# Patient Record
Sex: MALE | Race: OTHER | NOT HISPANIC OR LATINO | Employment: FULL TIME | URBAN - METROPOLITAN AREA
[De-identification: names, ages, dates, MRNs, and addresses within clinical notes are randomized per-mention and may not be internally consistent; named-entity substitution may affect disease eponyms.]

---

## 2023-05-16 DIAGNOSIS — I10 BENIGN ESSENTIAL HYPERTENSION: Primary | ICD-10-CM

## 2023-05-16 RX ORDER — PANTOPRAZOLE SODIUM 20 MG/1
TABLET, DELAYED RELEASE ORAL
COMMUNITY
Start: 2023-04-28 | End: 2023-11-28 | Stop reason: ALTCHOICE

## 2023-05-16 RX ORDER — LISINOPRIL 5 MG/1
5 TABLET ORAL DAILY
Qty: 90 TABLET | Refills: 2 | Status: SHIPPED
Start: 2023-05-16 | End: 2024-02-05

## 2023-05-16 RX ORDER — METOPROLOL SUCCINATE 100 MG/1
1 TABLET, EXTENDED RELEASE ORAL DAILY
COMMUNITY
Start: 2020-08-17 | End: 2023-07-11 | Stop reason: SINTOL

## 2023-05-16 RX ORDER — LOVASTATIN 10 MG/1
TABLET ORAL
COMMUNITY
Start: 2021-08-06 | End: 2023-07-12

## 2023-05-16 RX ORDER — SUCRALFATE 1 G/1
TABLET ORAL
COMMUNITY
Start: 2023-03-06 | End: 2023-11-28 | Stop reason: ALTCHOICE

## 2023-05-16 RX ORDER — ONDANSETRON HYDROCHLORIDE 8 MG/1
TABLET, FILM COATED ORAL
COMMUNITY
Start: 2023-03-16 | End: 2023-07-11 | Stop reason: ALTCHOICE

## 2023-05-16 RX ORDER — MINERAL OIL
1 ENEMA (ML) RECTAL DAILY
COMMUNITY
Start: 2021-02-12 | End: 2023-07-11 | Stop reason: ALTCHOICE

## 2023-05-16 RX ORDER — PREDNISONE 50 MG/1
TABLET ORAL
COMMUNITY
Start: 2023-01-01 | End: 2023-07-11 | Stop reason: ALTCHOICE

## 2023-05-16 RX ORDER — AMOXICILLIN AND CLAVULANATE POTASSIUM 875; 125 MG/1; MG/1
1 TABLET, FILM COATED ORAL 2 TIMES DAILY
Qty: 10 TABLET | Refills: 0 | COMMUNITY
Start: 2023-01-01 | End: 2023-01-06

## 2023-05-16 RX ORDER — LIDOCAINE HYDROCHLORIDE 20 MG/ML
SOLUTION ORAL; TOPICAL
COMMUNITY
Start: 2023-03-16 | End: 2023-07-11 | Stop reason: ALTCHOICE

## 2023-05-16 RX ORDER — CLOTRIMAZOLE AND BETAMETHASONE DIPROPIONATE 10; .5 MG/ML; MG/ML
LOTION TOPICAL 2 TIMES DAILY
COMMUNITY
Start: 2021-02-12 | End: 2023-07-11 | Stop reason: ALTCHOICE

## 2023-05-16 RX ORDER — ALBUTEROL SULFATE 90 UG/1
AEROSOL, METERED RESPIRATORY (INHALATION) 2 TIMES DAILY PRN
COMMUNITY
Start: 2018-10-17

## 2023-05-16 RX ORDER — PROCHLORPERAZINE MALEATE 10 MG
TABLET ORAL
COMMUNITY
Start: 2023-03-16 | End: 2023-11-28 | Stop reason: ALTCHOICE

## 2023-05-16 RX ORDER — OXYCODONE HYDROCHLORIDE 5 MG/1
CAPSULE ORAL
COMMUNITY
Start: 2023-03-23 | End: 2023-11-28 | Stop reason: ALTCHOICE

## 2023-05-16 RX ORDER — LISINOPRIL 5 MG/1
1 TABLET ORAL DAILY
COMMUNITY
End: 2023-07-11 | Stop reason: SDUPTHER

## 2023-07-08 NOTE — PROGRESS NOTES
Subjective   Patient ID: Torres Rivera is a 53 y.o. male with a history of stage IIIc NSCLC right lower lobe lung cancer, hypertension, dyslipidemia, BPH, osteoporosis, is who presents for Follow-up.    HPI The patient self stopped Metoprolol 7 months ago due to erectile dysfunction.  Stated that it had resolved after he discontinued it.  He is currently on lisinopril 5 mg and stated that his blood pressure is well controlled.  He did not take his lisinopril this morning since he is fasting for blood work.  The patient was started on prednisone 90 mg taper by oncology since he developed pneumonitis from durvalumab.  He is currently on the 50 mg and will taper until end of August.  Stated he gained a lot of weight while being on prednisone.  He is breathing much improved.  Denies shortness of breath.    The patient had blood work this morning at the oncology but stated they do not check his cholesterol level and is interested in checking his lipid panel.  He takes his lovastatin daily.  Today he denies chest pain, leg edema, heart palpitations, dizziness or headaches.  No fever or chills.  Denies nausea or vomiting.  No abdominal pain.    Review of Systems   Constitutional:  Negative for appetite change, chills, fatigue and fever.   HENT:  Negative for congestion, ear pain, facial swelling, hearing loss, nosebleeds and sore throat.    Eyes:  Negative for pain, discharge and visual disturbance.   Respiratory:  Negative for cough, choking, chest tightness, shortness of breath and wheezing.    Cardiovascular:  Negative for chest pain, palpitations and leg swelling.   Gastrointestinal:  Negative for abdominal distention, abdominal pain, anal bleeding, constipation, diarrhea, nausea and vomiting.   Endocrine: Negative for cold intolerance, heat intolerance, polydipsia, polyphagia and polyuria.   Genitourinary:  Negative for difficulty urinating, frequency, hematuria and urgency.   Musculoskeletal:  Negative for  "arthralgias, gait problem, joint swelling and myalgias.   Skin:  Negative for color change and rash.   Neurological:  Negative for dizziness, tremors, syncope, weakness, numbness and headaches.   Psychiatric/Behavioral:  Negative for behavioral problems, confusion, sleep disturbance and suicidal ideas. The patient is not nervous/anxious.        Objective   /90   Temp 36 °C (96.8 °F)   Ht 1.715 m (5' 7.5\")   Wt 98 kg (216 lb)   BMI 33.33 kg/m²     Physical Exam  Constitutional:       General: He is not in acute distress.     Appearance: Normal appearance.   HENT:      Head: Normocephalic and atraumatic.      Nose: Nose normal.   Eyes:      Extraocular Movements: Extraocular movements intact.      Conjunctiva/sclera: Conjunctivae normal.      Pupils: Pupils are equal, round, and reactive to light.   Cardiovascular:      Rate and Rhythm: Normal rate and regular rhythm.      Pulses: Normal pulses.      Heart sounds: Normal heart sounds.   Pulmonary:      Effort: Pulmonary effort is normal.      Breath sounds: Normal breath sounds.   Abdominal:      General: Bowel sounds are normal.      Palpations: Abdomen is soft.   Musculoskeletal:         General: Normal range of motion.      Cervical back: Normal range of motion and neck supple.   Neurological:      General: No focal deficit present.      Mental Status: He is alert and oriented to person, place, and time.   Psychiatric:         Mood and Affect: Mood normal.         Behavior: Behavior normal.         Thought Content: Thought content normal.         Judgment: Judgment normal.         Assessment/Plan       Stage IIIC NSCLC lung cancer  S/p bronchoscopy 1/11/2023  PET/CT scan 1/19/2023 demonstrated right lower lobe mass and right subclavicular lymph node consistent with metastatic disease  Developed pneumonitis from durvalumab  Started on prednisone 90 mg taper  Currently on prednisone 50 mg taper until end of August  Continue pantoprazole 20 mg 30 minutes " before breakfast  Follow-up with oncology every 3 weeks  CBC, CMP and TSH were done this morning    High blood pressure   Blood pressure elevated since the patient did not take his lisinopril this morning  Self stopped Metoprolol  mg due to erectile dysfunction  Continue lisinopril 5 mg daily  No added salt diet, do not add salt to your food  Try to exercise every other day for 30 minutes  Stress test done in [facility] September 2022, normal     Dyslipidemia  Continue with the low fat, low cholesterol diet  I recommend Mediterranean diet, which include fish, chicken, vegetables and olive oil  Exercise daily for 30 minutes at least 3 times a week  Continue Lovastatin 10 mg at bed time  We obtained lipid panel today     BMI 33.33 kg/m²  Elevated BMI, ideal BMI is between 23-26 kg/m2  Low fat, low cholesterol diet, low carbohydrate diet  Exercise at least 30 minutes daily     Health maintenance  Flu vaccine declined  Cologuard 3 years ago negative

## 2023-07-11 ENCOUNTER — OFFICE VISIT (OUTPATIENT)
Dept: PRIMARY CARE | Facility: CLINIC | Age: 53
End: 2023-07-11
Payer: COMMERCIAL

## 2023-07-11 VITALS
SYSTOLIC BLOOD PRESSURE: 150 MMHG | WEIGHT: 216 LBS | TEMPERATURE: 96.8 F | BODY MASS INDEX: 32.74 KG/M2 | HEIGHT: 68 IN | DIASTOLIC BLOOD PRESSURE: 90 MMHG

## 2023-07-11 DIAGNOSIS — I10 BENIGN ESSENTIAL HYPERTENSION: ICD-10-CM

## 2023-07-11 DIAGNOSIS — E55.9 MILD VITAMIN D DEFICIENCY: ICD-10-CM

## 2023-07-11 DIAGNOSIS — C34.91 NSCLC OF RIGHT LUNG (MULTI): ICD-10-CM

## 2023-07-11 DIAGNOSIS — E78.00 PURE HYPERCHOLESTEROLEMIA: Primary | ICD-10-CM

## 2023-07-11 PROBLEM — N40.0 BPH (BENIGN PROSTATIC HYPERPLASIA): Status: ACTIVE | Noted: 2023-07-11

## 2023-07-11 PROBLEM — K62.3 RECTAL PROLAPSE: Status: ACTIVE | Noted: 2023-07-11

## 2023-07-11 PROBLEM — J98.4 PNEUMONITIS: Status: ACTIVE | Noted: 2023-07-11

## 2023-07-11 PROBLEM — M81.0 OSTEOPOROSIS: Status: ACTIVE | Noted: 2023-07-11

## 2023-07-11 PROBLEM — L40.4 GUTTATE PSORIASIS: Status: ACTIVE | Noted: 2023-07-11

## 2023-07-11 PROCEDURE — 3008F BODY MASS INDEX DOCD: CPT | Performed by: PHYSICIAN ASSISTANT

## 2023-07-11 PROCEDURE — 99214 OFFICE O/P EST MOD 30 MIN: CPT | Performed by: PHYSICIAN ASSISTANT

## 2023-07-11 PROCEDURE — 82306 VITAMIN D 25 HYDROXY: CPT | Performed by: PHYSICIAN ASSISTANT

## 2023-07-11 PROCEDURE — 3080F DIAST BP >= 90 MM HG: CPT | Performed by: PHYSICIAN ASSISTANT

## 2023-07-11 PROCEDURE — 80061 LIPID PANEL: CPT | Performed by: PHYSICIAN ASSISTANT

## 2023-07-11 PROCEDURE — 1036F TOBACCO NON-USER: CPT | Performed by: PHYSICIAN ASSISTANT

## 2023-07-11 PROCEDURE — 3077F SYST BP >= 140 MM HG: CPT | Performed by: PHYSICIAN ASSISTANT

## 2023-07-11 RX ORDER — SULFAMETHOXAZOLE AND TRIMETHOPRIM 800; 160 MG/1; MG/1
TABLET ORAL
COMMUNITY
Start: 2023-06-24 | End: 2023-11-28 | Stop reason: ALTCHOICE

## 2023-07-11 RX ORDER — PREDNISONE 10 MG/1
TABLET ORAL
COMMUNITY
Start: 2023-06-26 | End: 2023-11-28 | Stop reason: ALTCHOICE

## 2023-07-11 ASSESSMENT — COLUMBIA-SUICIDE SEVERITY RATING SCALE - C-SSRS
1. IN THE PAST MONTH, HAVE YOU WISHED YOU WERE DEAD OR WISHED YOU COULD GO TO SLEEP AND NOT WAKE UP?: NO
2. HAVE YOU ACTUALLY HAD ANY THOUGHTS OF KILLING YOURSELF?: NO
6. HAVE YOU EVER DONE ANYTHING, STARTED TO DO ANYTHING, OR PREPARED TO DO ANYTHING TO END YOUR LIFE?: NO

## 2023-07-11 ASSESSMENT — ENCOUNTER SYMPTOMS
WHEEZING: 0
ABDOMINAL PAIN: 0
TREMORS: 0
NUMBNESS: 0
POLYPHAGIA: 0
DEPRESSION: 0
EYE PAIN: 0
DIFFICULTY URINATING: 0
CHEST TIGHTNESS: 0
NAUSEA: 0
LOSS OF SENSATION IN FEET: 0
APPETITE CHANGE: 0
FREQUENCY: 0
NERVOUS/ANXIOUS: 0
JOINT SWELLING: 0
PALPITATIONS: 0
POLYDIPSIA: 0
VOMITING: 0
FEVER: 0
MYALGIAS: 0
COUGH: 0
ARTHRALGIAS: 0
WEAKNESS: 0
SORE THROAT: 0
CHILLS: 0
SHORTNESS OF BREATH: 0
ABDOMINAL DISTENTION: 0
OCCASIONAL FEELINGS OF UNSTEADINESS: 0
DIZZINESS: 0
ANAL BLEEDING: 0
FATIGUE: 0
COLOR CHANGE: 0
SLEEP DISTURBANCE: 0
EYE DISCHARGE: 0
HEADACHES: 0
FACIAL SWELLING: 0
DIARRHEA: 0
CONFUSION: 0
HEMATURIA: 0
CHOKING: 0
CONSTIPATION: 0

## 2023-07-11 ASSESSMENT — PATIENT HEALTH QUESTIONNAIRE - PHQ9
2. FEELING DOWN, DEPRESSED OR HOPELESS: NOT AT ALL
1. LITTLE INTEREST OR PLEASURE IN DOING THINGS: NOT AT ALL
SUM OF ALL RESPONSES TO PHQ9 QUESTIONS 1 AND 2: 0

## 2023-07-12 DIAGNOSIS — E78.00 PURE HYPERCHOLESTEROLEMIA: Primary | ICD-10-CM

## 2023-07-12 RX ORDER — ROSUVASTATIN CALCIUM 10 MG/1
10 TABLET, COATED ORAL DAILY
Qty: 30 TABLET | Refills: 5 | Status: SHIPPED | OUTPATIENT
Start: 2023-07-12 | End: 2023-12-12 | Stop reason: SDUPTHER

## 2023-09-22 ENCOUNTER — HOSPITAL ENCOUNTER (OUTPATIENT)
Dept: DATA CONVERSION | Facility: HOSPITAL | Age: 53
End: 2023-09-22
Attending: STUDENT IN AN ORGANIZED HEALTH CARE EDUCATION/TRAINING PROGRAM | Admitting: STUDENT IN AN ORGANIZED HEALTH CARE EDUCATION/TRAINING PROGRAM
Payer: COMMERCIAL

## 2023-09-22 DIAGNOSIS — C34.32 MALIGNANT NEOPLASM OF LOWER LOBE, LEFT BRONCHUS OR LUNG (MULTI): ICD-10-CM

## 2023-09-22 DIAGNOSIS — R91.1 SOLITARY PULMONARY NODULE: ICD-10-CM

## 2023-09-22 DIAGNOSIS — C34.31 MALIGNANT NEOPLASM OF LOWER LOBE, RIGHT BRONCHUS OR LUNG (MULTI): ICD-10-CM

## 2023-09-27 PROBLEM — R91.8 LUNG MASS: Status: ACTIVE | Noted: 2023-09-27

## 2023-09-27 PROBLEM — J41.0 SMOKERS' COUGH (MULTI): Status: ACTIVE | Noted: 2023-09-27

## 2023-09-27 PROBLEM — R59.0 MEDIASTINAL LYMPHADENOPATHY: Status: ACTIVE | Noted: 2023-09-27

## 2023-09-27 PROBLEM — J45.991 COUGH VARIANT ASTHMA (HHS-HCC): Status: ACTIVE | Noted: 2023-09-27

## 2023-09-27 PROBLEM — C34.31: Status: ACTIVE | Noted: 2023-09-27

## 2023-09-27 PROBLEM — R91.1 NODULE OF RIGHT LUNG: Status: ACTIVE | Noted: 2023-09-27

## 2023-09-27 PROBLEM — F17.200 NICOTINE DEPENDENCE: Status: ACTIVE | Noted: 2023-09-27

## 2023-09-27 PROBLEM — H60.541 ECZEMA OF RIGHT EXTERNAL EAR: Status: ACTIVE | Noted: 2023-09-27

## 2023-09-27 PROBLEM — R63.5 ABNORMAL WEIGHT GAIN: Status: ACTIVE | Noted: 2023-09-27

## 2023-09-27 PROBLEM — E86.0 DEHYDRATION: Status: ACTIVE | Noted: 2023-09-27

## 2023-09-27 PROBLEM — R06.02 SHORTNESS OF BREATH ON EXERTION: Status: ACTIVE | Noted: 2023-09-27

## 2023-09-27 LAB
COMPLETE PATHOLOGY REPORT: NORMAL
CONVERTED ADDENDUM DIAGNOSIS 2: NORMAL
CONVERTED ADDENDUM DIAGNOSIS 3: NORMAL
CONVERTED ADDENDUM DIAGNOSIS 4: NORMAL
CONVERTED CLINICAL DIAGNOSIS-HISTORY: NORMAL
CONVERTED FINAL DIAGNOSIS: NORMAL
CONVERTED FINAL REPORT PDF LINK TO COPY AND PASTE: NORMAL
CONVERTED GROSS DESCRIPTION: NORMAL

## 2023-10-03 ENCOUNTER — TELEMEDICINE (OUTPATIENT)
Dept: HEMATOLOGY/ONCOLOGY | Facility: HOSPITAL | Age: 53
End: 2023-10-03
Payer: COMMERCIAL

## 2023-10-03 VITALS — HEIGHT: 68 IN | BODY MASS INDEX: 32.88 KG/M2 | WEIGHT: 216.93 LBS

## 2023-10-03 DIAGNOSIS — C34.31 PRIMARY SQUAMOUS CELL CARCINOMA OF LOWER LOBE OF RIGHT LUNG (MULTI): Primary | ICD-10-CM

## 2023-10-03 PROBLEM — C34.91 NSCLC OF RIGHT LUNG (MULTI): Status: RESOLVED | Noted: 2023-07-11 | Resolved: 2023-10-03

## 2023-10-03 PROCEDURE — 99214 OFFICE O/P EST MOD 30 MIN: CPT | Performed by: STUDENT IN AN ORGANIZED HEALTH CARE EDUCATION/TRAINING PROGRAM

## 2023-10-03 RX ORDER — PROCHLORPERAZINE MALEATE 10 MG
10 TABLET ORAL EVERY 6 HOURS PRN
Qty: 30 TABLET | Refills: 5 | Status: SHIPPED
Start: 2023-10-03 | End: 2023-12-12 | Stop reason: ALTCHOICE

## 2023-10-03 NOTE — PROGRESS NOTES
[facility] - Medical Oncology Follow-Up Visit    Patient ID: Torres Rivera is a 53 y.o. male with NSCLC squamous cell ca     Current therapy: nivolumab (Opdivo) 360 mg in sodium chloride 0.9% 100 mL IV, 360 mg, intravenous, Once, 0 of 9 cycles        ipilimumab (Yervoy) 100 mg in sodium chloride 0.9% 50 mL IV, 1 mg/kg, intravenous, Once, 0 of 9 cycles       Chief Concern: results of biopsy    Oncologic History:     DIAGNOSIS  NSCLC squamous cell ca     STAGING  T3N3M0, now with M1a progression      CURRENT SITES OF DISEASE  RLL, R hilar, subcarinal, supraclavicular, LLL     MOLECULAR GENOMICS  RLL:  PD-L1 <1%  PIK3CA amplification  TP53 splice site ([id number] c.673-1G>T)     LLL:  PD-L1 10%  PIK3CA amplification  TP53 splice site ([id number] c.673-1G>T)      PRIOR THERAPY  concurrent chemoradiation weekly carbo/taxol 2/21/23 - 4/3/23  durvalumab 4/25/23 - 5/9/23 (2 doses)     CURRENT THERAPY       CURRENT ONCOLOGICAL PROBLEMS           HISTORY OF PRESENT ILLNESS  Torres Rivera is a 53 yo M PMHx tobacco use disorder presenting for new visit for suspected lung cancer.      Pt presented to [facility] on 12/31/22 for acute chest pain. CT PE showed a mass like opacity in the RLL 5.9x4.3x2.5cm with subcarinal and R hilar adenopathy and postobstructive pneumonia. Mild interlobular septal thickening at the R lung base  was thought possibly due to lymphangitic carcinomatosis. He was prescribed antibiotics and prednisone. His chest pain resolved after a few days. He otherwise reports he had felt in good health. Denied new SOB, cough, weight loss, fatigue, change in appetite,  fever or chills. He has stable chronic SOB from smoking. He underwent bronchoscopy with biopsy on 1/11/23 at  with pathology revealing squamous cell ca of station 7, NGS without targetable mutation,  insufficient tissue for PD-L1. PET/CT revealed FDG-avidity of R supclavicular node as well. Biopsy of the  supraclavicular node positive for squamous cell ca. Offered concurrent chemoradiation with weekly carbo/taxol which he started on 2/21/23 and ended  4/3/23. He started durvalumab 4/25/23. Course c/b pneumonitis, and durvalumab held after 2 doses (5/2023). Treated with steroids with improvement. Restaging scans unfortunately with concern for new LLL nodule, and PET/CT 8/2023 with FDG avidity of new  lesion. Biopsy done 9/22/23, with pathology consistent with squamous cell ca, same PIK3CA and TP53 mutations. He is not interested in any further radiation and strongly prefers immunotherapy. Discussed risk of recurrent pneumonitis, which he understands. Plan to move forward with ipi/nivo to start 10/17/23.         PAST MEDICAL HISTORY  HTN  Tobacco use disorder   BPH  HLD  Osteoporosis   Psoriasis         SOCIAL HISTORY  Home: lives with his wife   Work:   Tobacco: quit 1/2023. 1PPD x 30 yrs   Alcohol: ~4 drinks/week  Drugs: none        CURRENT MEDS  Lisinopril 5  Lovastatin 10  Metoprolol succinate 100mg   Albuterol PRN         ALLERGIES  NKDA     FAMILY HISTORY   No family history of lung cancer        HPI   Phone visit today due to technical difficulties with telemedicine virtual software. He continues to feel well without any complaints.      Meds (Current):    Current Outpatient Medications:     lisinopril 5 mg tablet, Take 1 tablet (5 mg) by mouth once daily., Disp: 90 tablet, Rfl: 2    oxyCODONE (Oxy-IR) 5 mg immediate release capsule, 1 CAP(S) ORALLY 4 TIMES A DAY, AS NEEDED, Disp: , Rfl:     pantoprazole (ProtoNix) 20 mg EC tablet, , Disp: , Rfl:     predniSONE (Deltasone) 10 mg tablet, , Disp: , Rfl:     prochlorperazine (Compazine) 10 mg tablet, , Disp: , Rfl:     rosuvastatin (Crestor) 10 mg tablet, Take 1 tablet (10 mg) by mouth once daily., Disp: 30 tablet, Rfl: 5    sucralfate (Carafate) 1 gram tablet, , Disp: , Rfl:     sulfamethoxazole-trimethoprim (Bactrim DS) 800-160 mg tablet, , Disp: ,  Rfl:     Ventolin HFA 90 mcg/actuation inhaler, Inhale., Disp: , Rfl:     Review of Systems   All other systems reviewed and are negative.       Objective   BSA: There is no height or weight on file to calculate BSA.  There were no vitals taken for this visit.  Performance Status:  Asymptomatic     Physical Exam     Results:  Labs:  Lab Results   Component Value Date    WBC 5.5 08/22/2023    HGB 14.5 08/22/2023    HCT 42.7 08/22/2023    MCV 86 08/22/2023     08/22/2023      Lab Results   Component Value Date    NEUTROABS 3.49 08/22/2023      Lab Results   Component Value Date    GLUCOSE 91 08/22/2023    CALCIUM 9.0 08/22/2023     08/22/2023    K 4.0 08/22/2023    CO2 23 08/22/2023     (H) 08/22/2023    BUN 16 08/22/2023    CREATININE 0.68 08/22/2023     Lab Results   Component Value Date    ALT 40 08/22/2023    AST 22 08/22/2023    ALKPHOS 76 08/22/2023    BILITOT 0.7 08/22/2023      Lab Results   Component Value Date    TSH 0.47 07/11/2023       Imaging:  I have personally reviewed the below imaging and concur with the reported findings unless otherwise stated:    CT guided percutaneous biopsy lung    Result Date: 9/23/2023  Impression: 1. Technically successful CT-guided left lower lung lobe solid pulmonary nodule biopsy. 2. No evidence of pneumothorax on immediate postprocedure CT as well as baseline chest radiograph and post 3 hour radiograph. The patient was discharged home.   I was present for and/or performed the critical portions of the procedure and immediately available throughout the entire procedure.  I personally reviewed the image(s)/study and interpretation. I agree with the findings as stated.  Performed and dictated at [facility].    MR brain w and wo IV contrast    Result Date: 9/21/2023  Impression: No evidence of intracranial metastatic disease was identified at this time.  MACRO: None      === Results for orders placed in visit on 09/21/23  ===    MR brain w and wo IV contrast [[id number]] 09/21/2023    Status: Normal  No evidence of intracranial metastatic disease was identified at this  time.    MACRO:  None    Lab Results   Component Value Date    PATHREP  09/22/2023     Name CONSTANCE CHAN                                                                                                   Accession #: [id number]            Pathologist:                   Nilda Albarran MD, Ph.D.  Date of Procedure:    9/22/2023  Date Received:          9/22/2023  Date Reported           9/27/2023  Submitting Physician:   SIVA LEE M.D.  Location:                    0RAD   Copy To/Referring/Attending:  GAEL PÉREZ Other External #     Procedures/Addenda Present                                                               FINAL DIAGNOSIS  LEFT LUNG NODULE, BIOPSY:    --POORLY DIFFERENTIATED NON-SMALL CELL CARCINOMA, CONSISTENT WITH SQUAMOUS CELL  CARCINOMA IN A BACKGROUND OF EXTENSIVE NECROSIS.  SEE NOTE    Note:  Neoplastic cells are immunoreactive with p40.  Additional studies including  PD-L1 immunohistochemistry and NGS analysis are pending.  Addenda will follow.                                                                                                                                                                                                                                                                                                                                                                                                                                                                                       Electronically Signed Out By Nilda Albarran MD, Ph.D./MAC  By the signature on this report, the individual or group listed as making the  Final Interpretation/Diagnosis certifies that they have reviewed this case.  Diagnostic interpretation performed at [facility] [address]  [place]. [facility] [address]            Addendum/Procedures:  Special Oncology Report     Date Ordered:     9/27/2023     Status:  Signed Out       Date Complete:     9/27/2023             Date Reported:     9/27/2023                  Addendum Diagnosis  PD-L1 22C3  by Immunohistochemistry with Interpretation, pembrolizumab  (KEYTRUDA)    Block used:  A1    Interpretation: LOW EXPRESSION    Tumor Proportion Score (TPS): 10%      Intended use:  PD-L1 22C3 by IHC with Interpretation is a qualitative immunohistochemical  assay using Monoclonal Mouse Anti-PD-L1, Clone 22C3 intended for use in the  detection of PD-L1 protein in formalin-fixed, paraffin-embedded (FFPE)  non-small cell lung cancer (NSCLC) tissue using the Optiview detection on a  [place] BenchMark Ultra. The specimen submitted for testing should contain at  least 100 viable tumor cells to be considered adequate for evaluation. This  assay is indicated as an aid in identifying NSCLC patients for treatment with  pembrolizumab (KEYTRUDA).    Methodology:  PD-L1 protein expression is determined by using Tumor Proportion Score (TPS),  which is the percentage of viable tumor cells showing partial or complete  membrane staining at any intensity. In the clinical setting of first-line  therapy (treatment-naïve patients), the specimen is considered PD-L1 positive  if the TPS is equal to or greater than 50 percent. In the setting of  second-line therapy, the specimen is considered positive if the TPS is equal to  or greater than 1 percent.    Reference Range:  High Expression >=50% TPS  Low Expression 1-49% TPS  No Expression <1% TPS    This assay is validated and FDA-approved for lung cancer specimens only. For  all other specimen types, results should be interpreted with caution and within  the appropriate clinical context. The use of this assay on decalcified tissues  has not been validated and is not recommended.   One or more of the reagents used to perform assays on this specimen MAY  have  contained components considered to be Laboratory Developed Tests (LDT).  LDT's  have not been cleared or approved by the U.S. Food and Drug Administration.   These assays/tests were developed and their performance characteristics  determined by the Department of Pathology Immunohistochemistry Lab at  [facility]. The FDA does not require this  test to go through premarket FDA review. This test is used for clinical  purposes. It should not be regarded as investigational or for research. This  laboratory is certified under the Clinical Laboratory Improvement Amendments  (CLIA) as qualified to perform high complexity clinical laboratory testing.   The assays/tests were performed with appropriate positive and negative controls  which stained appropriately.           Electronically Signed Out By Nilda Albarran MD, Ph.D./MAC  By the signature on this report, the individual or group listed as making the  Final Interpretation/Diagnosis certifies that they have reviewed this case.  Addendum signed out at [facility] [address] [facility]  [address].           Special Oncology Report     Date Ordered:     9/29/2023     Status:  Signed Out       Date Complete:     9/29/2023             Date Reported:     9/29/2023                  Addendum Diagnosis  TEST: Focused Solid Tumor DNA Panel  SPECIMEN: FFPE, LEFT LUNG NODULE, [id number]  DISEASE DIAGNOSIS: Squamous Cell Carcinoma  Estimated Tumor Content: 20%  COLLECTION DATE: 9/22/2023  RECEIVED DATE: 9/27/2023  REPORT DATE: 09/29/2023    MICROSATELLITE STATUS: Microsatellite Instability-High (MSI-H) is NOT DETECTED.      DISEASE ASSOCIATED GENOMIC FINDINGS:  PIK3CA amplification  TP53 splice site ([id number] c.673-1G>T)      Note: The genomic findings in the current specimen 'LEFT LUNG NODULE' collected  on 09/22/20023, are similar to that seen in the previous specimen, '[id number],  LYMPH NODE-7' collected on 11/01/2023. If  consistent with other  clinicopathological findings, the similarity in genomic findings supports that  two lesions are from the same origin.       DISEASE RELEVANT ALTERATIONS NOT DETECTED:  Negative for BRAF V600E.  Negative for EGFR sensitizing mutation.  Negative for ERBB2 activating mutation  Negative for KRAS G12C.      Archival material from this surgical specimen has been reviewed by the  pathologist in order to determine the most appropriate tumor tissue for further  molecular testing. The identification and selection of this tumor tissue is  critical to the success of subsequent molecular testing and analysis.    DISCLAIMER:  This assay is designed to detect targeted clinically-relevant single nucleotide  variants, insertion and deletions (<30bp), and whole gene high copy number  amplifications in a select group of genes.  This assay does not distinguish  between somatic and germline alterations in analyzed regions.  A negative  result (mutation not identified) does not rule out the presence of a mutation  below the limit of detection of this assay due to low neoplastic cell content,  tumor heterogeneity, or the presence of additional mutations in the listed  genes which are outside of the target regions in this assay.  General  population polymorphisms, promoter, synonymous and intronic variants (with the  exception of splice variants) are not generally included in this report.  The  MSI-H/SAPPHIRE designation is based on analysis of up to 10 mononucleotide repeat  loci and not based on the 5 or 7 MSI described in current clinical practice  guidelines.  The threshold for MSI-H/SAPPHIRE was determined by analytical  concordance to dMMR IHC assays using mainly colorectal and uterine FFPE tissue.   The clinical validity of the qualitative MSI designation has not been  established.    Identification or absence of cancer-associated mutations does not necessarily  indicate a response to therapy.  Decisions on patient care  and treatment must  be based on the independent medical judgment of the treating physician, taking  into account all applicable information concerning the patients condition such  as clinical and histopathologic findings, other laboratory findings, and  patient preferences.  This report includes information from public sources,  including scientific and medical literature to better characterize the  significance of alterations detected.    Nucleic acid extract...    ADD2  02/03/2023     PD-L1 22C3  by Immunohistochemistry with Interpretation, pembrolizumab  (KEYTRUDA)    Block used:  A1    Interpretation: Low expression    Tumor Proportion Score (TPS): 1%      Intended use:  PD-L1 22C3 by IHC with Interpretation is a qualitative immunohistochemical  assay using Monoclonal Mouse Anti-PD-L1, Clone 22C3 intended for use in the  detection of PD-L1 protein in formalin-fixed, paraffin-embedded (FFPE)  non-small cell lung cancer (NSCLC) tissue using the Optiview detection on a  [employer] BenchMark Ultra. The specimen submitted for testing should contain at  least 100 viable tumor cells to be considered adequate for evaluation. This  assay is indicated as an aid in identifying NSCLC patients for treatment with  pembrolizumab (KEYTRUDA).    Methodology:  PD-L1 protein expression is determined by using Tumor Proportion Score (TPS),  which is the percentage of viable tumor cells showing partial or complete  membrane staining at any intensity. In the clinical setting of first-line  therapy (treatment-naï¿½ve patients), the specimen is considered PD-L1 positive  if the TPS is equal to or greater than 50 percent. In the setting of  second-line therapy, the specimen is considered positive if the TPS is equal to  or greater than 1 percent.    Reference Range:  High Expression >=50% TPS  Low Expression 1-49% TPS  No Expression <1% TPS    This assay is validated and FDA-approved for lung cancer specimens only. For  all other specimen  types, results should be interpreted with caution and within  the appropriate clinical context. The use of this assay on decalcified tissues  has not been validated and is not recommended.   One or more of the reagents used to perform assays on this specimen MAY have  contained components considered to be Laboratory Developed Tests (LDT).  LDT's  have not been cleared or approved by the U.S. Food and Drug Administration.   These assays/tests were developed and their performance characteristics  determined by the Department of Pathology Immunohistochemistry Lab at  [facility]. The FDA does not require this  test to go through premarket FDA review. This test is used for clinical  purposes. It should not be regarded as investigational or for research. This  laboratory is certified under the Clinical Laboratory Improvement Amendments  (CLIA) as qualified to perform high complexity clinical laboratory testing.   The assays/tests were performed with appropriate positive and negative controls  which stained appropriately.        ADD3  01/11/2023     TEST: Focused Solid Tumor DNA/RNA Panel  SPECIMEN: Cytology, LYMPH NODE-7, [id number]  DISEASE DIAGNOSIS: Squamous Cell Carcinoma  Estimated Tumor Content: 80%  COLLECTION DATE: 1/11/2023  RECEIVED DATE: 1/23/2023  REPORT DATE: 01/27/2023    MICROSATELLITE STATUS: Microsatellite Instability-High (MSI-H) is NOT DETECTED.      DISEASE ASSOCIATED GENOMIC FINDINGS:  PIK3CA amplification  TP53 splice site ([id number] c.673-1G>T)    DISEASE RELEVANT ALTERATIONS NOT DETECTED:  Negative for ALK fusion.  Negative for BRAF V600E.  Negative for EGFR sensitizing mutation.  Negative for ERBB2 activating mutation  Negative for KRAS G12C.  Negative for MET exon 14 skipping mutation.  Negative for NTRK fusion.  Negative for RET fusion.  Negative for ROS1 fusion.      DISCLAIMER:  This assay is designed to detect targeted clinically-relevant single  nucleotide  variants, insertion and deletions (<30bp), whole gene high copy number  amplifications, and translocations in a select group of genes.  This assay does  not distinguish between somatic and germline alterations in analyzed regions.   A negative result (mutation not identified) does not rule out the presence of a  mutation below the limit of detection of this assay due to low neoplastic cell  content, tumor heterogeneity, or the presence of additional mutations in the  listed genes which are outside of the target regions in this assay.  General  population polymorphisms, promoter, synonymous and intronic variants (with the  exception of splice variants) are not generally included in this report.  The  MSI-H/SAPPHIRE designation is based on analysis of up to 10 mononucleotide repeat  loci and not based on the 5 or 7 MSI described in current clinical practice  guidelines.  The threshold for MSI-H/SAPPHIRE was determined by analytical  concordance to dMMR IHC assays using mainly colorectal and uterine FFPE tissue.   The clinical validity of the qualitative MSI designation has not been  established.    Identification or absence of cancer-associated mutations does not necessarily  indicate a response to therapy.  Decisions on patient care and treatment must  be based on the independent medical judgment of the treating physician, taking  into account all applicable information concerning the patient's condition such  as clinical and histopathologic findings, other laboratory findings, and  patient preferences.  This report includes information from public sources,  including scientific and medical literature to better characterize the  significance of alterations detected.    Nucleic acid extraction and testing are performed in the [facility] ([facility]) located at [address] [place] [address] (CLIA License #[id number], CAP #[id number]).    This laboratory developed test was  developed and its analytical performance  characteristics have been determined by [facility].  This test  has not been cleared or approved by the FDA; however, the FDA has determined  that such approval is not necessary. The [facility] is certified under the Clinical  Laboratory Improvement Amendments of 1988 (CLIA-88) as qualified to perform  high complexity testing.    PANEL GENE LIST:  Hotspot Mutations: AKT1, ALK, APC, AR, ARAF, B2M, BRAF, CCNND1, CDK4, CDKN2A,  CREBBP, CTNNB1, DDR2, EGFR, , ERBB2, ERBB3, ERBB4, ESR1, FBXW7, FGFR2,  FGFR3, GNA11, GNAQ, HRAS, IDH1, IDH2, JAK1, JAK2, JAK3, KEAP1, KIT, KRAS,  MAP2K1, MAP2K2, MET, MTOR, NFE2L2, NRAS, PDGFRA, PIK3CA, POLE, PTEN, RAF1, RET,  ROS1, SMAD4, SMO, TP53, U2AF1.  Copy Number Variants: ALK, AR, DAVID, BRAF, CCND1, CDK4, CDK6, EGFR, ERBB2,  FGFR1, FGFR2, FGFR3, FGFR4, KIT, KRAS, MET, MYC, MYCN, PDGFRA, PIK3CA, RICTOR.  Fusion Drivers: ABL1, ALK, AKT3, DAVID, BRAF, EGFR, ERBB2, ERG, ETV1, ETV4, ETV5,  FGFR1, FGFR2, FGFR3, MET, NTRK1, NTRK2, NTRK3, PDGFRA, PPARG, RAF1, RET, ROS1.    Not all exons of all genes are sequenced. Genome assembly (hg19) was used for  alignment and variant calling.       ]      Assessment/Plan      Torres Rivera is a 53 y.o. male with PMHx HTN, psoriasis, and tobacco use who presents with NSCLC squamous cell ca, no targetable mutations, neg PD-L1 who is started concurrent  chemoradiation with weekly carbo/taxol.  Completed concurrent chemo/RT, last radiation 4/3/23, last dose of Carbo/Taxol 3/21/23. Started durvalumab 4/25/23, c/b G2 pneumonitis after 2 doses durva. Now with concern for progression.    #NSCLC squamous cell ca  - T3N3M0, Stage IIIC  - PD-L1 neg, no targetable mutations  - supraclavicular node biopsy-proven squamous cell ca  - met Dr. Lew and discussed concurrent chemoradiation  - he has concerns about radiation and treatment in general   - discussed if we move forward with curative-intent concurrent  chemoradiation, would receive weekly carbo/taxol. discussed palliative treatment with combination chemo-immunotherapy would be the non-radiation option, unfortunately surgery is not a viable  option.  - discussed potential side effects of carbo/taxol including but not limited to allergic reaction, nausea/vomiting, diarrhea/constipation, fatigue, injury to liver/kidney, risk of infection, anemia/thrombocytopenia. consented 2/7/23  - C1D1 concurrent chemoRT with weekly carbo/taxol 2/21/23  - discussed a year of durvalumab afterwards broadly  - C2D1 3/7/23  - 3/28/23 Carbo/Taxol held for thrombocytopenia (66), given IVF in infusion for decreased fluid intake/tachycardia  -last radiation 4/3/23; last Carbo/Taxol 3/21/23  - consented for 1 year durvalumab 4/25/23 and received two doses  - CT scan concerning for pneumonitis in view of worsening SOB, prednisone initiated at 1mg/kg/day (90mg) as well as PPI/Bactrim prophylactically, completed steroid taper with resolution of symptoms.   - personally reviewed PET/CT with FDG-avidity  of LLL lesion and possible abdominal lymph node; will obtain biopsy of LLL lesion - done 9/22/23 and results pending  - personally reviewed brain MRI without evidence of disease  - discussed results of biopsy of LLL nodule, with same histology and biomarkers. PD-L1 10%. Discussed likelihood that this is M1a progression. He remains uninterested in any further radiation and strongly would like to pursue immunotherapy. Discussed potential of recurrent pneumonitis. Discussed clinical trial option, which he is currently also not interested in.  - will send full NGS tempus on tissue  - discussed ipi/nivo broadly, with plans to start and consent on 10/17/23     # G2 pneumonitis-irAE - resolved  -prednisone initiated at 1mg/kg (90mg) and completed steroid taper     #Anxiety  -related to new suspected diagnosis of lung cancer  -Appreciate  support     #Psoriasis  -Reports previously diagnosed by  dermatology and was previously on Otezla when rash was more diffuse  -Currently on topical steroid, reports minimal benefit. Small rash present on the elbow  - offered dermatology referral, and he deferred     Belia Blevins MD  [facility]

## 2023-10-04 DIAGNOSIS — C34.31 PRIMARY SQUAMOUS CELL CARCINOMA OF LOWER LOBE OF RIGHT LUNG (MULTI): Primary | ICD-10-CM

## 2023-10-04 RX ORDER — METHYLPREDNISOLONE SODIUM SUCCINATE 40 MG/ML
40 INJECTION INTRAMUSCULAR; INTRAVENOUS AS NEEDED
Status: CANCELLED | OUTPATIENT
Start: 2023-10-17

## 2023-10-04 RX ORDER — EPINEPHRINE 0.3 MG/.3ML
0.3 INJECTION SUBCUTANEOUS EVERY 5 MIN PRN
Status: CANCELLED | OUTPATIENT
Start: 2023-11-07

## 2023-10-04 RX ORDER — PROCHLORPERAZINE EDISYLATE 5 MG/ML
10 INJECTION INTRAMUSCULAR; INTRAVENOUS EVERY 6 HOURS PRN
Status: CANCELLED | OUTPATIENT
Start: 2023-10-17

## 2023-10-04 RX ORDER — METHYLPREDNISOLONE SODIUM SUCCINATE 40 MG/ML
40 INJECTION INTRAMUSCULAR; INTRAVENOUS AS NEEDED
Status: CANCELLED | OUTPATIENT
Start: 2023-11-07

## 2023-10-04 RX ORDER — ALBUTEROL SULFATE 0.83 MG/ML
3 SOLUTION RESPIRATORY (INHALATION) AS NEEDED
Status: CANCELLED | OUTPATIENT
Start: 2023-11-07

## 2023-10-04 RX ORDER — ALBUTEROL SULFATE 0.83 MG/ML
3 SOLUTION RESPIRATORY (INHALATION) AS NEEDED
Status: CANCELLED | OUTPATIENT
Start: 2023-10-17

## 2023-10-04 RX ORDER — FAMOTIDINE 10 MG/ML
20 INJECTION INTRAVENOUS ONCE AS NEEDED
Status: CANCELLED | OUTPATIENT
Start: 2023-11-07

## 2023-10-04 RX ORDER — PROCHLORPERAZINE EDISYLATE 5 MG/ML
10 INJECTION INTRAMUSCULAR; INTRAVENOUS EVERY 6 HOURS PRN
Status: CANCELLED | OUTPATIENT
Start: 2023-11-07

## 2023-10-04 RX ORDER — FAMOTIDINE 10 MG/ML
20 INJECTION INTRAVENOUS ONCE AS NEEDED
Status: CANCELLED | OUTPATIENT
Start: 2023-10-17

## 2023-10-04 RX ORDER — DIPHENHYDRAMINE HYDROCHLORIDE 50 MG/ML
50 INJECTION INTRAMUSCULAR; INTRAVENOUS AS NEEDED
Status: CANCELLED | OUTPATIENT
Start: 2023-10-17

## 2023-10-04 RX ORDER — EPINEPHRINE 0.3 MG/.3ML
0.3 INJECTION SUBCUTANEOUS EVERY 5 MIN PRN
Status: CANCELLED | OUTPATIENT
Start: 2023-10-17

## 2023-10-04 RX ORDER — DIPHENHYDRAMINE HYDROCHLORIDE 50 MG/ML
50 INJECTION INTRAMUSCULAR; INTRAVENOUS AS NEEDED
Status: CANCELLED | OUTPATIENT
Start: 2023-11-07

## 2023-10-04 RX ORDER — PROCHLORPERAZINE MALEATE 5 MG
10 TABLET ORAL EVERY 6 HOURS PRN
Status: CANCELLED | OUTPATIENT
Start: 2023-11-07

## 2023-10-04 RX ORDER — PROCHLORPERAZINE MALEATE 5 MG
10 TABLET ORAL EVERY 6 HOURS PRN
Status: CANCELLED | OUTPATIENT
Start: 2023-10-17

## 2023-10-10 ENCOUNTER — TELEPHONE (OUTPATIENT)
Dept: ADMISSION | Facility: HOSPITAL | Age: 53
End: 2023-10-10
Payer: COMMERCIAL

## 2023-10-10 NOTE — TELEPHONE ENCOUNTER
Appointment requests are noted in the EMR need to be completed. Patient was transferred to scheduling line

## 2023-10-17 ENCOUNTER — INFUSION (OUTPATIENT)
Dept: HEMATOLOGY/ONCOLOGY | Facility: HOSPITAL | Age: 53
End: 2023-10-17
Payer: COMMERCIAL

## 2023-10-17 ENCOUNTER — OFFICE VISIT (OUTPATIENT)
Dept: HEMATOLOGY/ONCOLOGY | Facility: HOSPITAL | Age: 53
End: 2023-10-17
Payer: COMMERCIAL

## 2023-10-17 ENCOUNTER — LAB (OUTPATIENT)
Dept: LAB | Facility: HOSPITAL | Age: 53
End: 2023-10-17
Payer: COMMERCIAL

## 2023-10-17 VITALS
RESPIRATION RATE: 18 BRPM | BODY MASS INDEX: 33.6 KG/M2 | HEART RATE: 96 BPM | TEMPERATURE: 96.1 F | SYSTOLIC BLOOD PRESSURE: 138 MMHG | WEIGHT: 220.9 LBS | OXYGEN SATURATION: 97 % | DIASTOLIC BLOOD PRESSURE: 99 MMHG

## 2023-10-17 VITALS
SYSTOLIC BLOOD PRESSURE: 137 MMHG | WEIGHT: 220.24 LBS | TEMPERATURE: 97.2 F | RESPIRATION RATE: 18 BRPM | DIASTOLIC BLOOD PRESSURE: 93 MMHG | BODY MASS INDEX: 33.5 KG/M2 | OXYGEN SATURATION: 97 % | HEART RATE: 100 BPM

## 2023-10-17 DIAGNOSIS — C34.31 PRIMARY SQUAMOUS CELL CARCINOMA OF LOWER LOBE OF RIGHT LUNG (MULTI): ICD-10-CM

## 2023-10-17 LAB
ALBUMIN SERPL BCP-MCNC: 4.2 G/DL (ref 3.4–5)
ALP SERPL-CCNC: 87 U/L (ref 33–120)
ALT SERPL W P-5'-P-CCNC: 32 U/L (ref 10–52)
AMYLASE SERPL-CCNC: 49 U/L (ref 29–103)
ANION GAP SERPL CALC-SCNC: 14 MMOL/L (ref 10–20)
AST SERPL W P-5'-P-CCNC: 23 U/L (ref 9–39)
BASOPHILS # BLD AUTO: 0.05 X10*3/UL (ref 0–0.1)
BASOPHILS NFR BLD AUTO: 0.9 %
BILIRUB SERPL-MCNC: 0.6 MG/DL (ref 0–1.2)
BUN SERPL-MCNC: 15 MG/DL (ref 6–23)
CALCIUM SERPL-MCNC: 9.8 MG/DL (ref 8.6–10.3)
CHLORIDE SERPL-SCNC: 106 MMOL/L (ref 98–107)
CO2 SERPL-SCNC: 26 MMOL/L (ref 21–32)
CORTIS AM PEAK SERPL-MSCNC: 6.1 UG/DL (ref 5–20)
CREAT SERPL-MCNC: 0.81 MG/DL (ref 0.5–1.3)
EOSINOPHIL # BLD AUTO: 0.14 X10*3/UL (ref 0–0.7)
EOSINOPHIL NFR BLD AUTO: 2.6 %
ERYTHROCYTE [DISTWIDTH] IN BLOOD BY AUTOMATED COUNT: 14.4 % (ref 11.5–14.5)
GFR SERPL CREATININE-BSD FRML MDRD: >90 ML/MIN/1.73M*2
GLUCOSE SERPL-MCNC: 86 MG/DL (ref 74–99)
HBV CORE AB SER QL: REACTIVE
HBV SURFACE AB SER-ACNC: >1000 MIU/ML
HBV SURFACE AG SERPL QL IA: NONREACTIVE
HCT VFR BLD AUTO: 44.9 % (ref 41–52)
HGB BLD-MCNC: 15.3 G/DL (ref 13.5–17.5)
IMM GRANULOCYTES # BLD AUTO: 0.05 X10*3/UL (ref 0–0.7)
IMM GRANULOCYTES NFR BLD AUTO: 0.9 % (ref 0–0.9)
LDH SERPL L TO P-CCNC: 170 U/L (ref 84–246)
LIPASE SERPL-CCNC: 26 U/L (ref 9–82)
LYMPHOCYTES # BLD AUTO: 0.94 X10*3/UL (ref 1.2–4.8)
LYMPHOCYTES NFR BLD AUTO: 17.4 %
MCH RBC QN AUTO: 30 PG (ref 26–34)
MCHC RBC AUTO-ENTMCNC: 34.1 G/DL (ref 32–36)
MCV RBC AUTO: 88 FL (ref 80–100)
MONOCYTES # BLD AUTO: 0.85 X10*3/UL (ref 0.1–1)
MONOCYTES NFR BLD AUTO: 15.8 %
NEUTROPHILS # BLD AUTO: 3.36 X10*3/UL (ref 1.2–7.7)
NEUTROPHILS NFR BLD AUTO: 62.4 %
NRBC BLD-RTO: 0 /100 WBCS (ref 0–0)
PLATELET # BLD AUTO: 219 X10*3/UL (ref 150–450)
PMV BLD AUTO: 9.1 FL (ref 7.5–11.5)
POTASSIUM SERPL-SCNC: 4.5 MMOL/L (ref 3.5–5.3)
PROT SERPL-MCNC: 6.9 G/DL (ref 6.4–8.2)
RBC # BLD AUTO: 5.1 X10*6/UL (ref 4.5–5.9)
SODIUM SERPL-SCNC: 141 MMOL/L (ref 136–145)
TSH SERPL-ACNC: 1.52 MIU/L (ref 0.44–3.98)
WBC # BLD AUTO: 5.4 X10*3/UL (ref 4.4–11.3)

## 2023-10-17 PROCEDURE — 96413 CHEMO IV INFUSION 1 HR: CPT

## 2023-10-17 PROCEDURE — 86706 HEP B SURFACE ANTIBODY: CPT | Mod: CMCLAB

## 2023-10-17 PROCEDURE — 82150 ASSAY OF AMYLASE: CPT

## 2023-10-17 PROCEDURE — 86704 HEP B CORE ANTIBODY TOTAL: CPT

## 2023-10-17 PROCEDURE — 96367 TX/PROPH/DG ADDL SEQ IV INF: CPT

## 2023-10-17 PROCEDURE — 3008F BODY MASS INDEX DOCD: CPT | Performed by: STUDENT IN AN ORGANIZED HEALTH CARE EDUCATION/TRAINING PROGRAM

## 2023-10-17 PROCEDURE — 82533 TOTAL CORTISOL: CPT | Mod: CMCLAB

## 2023-10-17 PROCEDURE — 87340 HEPATITIS B SURFACE AG IA: CPT

## 2023-10-17 PROCEDURE — 87340 HEPATITIS B SURFACE AG IA: CPT | Mod: CMCLAB

## 2023-10-17 PROCEDURE — 82533 TOTAL CORTISOL: CPT

## 2023-10-17 PROCEDURE — 83690 ASSAY OF LIPASE: CPT | Mod: CMCLAB

## 2023-10-17 PROCEDURE — 3075F SYST BP GE 130 - 139MM HG: CPT | Performed by: STUDENT IN AN ORGANIZED HEALTH CARE EDUCATION/TRAINING PROGRAM

## 2023-10-17 PROCEDURE — 2500000004 HC RX 250 GENERAL PHARMACY W/ HCPCS (ALT 636 FOR OP/ED): Performed by: STUDENT IN AN ORGANIZED HEALTH CARE EDUCATION/TRAINING PROGRAM

## 2023-10-17 PROCEDURE — 36415 COLL VENOUS BLD VENIPUNCTURE: CPT

## 2023-10-17 PROCEDURE — 1036F TOBACCO NON-USER: CPT | Performed by: STUDENT IN AN ORGANIZED HEALTH CARE EDUCATION/TRAINING PROGRAM

## 2023-10-17 PROCEDURE — 99214 OFFICE O/P EST MOD 30 MIN: CPT | Mod: 25 | Performed by: STUDENT IN AN ORGANIZED HEALTH CARE EDUCATION/TRAINING PROGRAM

## 2023-10-17 PROCEDURE — 85025 COMPLETE CBC W/AUTO DIFF WBC: CPT

## 2023-10-17 PROCEDURE — 80053 COMPREHEN METABOLIC PANEL: CPT

## 2023-10-17 PROCEDURE — 3080F DIAST BP >= 90 MM HG: CPT | Performed by: STUDENT IN AN ORGANIZED HEALTH CARE EDUCATION/TRAINING PROGRAM

## 2023-10-17 PROCEDURE — 82024 ASSAY OF ACTH: CPT

## 2023-10-17 PROCEDURE — 82150 ASSAY OF AMYLASE: CPT | Mod: CMCLAB

## 2023-10-17 PROCEDURE — 84443 ASSAY THYROID STIM HORMONE: CPT | Mod: CMCLAB

## 2023-10-17 PROCEDURE — 86704 HEP B CORE ANTIBODY TOTAL: CPT | Mod: CMCLAB

## 2023-10-17 PROCEDURE — 84443 ASSAY THYROID STIM HORMONE: CPT

## 2023-10-17 PROCEDURE — 99214 OFFICE O/P EST MOD 30 MIN: CPT | Performed by: STUDENT IN AN ORGANIZED HEALTH CARE EDUCATION/TRAINING PROGRAM

## 2023-10-17 PROCEDURE — 83615 LACTATE (LD) (LDH) ENZYME: CPT

## 2023-10-17 RX ORDER — PROCHLORPERAZINE EDISYLATE 5 MG/ML
10 INJECTION INTRAMUSCULAR; INTRAVENOUS EVERY 6 HOURS PRN
Status: DISCONTINUED | OUTPATIENT
Start: 2023-10-17 | End: 2023-10-17 | Stop reason: HOSPADM

## 2023-10-17 RX ORDER — PROCHLORPERAZINE MALEATE 10 MG
10 TABLET ORAL EVERY 6 HOURS PRN
Status: DISCONTINUED | OUTPATIENT
Start: 2023-10-17 | End: 2023-10-17 | Stop reason: HOSPADM

## 2023-10-17 RX ORDER — ALBUTEROL SULFATE 0.83 MG/ML
3 SOLUTION RESPIRATORY (INHALATION) AS NEEDED
Status: DISCONTINUED | OUTPATIENT
Start: 2023-10-17 | End: 2023-10-17 | Stop reason: HOSPADM

## 2023-10-17 RX ORDER — FAMOTIDINE 10 MG/ML
20 INJECTION INTRAVENOUS ONCE AS NEEDED
Status: DISCONTINUED | OUTPATIENT
Start: 2023-10-17 | End: 2023-10-17 | Stop reason: HOSPADM

## 2023-10-17 RX ORDER — EPINEPHRINE 0.3 MG/.3ML
0.3 INJECTION SUBCUTANEOUS EVERY 5 MIN PRN
Status: DISCONTINUED | OUTPATIENT
Start: 2023-10-17 | End: 2023-10-17 | Stop reason: HOSPADM

## 2023-10-17 RX ORDER — DIPHENHYDRAMINE HYDROCHLORIDE 50 MG/ML
50 INJECTION INTRAMUSCULAR; INTRAVENOUS AS NEEDED
Status: DISCONTINUED | OUTPATIENT
Start: 2023-10-17 | End: 2023-10-17 | Stop reason: HOSPADM

## 2023-10-17 RX ADMIN — SODIUM CHLORIDE 100 MG: 9 INJECTION, SOLUTION INTRAVENOUS at 14:17

## 2023-10-17 RX ADMIN — SODIUM CHLORIDE 360 MG: 9 INJECTION, SOLUTION INTRAVENOUS at 13:39

## 2023-10-17 ASSESSMENT — PAIN SCALES - GENERAL
PAINLEVEL: 0-NO PAIN
PAINLEVEL: 0-NO PAIN

## 2023-10-17 NOTE — PROGRESS NOTES
[facility] - Medical Oncology Follow-Up Visit    Patient ID: Torres Rivera is a 53 y.o. male with NSCLC squamous cell ca     Current therapy: nivolumab (Opdivo) 360 mg in sodium chloride 0.9% 100 mL IV, 360 mg, intravenous, Once, 0 of 9 cycles        ipilimumab (Yervoy) 100 mg in sodium chloride 0.9% 67 mL IV, 1 mg/kg = 100 mg, intravenous, Once, 0 of 9 cycles       Chief Concern: starting treatment    Oncologic History:     DIAGNOSIS  NSCLC squamous cell ca     STAGING  T3N3M0, now with M1a progression      CURRENT SITES OF DISEASE  RLL, R hilar, subcarinal, supraclavicular, LLL     MOLECULAR GENOMICS  RLL:  PD-L1 <1%  PIK3CA amplification  TP53 splice site ([id number] c.[id number]>T)     LLL:  PD-L1 10%  PIK3CA amplification  TP53 splice site ([id number] c.[id number]>T)      PRIOR THERAPY  concurrent chemoradiation weekly carbo/taxol 2/21/23 - 4/3/23  durvalumab 4/25/23 - 5/9/23 (2 doses)     CURRENT THERAPY  Ipilimumab/nivolumab 10/17/23 - present     CURRENT ONCOLOGICAL PROBLEMS           HISTORY OF PRESENT ILLNESS  Torres Rivera is a 53 yo M PMHx tobacco use disorder presenting for new visit for suspected lung cancer.      Pt presented to [facility] on 12/31/22 for acute chest pain. CT PE showed a mass like opacity in the RLL 5.9x4.3x2.5cm with subcarinal and R hilar adenopathy and postobstructive pneumonia. Mild interlobular septal thickening at the R lung base  was thought possibly due to lymphangitic carcinomatosis. He was prescribed antibiotics and prednisone. His chest pain resolved after a few days. He otherwise reports he had felt in good health. Denied new SOB, cough, weight loss, fatigue, change in appetite,  fever or chills. He has stable chronic SOB from smoking. He underwent bronchoscopy with biopsy on 1/11/23 at  with pathology revealing squamous cell ca of station 7, NGS without targetable mutation,  insufficient tissue for PD-L1. PET/CT revealed  FDG-avidity of R supclavicular node as well. Biopsy of the supraclavicular node positive for squamous cell ca. Offered concurrent chemoradiation with weekly carbo/taxol which he started on 2/21/23 and ended  4/3/23. He started durvalumab 4/25/23. Course c/b pneumonitis, and durvalumab held after 2 doses (5/2023). Treated with steroids with improvement. Restaging scans unfortunately with concern for new LLL nodule, and PET/CT 8/2023 with FDG avidity of new  lesion. Biopsy done 9/22/23, with pathology consistent with squamous cell ca, same PIK3CA and TP53 mutations. He is not interested in any further radiation and strongly prefers immunotherapy. Discussed risk of recurrent pneumonitis, which he understands. Plan to move forward with ipi/nivo which started 10/17/23.         PAST MEDICAL HISTORY  HTN  Tobacco use disorder   BPH  HLD  Osteoporosis   Psoriasis         SOCIAL HISTORY  Home: lives with his wife   Work:   Tobacco: quit 1/2023. 1PPD x 30 yrs   Alcohol: ~4 drinks/week  Drugs: none        CURRENT MEDS  Lisinopril 5  Lovastatin 10  Metoprolol succinate 100mg   Albuterol PRN         ALLERGIES  NKDA     FAMILY HISTORY   No family history of lung cancer        HPI   He is here today with his wife. He feels ready to start treatment. He had a cold or flu like virus which has mostly resolved, but he has some residual throat/upper chest congestion with mild cough. His wife also has the same. No complaints.       Meds (Current):    Current Outpatient Medications:     lisinopril 5 mg tablet, Take 1 tablet (5 mg) by mouth once daily., Disp: 90 tablet, Rfl: 2    oxyCODONE (Oxy-IR) 5 mg immediate release capsule, 1 CAP(S) ORALLY 4 TIMES A DAY, AS NEEDED, Disp: , Rfl:     pantoprazole (ProtoNix) 20 mg EC tablet, , Disp: , Rfl:     predniSONE (Deltasone) 10 mg tablet, , Disp: , Rfl:     prochlorperazine (Compazine) 10 mg tablet, , Disp: , Rfl:     prochlorperazine (Compazine) 10 mg tablet, Take 1 tablet (10 mg)  by mouth every 6 hours if needed for nausea or vomiting., Disp: 30 tablet, Rfl: 5    rosuvastatin (Crestor) 10 mg tablet, Take 1 tablet (10 mg) by mouth once daily., Disp: 30 tablet, Rfl: 5    sucralfate (Carafate) 1 gram tablet, , Disp: , Rfl:     sulfamethoxazole-trimethoprim (Bactrim DS) 800-160 mg tablet, , Disp: , Rfl:     Ventolin HFA 90 mcg/actuation inhaler, Inhale., Disp: , Rfl:     Review of Systems   All other systems reviewed and are negative.       Objective   BSA: 2.19 meters squared  BP (!) 137/93 (BP Location: Right arm, Patient Position: Sitting) Comment: 161/98  Pulse 100   Temp 36.2 °C (97.2 °F) (Core)   Resp 18   Wt 99.9 kg (220 lb 3.8 oz)   SpO2 97%   BMI 33.50 kg/m²   Performance Status:  Asymptomatic 0     Physical Exam  Vitals reviewed.   Constitutional:       General: He is not in acute distress.     Appearance: Normal appearance.   HENT:      Head: Normocephalic and atraumatic.      Mouth/Throat:      Mouth: Mucous membranes are moist.   Eyes:      Extraocular Movements: Extraocular movements intact.      Conjunctiva/sclera: Conjunctivae normal.      Pupils: Pupils are equal, round, and reactive to light.   Cardiovascular:      Rate and Rhythm: Normal rate and regular rhythm.      Heart sounds: Normal heart sounds. No murmur heard.     No gallop.   Pulmonary:      Effort: Pulmonary effort is normal. No respiratory distress.      Breath sounds: Normal breath sounds. No wheezing or rales.   Abdominal:      General: Bowel sounds are normal. There is no distension.      Palpations: Abdomen is soft.      Tenderness: There is no abdominal tenderness.   Skin:     General: Skin is warm and dry.   Neurological:      General: No focal deficit present.      Mental Status: He is alert and oriented to person, place, and time.   Psychiatric:         Mood and Affect: Mood normal.         Behavior: Behavior normal.          Results:  Labs:  Lab Results   Component Value Date    WBC 5.5 08/22/2023     HGB 14.5 08/22/2023    HCT 42.7 08/22/2023    MCV 86 08/22/2023     08/22/2023      Lab Results   Component Value Date    NEUTROABS 3.49 08/22/2023      Lab Results   Component Value Date    GLUCOSE 91 08/22/2023    CALCIUM 9.0 08/22/2023     08/22/2023    K 4.0 08/22/2023    CO2 23 08/22/2023     (H) 08/22/2023    BUN 16 08/22/2023    CREATININE 0.68 08/22/2023     Lab Results   Component Value Date    ALT 40 08/22/2023    AST 22 08/22/2023    ALKPHOS 76 08/22/2023    BILITOT 0.7 08/22/2023      Lab Results   Component Value Date    TSH 0.47 07/11/2023       Imaging:  I have personally reviewed the below imaging and concur with the reported findings unless otherwise stated:    Assessment/Plan      Torres Rivera is a 53 y.o. male with PMHx HTN, psoriasis, and tobacco use who presents with NSCLC squamous cell ca, no targetable mutations, neg PD-L1 who is s/p concurrent chemoradiation with weekly carbo/taxol then progression after holding durvalumab due to G2 pneumonitis. Starting ipi/nivo per patient preference.     #NSCLC squamous cell ca  - T3N3M0, Stage IIIC originally, now with M1a progression  - PD-L1 neg, no targetable mutations  - supraclavicular node biopsy-proven squamous cell ca  - met Dr. Lew and discussed concurrent chemoradiation  - he has concerns about radiation and treatment in general   - discussed if we move forward with curative-intent concurrent chemoradiation, would receive weekly carbo/taxol. discussed palliative treatment with combination chemo-immunotherapy would be the non-radiation option, unfortunately surgery is not a viable  option.  - discussed potential side effects of carbo/taxol including but not limited to allergic reaction, nausea/vomiting, diarrhea/constipation, fatigue, injury to liver/kidney, risk of infection, anemia/thrombocytopenia. consented 2/7/23  - C1D1 concurrent chemoRT with weekly carbo/taxol 2/21/23  - discussed a year of durvalumab  afterwards broadly  - 3/28/23 Carbo/Taxol held for thrombocytopenia (66), given IVF in infusion for decreased fluid intake/tachycardia  -last radiation 4/3/23; last Carbo/Taxol 3/21/23  - consented for 1 year durvalumab 4/25/23 and received two doses  - CT scan concerning for pneumonitis in view of worsening SOB, prednisone initiated at 1mg/kg/day (90mg) as well as PPI/Bactrim prophylactically, completed steroid taper with resolution of symptoms.   - personally reviewed PET/CT with FDG-avidity  of LLL lesion and possible abdominal lymph node; will obtain biopsy of LLL lesion  - personally reviewed brain MRI without evidence of disease  - discussed results of biopsy of LLL nodule, with same histology and biomarkers. PD-L1 10%. Discussed likelihood that this is M1a progression. He remains uninterested in any further radiation and strongly would like to pursue immunotherapy. Discussed potential of recurrent pneumonitis. Discussed clinical trial option, which he is currently also not interested in.  - will send full NGS tempus on tissue   - discussed ipi/nivo and risks, particularly risk of recurrent pneumonitis. Consented 10/17/23 and C1D1 today.  - will scan after C2  - RTC 1 week for toxicity check     # G2 pneumonitis-irAE vs radiation pneumonitis- resolved  -prednisone initiated at 1mg/kg (90mg) and completed steroid taper     #Anxiety  -related to new suspected diagnosis of lung cancer  -Appreciate SW support     #Psoriasis  -Reports previously diagnosed by dermatology and was previously on Otezla when rash was more diffuse  -Currently on topical steroid, reports minimal benefit. Small rash present on the elbow  - offered dermatology referral, and he deferred     Belia Blevins MD  [facility]

## 2023-10-24 ENCOUNTER — OFFICE VISIT (OUTPATIENT)
Dept: HEMATOLOGY/ONCOLOGY | Facility: HOSPITAL | Age: 53
End: 2023-10-24
Payer: COMMERCIAL

## 2023-10-24 DIAGNOSIS — C34.31 PRIMARY SQUAMOUS CELL CARCINOMA OF LOWER LOBE OF RIGHT LUNG (MULTI): Primary | ICD-10-CM

## 2023-10-24 PROCEDURE — 3008F BODY MASS INDEX DOCD: CPT | Performed by: NURSE PRACTITIONER

## 2023-10-24 PROCEDURE — 1036F TOBACCO NON-USER: CPT | Performed by: NURSE PRACTITIONER

## 2023-10-24 PROCEDURE — 99441 PR PHYS/QHP TELEPHONE EVALUATION 5-10 MIN: CPT | Performed by: NURSE PRACTITIONER

## 2023-10-24 NOTE — PROGRESS NOTES
[facility] - Medical Oncology Follow-Up Visit    Patient ID: Torres Rivera is a 53 y.o. male      Current therapy: nivolumab (Opdivo) 360 mg in sodium chloride 0.9% 100 mL IV, 360 mg, intravenous, Once, 1 of 9 cycles    Administration: 360 mg (10/17/2023)        ipilimumab (Yervoy) 100 mg in sodium chloride 0.9% 50 mL IV, 1 mg/kg = 100 mg, intravenous, Once, 1 of 9 cycles    Administration: 100 mg (10/17/2023)      Oncologic History:   DIAGNOSIS  NSCLC squamous cell ca     STAGING  T3N3M0, now with M1a progression      CURRENT SITES OF DISEASE  RLL, R hilar, subcarinal, supraclavicular, LLL     MOLECULAR GENOMICS  RLL:  PD-L1 <1%  PIK3CA amplification  TP53 splice site ([id number] c.673-1G>T)     LLL:  PD-L1 10%  PIK3CA amplification  TP53 splice site ([id number] c.[id number]>T)      PRIOR THERAPY  concurrent chemoradiation weekly carbo/taxol 2/21/23 - 4/3/23  durvalumab 4/25/23 - 5/9/23 (2 doses)     CURRENT THERAPY  Ipilimumab/nivolumab 10/17/23 - present     CURRENT ONCOLOGICAL PROBLEMS           HISTORY OF PRESENT ILLNESS  Torres Rivera is a 53 yo M PMHx tobacco use disorder presenting for new visit for suspected lung cancer.      Pt presented to [facility] on 12/31/22 for acute chest pain. CT PE showed a mass like opacity in the RLL 5.9x4.3x2.5cm with subcarinal and R hilar adenopathy and postobstructive pneumonia. Mild interlobular septal thickening at the R lung base  was thought possibly due to lymphangitic carcinomatosis. He was prescribed antibiotics and prednisone. His chest pain resolved after a few days. He otherwise reports he had felt in good health. Denied new SOB, cough, weight loss, fatigue, change in appetite,  fever or chills. He has stable chronic SOB from smoking. He underwent bronchoscopy with biopsy on 1/11/23 at  with pathology revealing squamous cell ca of station 7, NGS without targetable mutation,  insufficient tissue for PD-L1. PET/CT revealed  FDG-avidity of R supclavicular node as well. Biopsy of the supraclavicular node positive for squamous cell ca. Offered concurrent chemoradiation with weekly carbo/taxol which he started on 2/21/23 and ended  4/3/23. He started durvalumab 4/25/23. Course c/b pneumonitis, and durvalumab held after 2 doses (5/2023). Treated with steroids with improvement. Restaging scans unfortunately with concern for new LLL nodule, and PET/CT 8/2023 with FDG avidity of new  lesion. Biopsy done 9/22/23, with pathology consistent with squamous cell ca, same PIK3CA and TP53 mutations. He is not interested in any further radiation and strongly prefers immunotherapy. Discussed risk of recurrent pneumonitis, which he understands. Plan to move forward with ipi/nivo which started 10/17/23.         PAST MEDICAL HISTORY  HTN  Tobacco use disorder   BPH  HLD  Osteoporosis   Psoriasis         SOCIAL HISTORY  Home: lives with his wife   Work:   Tobacco: quit 1/2023. 1PPD x 30 yrs   Alcohol: ~4 drinks/week  Drugs: none        CURRENT MEDS  Lisinopril 5  Lovastatin 10  Metoprolol succinate 100mg   Albuterol PRN         ALLERGIES  NKDA     FAMILY HISTORY   No family history of lung cancer         Chief Concern: Phone visit follow-up s/p C1 Ipi/Nivo; toxicity check    HPI Phone visit follow-up s/p C1 Ipi/Nivo. Patient states he is feeling perfect. He is in [place] right now. Breathing is good, no CP or SOB. Denies any GI symptoms. No new aches/pains. No neurological symptoms. No fevers, chills, or cold symptoms. No other concerns or complaints.       Meds (Current):    Current Outpatient Medications:     lisinopril 5 mg tablet, Take 1 tablet (5 mg) by mouth once daily., Disp: 90 tablet, Rfl: 2    oxyCODONE (Oxy-IR) 5 mg immediate release capsule, 1 CAP(S) ORALLY 4 TIMES A DAY, AS NEEDED, Disp: , Rfl:     pantoprazole (ProtoNix) 20 mg EC tablet, , Disp: , Rfl:     predniSONE (Deltasone) 10 mg tablet, , Disp: , Rfl:     prochlorperazine  (Compazine) 10 mg tablet, , Disp: , Rfl:     prochlorperazine (Compazine) 10 mg tablet, Take 1 tablet (10 mg) by mouth every 6 hours if needed for nausea or vomiting. (Patient not taking: Reported on 10/17/2023), Disp: 30 tablet, Rfl: 5    rosuvastatin (Crestor) 10 mg tablet, Take 1 tablet (10 mg) by mouth once daily., Disp: 30 tablet, Rfl: 5    sucralfate (Carafate) 1 gram tablet, , Disp: , Rfl:     sulfamethoxazole-trimethoprim (Bactrim DS) 800-160 mg tablet, , Disp: , Rfl:     Ventolin HFA 90 mcg/actuation inhaler, Inhale., Disp: , Rfl:     Review of Systems - Oncology 12 point ROS was obtained and negative unless otherwise mentioned in the above HPI.      Objective   BSA: There is no height or weight on file to calculate BSA.  Wt Readings from Last 5 Encounters:   10/17/23 100 kg (220 lb 14.4 oz)   10/17/23 99.9 kg (220 lb 3.8 oz)   09/26/23 98.4 kg (216 lb 14.9 oz)   07/11/23 98 kg (216 lb)   06/27/23 96.8 kg (213 lb 6.5 oz)     There were no vitals taken for this visit.    ECOG Score: 0- Fully active, able to carry on all pre-disease performance w/o restriction.      Physical Exam Not performed due to visit type.      Results:  Labs:  Lab Results   Component Value Date    WBC 5.4 10/17/2023    HGB 15.3 10/17/2023    HCT 44.9 10/17/2023    MCV 88 10/17/2023     10/17/2023      Lab Results   Component Value Date    NEUTROABS 3.36 10/17/2023      Lab Results   Component Value Date    GLUCOSE 86 10/17/2023    CALCIUM 9.8 10/17/2023     10/17/2023    K 4.5 10/17/2023    CO2 26 10/17/2023     10/17/2023    BUN 15 10/17/2023    CREATININE 0.81 10/17/2023     Lab Results   Component Value Date    ALT 32 10/17/2023    AST 23 10/17/2023    ALKPHOS 87 10/17/2023    BILITOT 0.6 10/17/2023      Lab Results   Component Value Date    CORTISOL 6.1 10/17/2023    TSH 1.52 10/17/2023       Imaging:  I have personally reviewed the below imaging and concur with the reported findings unless otherwise stated:          === Results for orders placed in visit on 09/21/23 ===    MR brain w and wo IV contrast [[id number]] 09/21/2023    Status: Normal  No evidence of intracranial metastatic disease was identified at this  time.    MACRO:  None    Assessment/Plan    Torres Rivera is a 53 y.o. male with PMHx HTN, psoriasis, and tobacco use who presented with NSCLC squamous cell ca, no targetable mutations, neg PD-L1 who is s/p concurrent chemoradiation with weekly carbo/taxol then progression after holding durvalumab due to G2 pneumonitis. Starting ipi/nivo per patient preference.      #NSCLC squamous cell ca  - T3N3M0, Stage IIIC originally, now with M1a progression  - PD-L1 neg, no targetable mutations  - supraclavicular node biopsy-proven squamous cell ca  - met Dr. Lew and discussed concurrent chemoradiation  - he has concerns about radiation and treatment in general   - discussed if we move forward with curative-intent concurrent chemoradiation, would receive weekly carbo/taxol. discussed palliative treatment with combination chemo-immunotherapy would be the non-radiation option, unfortunately surgery is not a viable  option.  - discussed potential side effects of carbo/taxol including but not limited to allergic reaction, nausea/vomiting, diarrhea/constipation, fatigue, injury to liver/kidney, risk of infection, anemia/thrombocytopenia. consented 2/7/23  - C1D1 concurrent chemoRT with weekly carbo/taxol 2/21/23  - discussed a year of durvalumab afterwards broadly  - 3/28/23 Carbo/Taxol held for thrombocytopenia (66), given IVF in infusion for decreased fluid intake/tachycardia  -last radiation 4/3/23; last Carbo/Taxol 3/21/23  - consented for 1 year durvalumab 4/25/23 and received two doses  - CT scan concerning for pneumonitis in view of worsening SOB, prednisone initiated at 1mg/kg/day (90mg) as well as PPI/Bactrim prophylactically, completed steroid taper with resolution of symptoms.   - personally reviewed PET/CT with  FDG-avidity  of LLL lesion and possible abdominal lymph node; will obtain biopsy of LLL lesion  - personally reviewed brain MRI without evidence of disease  - discussed results of biopsy of LLL nodule, with same histology and biomarkers. PD-L1 10%. Discussed likelihood that this is M1a progression. He remains uninterested in any further radiation and strongly would like to pursue immunotherapy. Discussed potential of recurrent pneumonitis. Discussed clinical trial option, which he is currently also not interested in.  - sending full NGS tempus on tissue   - discussed ipi/nivo and risks, particularly risk of recurrent pneumonitis. Consented 10/17/23 and C1D1 10/17/23  - will scan after C2  - RTC 11/7/23 for C2D22     # G2 pneumonitis-irAE vs radiation pneumonitis- resolved  -prednisone initiated at 1mg/kg (90mg) and completed steroid taper     #Anxiety  -related to new suspected diagnosis of lung cancer  -Appreciate SW support     #Psoriasis  -Reports previously diagnosed by dermatology and was previously on Otezla when rash was more diffuse  -Currently on topical steroid, reports minimal benefit. Small rash present on the elbow  - offered dermatology referral, and he deferred

## 2023-11-07 ENCOUNTER — OFFICE VISIT (OUTPATIENT)
Dept: HEMATOLOGY/ONCOLOGY | Facility: HOSPITAL | Age: 53
End: 2023-11-07
Payer: COMMERCIAL

## 2023-11-07 ENCOUNTER — INFUSION (OUTPATIENT)
Dept: HEMATOLOGY/ONCOLOGY | Facility: HOSPITAL | Age: 53
End: 2023-11-07
Payer: COMMERCIAL

## 2023-11-07 VITALS
WEIGHT: 221.8 LBS | HEART RATE: 98 BPM | RESPIRATION RATE: 16 BRPM | SYSTOLIC BLOOD PRESSURE: 137 MMHG | TEMPERATURE: 96.4 F | BODY MASS INDEX: 33.73 KG/M2 | DIASTOLIC BLOOD PRESSURE: 96 MMHG | OXYGEN SATURATION: 96 %

## 2023-11-07 DIAGNOSIS — C34.31 PRIMARY SQUAMOUS CELL CARCINOMA OF LOWER LOBE OF RIGHT LUNG (MULTI): Primary | ICD-10-CM

## 2023-11-07 DIAGNOSIS — C34.31 PRIMARY SQUAMOUS CELL CARCINOMA OF LOWER LOBE OF RIGHT LUNG (MULTI): ICD-10-CM

## 2023-11-07 DIAGNOSIS — Z51.12 ENCOUNTER FOR ANTINEOPLASTIC IMMUNOTHERAPY: ICD-10-CM

## 2023-11-07 LAB
ALBUMIN SERPL BCP-MCNC: 4.2 G/DL (ref 3.4–5)
ALP SERPL-CCNC: 97 U/L (ref 33–120)
ALT SERPL W P-5'-P-CCNC: 28 U/L (ref 10–52)
ANION GAP SERPL CALC-SCNC: 14 MMOL/L (ref 10–20)
AST SERPL W P-5'-P-CCNC: 20 U/L (ref 9–39)
BASOPHILS # BLD AUTO: 0.05 X10*3/UL (ref 0–0.1)
BASOPHILS NFR BLD AUTO: 0.9 %
BILIRUB SERPL-MCNC: 0.4 MG/DL (ref 0–1.2)
BUN SERPL-MCNC: 17 MG/DL (ref 6–23)
CALCIUM SERPL-MCNC: 9.7 MG/DL (ref 8.6–10.3)
CHLORIDE SERPL-SCNC: 105 MMOL/L (ref 98–107)
CO2 SERPL-SCNC: 23 MMOL/L (ref 21–32)
CREAT SERPL-MCNC: 0.64 MG/DL (ref 0.5–1.3)
EOSINOPHIL # BLD AUTO: 0.28 X10*3/UL (ref 0–0.7)
EOSINOPHIL NFR BLD AUTO: 4.8 %
ERYTHROCYTE [DISTWIDTH] IN BLOOD BY AUTOMATED COUNT: 14.3 % (ref 11.5–14.5)
GFR SERPL CREATININE-BSD FRML MDRD: >90 ML/MIN/1.73M*2
GLUCOSE SERPL-MCNC: 108 MG/DL (ref 74–99)
HCT VFR BLD AUTO: 42.8 % (ref 41–52)
HGB BLD-MCNC: 14.5 G/DL (ref 13.5–17.5)
IMM GRANULOCYTES # BLD AUTO: 0.05 X10*3/UL (ref 0–0.7)
IMM GRANULOCYTES NFR BLD AUTO: 0.9 % (ref 0–0.9)
LYMPHOCYTES # BLD AUTO: 1.08 X10*3/UL (ref 1.2–4.8)
LYMPHOCYTES NFR BLD AUTO: 18.6 %
MCH RBC QN AUTO: 29.3 PG (ref 26–34)
MCHC RBC AUTO-ENTMCNC: 33.9 G/DL (ref 32–36)
MCV RBC AUTO: 87 FL (ref 80–100)
MONOCYTES # BLD AUTO: 0.8 X10*3/UL (ref 0.1–1)
MONOCYTES NFR BLD AUTO: 13.7 %
NEUTROPHILS # BLD AUTO: 3.56 X10*3/UL (ref 1.2–7.7)
NEUTROPHILS NFR BLD AUTO: 61.1 %
NRBC BLD-RTO: 0 /100 WBCS (ref 0–0)
PLATELET # BLD AUTO: 276 X10*3/UL (ref 150–450)
POTASSIUM SERPL-SCNC: 4.1 MMOL/L (ref 3.5–5.3)
PROT SERPL-MCNC: 6.6 G/DL (ref 6.4–8.2)
RBC # BLD AUTO: 4.95 X10*6/UL (ref 4.5–5.9)
SODIUM SERPL-SCNC: 138 MMOL/L (ref 136–145)
TSH SERPL-ACNC: 2.05 MIU/L (ref 0.44–3.98)
WBC # BLD AUTO: 5.8 X10*3/UL (ref 4.4–11.3)

## 2023-11-07 PROCEDURE — 99214 OFFICE O/P EST MOD 30 MIN: CPT | Performed by: NURSE PRACTITIONER

## 2023-11-07 PROCEDURE — 80053 COMPREHEN METABOLIC PANEL: CPT

## 2023-11-07 PROCEDURE — 84443 ASSAY THYROID STIM HORMONE: CPT

## 2023-11-07 PROCEDURE — 85025 COMPLETE CBC W/AUTO DIFF WBC: CPT

## 2023-11-07 PROCEDURE — 2500000004 HC RX 250 GENERAL PHARMACY W/ HCPCS (ALT 636 FOR OP/ED): Performed by: STUDENT IN AN ORGANIZED HEALTH CARE EDUCATION/TRAINING PROGRAM

## 2023-11-07 PROCEDURE — 36415 COLL VENOUS BLD VENIPUNCTURE: CPT

## 2023-11-07 PROCEDURE — 1036F TOBACCO NON-USER: CPT | Performed by: NURSE PRACTITIONER

## 2023-11-07 PROCEDURE — 96413 CHEMO IV INFUSION 1 HR: CPT

## 2023-11-07 PROCEDURE — 3075F SYST BP GE 130 - 139MM HG: CPT | Performed by: NURSE PRACTITIONER

## 2023-11-07 PROCEDURE — 3080F DIAST BP >= 90 MM HG: CPT | Performed by: NURSE PRACTITIONER

## 2023-11-07 PROCEDURE — 3008F BODY MASS INDEX DOCD: CPT | Performed by: NURSE PRACTITIONER

## 2023-11-07 RX ORDER — FAMOTIDINE 10 MG/ML
20 INJECTION INTRAVENOUS ONCE AS NEEDED
Status: DISCONTINUED | OUTPATIENT
Start: 2023-11-07 | End: 2023-11-07 | Stop reason: HOSPADM

## 2023-11-07 RX ORDER — EPINEPHRINE 0.3 MG/.3ML
0.3 INJECTION SUBCUTANEOUS EVERY 5 MIN PRN
Status: DISCONTINUED | OUTPATIENT
Start: 2023-11-07 | End: 2023-11-07 | Stop reason: HOSPADM

## 2023-11-07 RX ORDER — DIPHENHYDRAMINE HYDROCHLORIDE 50 MG/ML
50 INJECTION INTRAMUSCULAR; INTRAVENOUS AS NEEDED
Status: DISCONTINUED | OUTPATIENT
Start: 2023-11-07 | End: 2023-11-07 | Stop reason: HOSPADM

## 2023-11-07 RX ORDER — PROCHLORPERAZINE MALEATE 10 MG
10 TABLET ORAL EVERY 6 HOURS PRN
Status: DISCONTINUED | OUTPATIENT
Start: 2023-11-07 | End: 2023-11-07 | Stop reason: HOSPADM

## 2023-11-07 RX ORDER — PROCHLORPERAZINE EDISYLATE 5 MG/ML
10 INJECTION INTRAMUSCULAR; INTRAVENOUS EVERY 6 HOURS PRN
Status: DISCONTINUED | OUTPATIENT
Start: 2023-11-07 | End: 2023-11-07 | Stop reason: HOSPADM

## 2023-11-07 RX ORDER — ALBUTEROL SULFATE 0.83 MG/ML
3 SOLUTION RESPIRATORY (INHALATION) AS NEEDED
Status: DISCONTINUED | OUTPATIENT
Start: 2023-11-07 | End: 2023-11-07 | Stop reason: HOSPADM

## 2023-11-07 RX ADMIN — SODIUM CHLORIDE 360 MG: 9 INJECTION, SOLUTION INTRAVENOUS at 16:03

## 2023-11-07 ASSESSMENT — PAIN SCALES - GENERAL: PAINLEVEL: 0-NO PAIN

## 2023-11-07 NOTE — PROGRESS NOTES
[facility] - Medical Oncology Follow-Up Visit    Patient ID: Torres Rivera is a 53 y.o. male      Current therapy: nivolumab (Opdivo) 360 mg in sodium chloride 0.9% 100 mL IV, 360 mg, intravenous, Once, 1 of 9 cycles    Administration: 360 mg (10/17/2023)        ipilimumab (Yervoy) 100 mg in sodium chloride 0.9% 50 mL IV, 1 mg/kg = 100 mg, intravenous, Once, 1 of 9 cycles    Administration: 100 mg (10/17/2023)      Oncologic History:   Oncologic History:   DIAGNOSIS  NSCLC squamous cell ca     STAGING  T3N3M0, now with M1a progression      CURRENT SITES OF DISEASE  RLL, R hilar, subcarinal, supraclavicular, LLL     MOLECULAR GENOMICS  RLL:  PD-L1 <1%  PIK3CA amplification  TP53 splice site (NM_[id number] c.673-1G>T)     LLL:  PD-L1 10%  PIK3CA amplification  TP53 splice site ([id number] c.673-1G>T)      PRIOR THERAPY  concurrent chemoradiation weekly carbo/taxol 2/21/23 - 4/3/23  durvalumab 4/25/23 - 5/9/23 (2 doses)     CURRENT THERAPY  Ipilimumab/nivolumab 10/17/23 - present     CURRENT ONCOLOGICAL PROBLEMS           HISTORY OF PRESENT ILLNESS  Torres Rivera is a 53 yo M PMHx tobacco use disorder presenting for new visit for suspected lung cancer.      Pt presented to [facility] on 12/31/22 for acute chest pain. CT PE showed a mass like opacity in the RLL 5.9x4.3x2.5cm with subcarinal and R hilar adenopathy and postobstructive pneumonia. Mild interlobular septal thickening at the R lung base  was thought possibly due to lymphangitic carcinomatosis. He was prescribed antibiotics and prednisone. His chest pain resolved after a few days. He otherwise reports he had felt in good health. Denied new SOB, cough, weight loss, fatigue, change in appetite,  fever or chills. He has stable chronic SOB from smoking. He underwent bronchoscopy with biopsy on 1/11/23 at  with pathology revealing squamous cell ca of station 7, NGS without targetable mutation,  insufficient tissue for  PD-L1. PET/CT revealed FDG-avidity of R supclavicular node as well. Biopsy of the supraclavicular node positive for squamous cell ca. Offered concurrent chemoradiation with weekly carbo/taxol which he started on 2/21/23 and ended  4/3/23. He started durvalumab 4/25/23. Course c/b pneumonitis, and durvalumab held after 2 doses (5/2023). Treated with steroids with improvement. Restaging scans unfortunately with concern for new LLL nodule, and PET/CT 8/2023 with FDG avidity of new  lesion. Biopsy done 9/22/23, with pathology consistent with squamous cell ca, same PIK3CA and TP53 mutations. He is not interested in any further radiation and strongly prefers immunotherapy. Discussed risk of recurrent pneumonitis, which he understands. Plan to move forward with ipi/nivo which started 10/17/23.         PAST MEDICAL HISTORY  HTN  Tobacco use disorder   BPH  HLD  Osteoporosis   Psoriasis         SOCIAL HISTORY  Home: lives with his wife   Work:   Tobacco: quit 1/2023. 1PPD x 30 yrs   Alcohol: ~4 drinks/week  Drugs: none        CURRENT MEDS  Lisinopril 5  Lovastatin 10  Metoprolol succinate 100mg   Albuterol PRN         ALLERGIES  NKDA     FAMILY HISTORY   No family history of lung cancer      Chief Concern: Here in clinic for follow-up and treatment with nivolumab     HPI Patient is here today in clinic with his wife for follow-up and treatment with nivolumab. He is feeling well for treatment. Breathing is good, no CP or SOB. Denies abdominal pain, N/V/D/C. Appetite is good. Taking occasional anti-emetic for nausea/belching and relieves symptoms. No new aches/pains. No neurological symptoms. No other concerns or complaints.      Meds (Current):    Current Outpatient Medications:     lisinopril 5 mg tablet, Take 1 tablet (5 mg) by mouth once daily., Disp: 90 tablet, Rfl: 2    oxyCODONE (Oxy-IR) 5 mg immediate release capsule, 1 CAP(S) ORALLY 4 TIMES A DAY, AS NEEDED, Disp: , Rfl:     pantoprazole (ProtoNix) 20 mg  EC tablet, , Disp: , Rfl:     predniSONE (Deltasone) 10 mg tablet, , Disp: , Rfl:     prochlorperazine (Compazine) 10 mg tablet, , Disp: , Rfl:     prochlorperazine (Compazine) 10 mg tablet, Take 1 tablet (10 mg) by mouth every 6 hours if needed for nausea or vomiting. (Patient not taking: Reported on 10/17/2023), Disp: 30 tablet, Rfl: 5    rosuvastatin (Crestor) 10 mg tablet, Take 1 tablet (10 mg) by mouth once daily., Disp: 30 tablet, Rfl: 5    sucralfate (Carafate) 1 gram tablet, , Disp: , Rfl:     sulfamethoxazole-trimethoprim (Bactrim DS) 800-160 mg tablet, , Disp: , Rfl:     Ventolin HFA 90 mcg/actuation inhaler, Inhale., Disp: , Rfl:     Review of Systems - Oncology 12 point ROS was obtained and negative unless otherwise mentioned in the above HPI.      Objective   BSA: 2.2 meters squared  Wt Readings from Last 5 Encounters:   11/07/23 101 kg (221 lb 12.8 oz)   10/17/23 100 kg (220 lb 14.4 oz)   10/17/23 99.9 kg (220 lb 3.8 oz)   09/26/23 98.4 kg (216 lb 14.9 oz)   07/11/23 98 kg (216 lb)     BP (!) 137/96 (BP Location: Right arm, Patient Position: Sitting)   Pulse 98   Temp 35.8 °C (96.4 °F) (Core)   Resp 16   Wt 101 kg (221 lb 12.8 oz)   SpO2 96%   BMI 33.73 kg/m²     ECOG Score: 0- Fully active, able to carry on all pre-disease performance w/o restriction.      Physical Exam  Constitutional:       Appearance: Normal appearance.   Eyes:      Pupils: Pupils are equal, round, and reactive to light.   Cardiovascular:      Rate and Rhythm: Normal rate and regular rhythm.   Pulmonary:      Effort: Pulmonary effort is normal.      Breath sounds: Normal breath sounds.   Abdominal:      General: Bowel sounds are normal.      Palpations: Abdomen is soft.   Musculoskeletal:         General: Normal range of motion.   Skin:     General: Skin is warm and dry.   Neurological:      General: No focal deficit present.      Mental Status: He is alert and oriented to person, place, and time.   Psychiatric:         Mood  and Affect: Mood normal.         Behavior: Behavior normal.          Results:  Labs:  Lab Results   Component Value Date    WBC 5.4 10/17/2023    HGB 15.3 10/17/2023    HCT 44.9 10/17/2023    MCV 88 10/17/2023     10/17/2023      Lab Results   Component Value Date    NEUTROABS 3.36 10/17/2023      Lab Results   Component Value Date    GLUCOSE 86 10/17/2023    CALCIUM 9.8 10/17/2023     10/17/2023    K 4.5 10/17/2023    CO2 26 10/17/2023     10/17/2023    BUN 15 10/17/2023    CREATININE 0.81 10/17/2023     Lab Results   Component Value Date    ALT 32 10/17/2023    AST 23 10/17/2023    ALKPHOS 87 10/17/2023    BILITOT 0.6 10/17/2023      Lab Results   Component Value Date    CORTISOL 6.1 10/17/2023    TSH 1.52 10/17/2023       Imaging:           === Results for orders placed in visit on 09/21/23 ===    MR brain w and wo IV contrast [[id number]] 09/21/2023    Status: Normal  No evidence of intracranial metastatic disease was identified at this  time.    MACRO:  None    Assessment/Plan      Torres Rivera is a 53 y.o. male here for follow up     Torres Rivera is a 53 y.o. male with PMHx HTN, psoriasis, and tobacco use who presented with NSCLC squamous cell ca, no targetable mutations, neg PD-L1 who is s/p concurrent chemoradiation with weekly carbo/taxol then progression after holding durvalumab due to G2 pneumonitis. Starting ipi/nivo per patient preference.      #NSCLC squamous cell ca  - T3N3M0, Stage IIIC originally, now with M1a progression  - PD-L1 neg, no targetable mutations  - supraclavicular node biopsy-proven squamous cell ca  - met Dr. Lew and discussed concurrent chemoradiation  - he has concerns about radiation and treatment in general   - discussed if we move forward with curative-intent concurrent chemoradiation, would receive weekly carbo/taxol. discussed palliative treatment with combination chemo-immunotherapy would be the non-radiation option, unfortunately surgery is not a  viable  option.  - discussed potential side effects of carbo/taxol including but not limited to allergic reaction, nausea/vomiting, diarrhea/constipation, fatigue, injury to liver/kidney, risk of infection, anemia/thrombocytopenia. consented 2/7/23  - C1D1 concurrent chemoRT with weekly carbo/taxol 2/21/23  - discussed a year of durvalumab afterwards broadly  - 3/28/23 Carbo/Taxol held for thrombocytopenia (66), given IVF in infusion for decreased fluid intake/tachycardia  -last radiation 4/3/23; last Carbo/Taxol 3/21/23  - consented for 1 year durvalumab 4/25/23 and received two doses  - CT scan concerning for pneumonitis in view of worsening SOB, prednisone initiated at 1mg/kg/day (90mg) as well as PPI/Bactrim prophylactically, completed steroid taper with resolution of symptoms.   - personally reviewed PET/CT with FDG-avidity  of LLL lesion and possible abdominal lymph node; will obtain biopsy of LLL lesion  - personally reviewed brain MRI without evidence of disease  - discussed results of biopsy of LLL nodule, with same histology and biomarkers. PD-L1 10%. Discussed likelihood that this is M1a progression. He remains uninterested in any further radiation and strongly would like to pursue immunotherapy. Discussed potential of recurrent pneumonitis. Discussed clinical trial option, which he is currently also not interested in.  - sending full NGS tempus on tissue   - discussed ipi/nivo and risks, particularly risk of recurrent pneumonitis. Consented 10/17/23 and C1D1 10/17/23  - will scan after C2, orders placed  - RTC 11/28/23 for follow-up and treatment, with repeat scans prior to visit with Dr. Blevins.     # G2 pneumonitis-irAE vs radiation pneumonitis- resolved  -prednisone initiated at 1mg/kg (90mg) and completed steroid taper     #Anxiety  -related to new suspected diagnosis of lung cancer  -Appreciate SW support     #Psoriasis  -Reports previously diagnosed by dermatology and was previously on Otezla when  rash was more diffuse  -Currently on topical steroid, reports minimal benefit. Small rash present on the elbow  - offered dermatology referral, and he deferred

## 2023-11-15 LAB — UH GNO TEMPUS PORTAL: NORMAL

## 2023-11-24 ENCOUNTER — HOSPITAL ENCOUNTER (OUTPATIENT)
Dept: RADIOLOGY | Facility: HOSPITAL | Age: 53
Discharge: HOME | End: 2023-11-24
Payer: COMMERCIAL

## 2023-11-24 DIAGNOSIS — C34.31 PRIMARY SQUAMOUS CELL CARCINOMA OF LOWER LOBE OF RIGHT LUNG (MULTI): ICD-10-CM

## 2023-11-24 PROCEDURE — 71260 CT THORAX DX C+: CPT

## 2023-11-24 PROCEDURE — 2550000001 HC RX 255 CONTRASTS: Performed by: NURSE PRACTITIONER

## 2023-11-24 RX ADMIN — IOHEXOL 75 ML: 350 INJECTION, SOLUTION INTRAVENOUS at 13:03

## 2023-11-28 ENCOUNTER — OFFICE VISIT (OUTPATIENT)
Dept: HEMATOLOGY/ONCOLOGY | Facility: HOSPITAL | Age: 53
End: 2023-11-28
Payer: COMMERCIAL

## 2023-11-28 ENCOUNTER — APPOINTMENT (OUTPATIENT)
Dept: HEMATOLOGY/ONCOLOGY | Facility: HOSPITAL | Age: 53
End: 2023-11-28
Payer: COMMERCIAL

## 2023-11-28 VITALS
WEIGHT: 220.5 LBS | HEART RATE: 101 BPM | HEIGHT: 68 IN | OXYGEN SATURATION: 98 % | SYSTOLIC BLOOD PRESSURE: 128 MMHG | RESPIRATION RATE: 18 BRPM | DIASTOLIC BLOOD PRESSURE: 82 MMHG | BODY MASS INDEX: 33.42 KG/M2 | TEMPERATURE: 97.3 F

## 2023-11-28 DIAGNOSIS — C34.31 PRIMARY SQUAMOUS CELL CARCINOMA OF LOWER LOBE OF RIGHT LUNG (MULTI): Primary | ICD-10-CM

## 2023-11-28 DIAGNOSIS — T50.905A DRUG-INDUCED PNEUMONITIS: ICD-10-CM

## 2023-11-28 DIAGNOSIS — J98.4 DRUG-INDUCED PNEUMONITIS: ICD-10-CM

## 2023-11-28 PROCEDURE — 3074F SYST BP LT 130 MM HG: CPT | Performed by: STUDENT IN AN ORGANIZED HEALTH CARE EDUCATION/TRAINING PROGRAM

## 2023-11-28 PROCEDURE — 99215 OFFICE O/P EST HI 40 MIN: CPT | Performed by: STUDENT IN AN ORGANIZED HEALTH CARE EDUCATION/TRAINING PROGRAM

## 2023-11-28 PROCEDURE — 3008F BODY MASS INDEX DOCD: CPT | Performed by: STUDENT IN AN ORGANIZED HEALTH CARE EDUCATION/TRAINING PROGRAM

## 2023-11-28 PROCEDURE — 3079F DIAST BP 80-89 MM HG: CPT | Performed by: STUDENT IN AN ORGANIZED HEALTH CARE EDUCATION/TRAINING PROGRAM

## 2023-11-28 PROCEDURE — 1036F TOBACCO NON-USER: CPT | Performed by: STUDENT IN AN ORGANIZED HEALTH CARE EDUCATION/TRAINING PROGRAM

## 2023-11-28 ASSESSMENT — PAIN SCALES - GENERAL: PAINLEVEL: 0-NO PAIN

## 2023-11-28 ASSESSMENT — ENCOUNTER SYMPTOMS: DEPRESSION: 0

## 2023-11-28 NOTE — PROGRESS NOTES
[facility] - Medical Oncology Follow-Up Visit    Patient ID: Torres Rivera is a 53 y.o. male      Current therapy: nivolumab (Opdivo) 360 mg in sodium chloride 0.9% 100 mL IV, 360 mg, intravenous, Once, 1 of 9 cycles    Administration: 360 mg (10/17/2023), 360 mg (11/7/2023)        ipilimumab (Yervoy) 100 mg in sodium chloride 0.9% 50 mL IV, 1 mg/kg = 100 mg, intravenous, Once, 1 of 9 cycles    Administration: 100 mg (10/17/2023)      Oncologic History:   Oncologic History:   DIAGNOSIS  NSCLC squamous cell ca     STAGING  T3N3M0, now with M1a progression      CURRENT SITES OF DISEASE  RLL, R hilar, subcarinal, supraclavicular, LLL     MOLECULAR GENOMICS  RLL:  PD-L1 <1%  PIK3CA amplification  TP53 splice site ([id number] c.673-1G>T)     LLL:  PD-L1 10%  PIK3CA amplification  TP53 splice site ([id number] c.673-1G>T)      PRIOR THERAPY  concurrent chemoradiation weekly carbo/taxol 2/21/23 - 4/3/23  durvalumab 4/25/23 - 5/9/23 (2 doses)     CURRENT THERAPY  Ipilimumab/nivolumab 10/17/23 1 cycle     CURRENT ONCOLOGICAL PROBLEMS           HISTORY OF PRESENT ILLNESS  Torres Rivera is a 51 yo M PMHx tobacco use disorder presenting for new visit for suspected lung cancer.      Pt presented to [facility] on 12/31/22 for acute chest pain. CT PE showed a mass like opacity in the RLL 5.9x4.3x2.5cm with subcarinal and R hilar adenopathy and postobstructive pneumonia. Mild interlobular septal thickening at the R lung base  was thought possibly due to lymphangitic carcinomatosis. He was prescribed antibiotics and prednisone. His chest pain resolved after a few days. He otherwise reports he had felt in good health. Denied new SOB, cough, weight loss, fatigue, change in appetite,  fever or chills. He has stable chronic SOB from smoking. He underwent bronchoscopy with biopsy on 1/11/23 at  with pathology revealing squamous cell ca of station 7, NGS without targetable mutation,   "insufficient tissue for PD-L1. PET/CT revealed FDG-avidity of R supclavicular node as well. Biopsy of the supraclavicular node positive for squamous cell ca. Offered concurrent chemoradiation with weekly carbo/taxol which he started on 2/21/23 and ended  4/3/23. He started durvalumab 4/25/23. Course c/b pneumonitis, and durvalumab held after 2 doses (5/2023). Treated with steroids with improvement. Restaging scans unfortunately with concern for new LLL nodule, and PET/CT 8/2023 with FDG avidity of new  lesion. Biopsy done 9/22/23, with pathology consistent with squamous cell ca, same PIK3CA and TP53 mutations. He is not interested in any further radiation and strongly prefers immunotherapy. Discussed risk of recurrent pneumonitis, which he understands. Plan to move forward with ipi/nivo which started 10/17/23. Unfortunately he had a scan after one cycle, with G1 asymptomatic pneumonitis, and possible progression. He agreed to docetaxel/ramucirumab as next line - is interested in clinical trial but cannot take off from work and is worried about finances.        PAST MEDICAL HISTORY  HTN  Tobacco use disorder   BPH  HLD  Osteoporosis   Psoriasis         SOCIAL HISTORY  Home: lives with his wife   Work:   Tobacco: quit 1/2023. 1PPD x 30 yrs   Alcohol: ~4 drinks/week  Drugs: none        CURRENT MEDS  Lisinopril 5  Lovastatin 10  Metoprolol succinate 100mg   Albuterol PRN         ALLERGIES  NKDA     FAMILY HISTORY   No family history of lung cancer      Chief Concern: Here in clinic for follow-up and treatment with nivolumab     HPI   He is here today with his wife. He feels \"perfect\" - dyspnea on exertion is stable, no cough.       Meds (Current):    Current Outpatient Medications:     lisinopril 5 mg tablet, Take 1 tablet (5 mg) by mouth once daily., Disp: 90 tablet, Rfl: 2    oxyCODONE (Oxy-IR) 5 mg immediate release capsule, 1 CAP(S) ORALLY 4 TIMES A DAY, AS NEEDED, Disp: , Rfl:     pantoprazole " "(ProtoNix) 20 mg EC tablet, , Disp: , Rfl:     predniSONE (Deltasone) 10 mg tablet, , Disp: , Rfl:     prochlorperazine (Compazine) 10 mg tablet, , Disp: , Rfl:     prochlorperazine (Compazine) 10 mg tablet, Take 1 tablet (10 mg) by mouth every 6 hours if needed for nausea or vomiting. (Patient not taking: Reported on 10/17/2023), Disp: 30 tablet, Rfl: 5    rosuvastatin (Crestor) 10 mg tablet, Take 1 tablet (10 mg) by mouth once daily., Disp: 30 tablet, Rfl: 5    sucralfate (Carafate) 1 gram tablet, , Disp: , Rfl:     sulfamethoxazole-trimethoprim (Bactrim DS) 800-160 mg tablet, , Disp: , Rfl:     Ventolin HFA 90 mcg/actuation inhaler, Inhale., Disp: , Rfl:     Review of Systems - Oncology 12 point ROS was obtained and negative unless otherwise mentioned in the above HPI.      Objective   BSA: 2.19 meters squared  Wt Readings from Last 5 Encounters:   11/28/23 100 kg (220 lb 8 oz)   11/07/23 101 kg (221 lb 12.8 oz)   10/17/23 100 kg (220 lb 14.4 oz)   10/17/23 99.9 kg (220 lb 3.8 oz)   09/26/23 98.4 kg (216 lb 14.9 oz)     /82 (BP Location: Left arm, Patient Position: Sitting, BP Cuff Size: Large adult)   Pulse 101   Temp 36.3 °C (97.3 °F) (Skin)   Resp 18   Ht (S) 1.724 m (5' 7.87\")   Wt 100 kg (220 lb 8 oz)   SpO2 98%   BMI 33.65 kg/m²     ECOG Score: 0- Fully active, able to carry on all pre-disease performance w/o restriction.      Physical Exam  Constitutional:       Appearance: Normal appearance.   Eyes:      Pupils: Pupils are equal, round, and reactive to light.   Cardiovascular:      Rate and Rhythm: Normal rate and regular rhythm.   Pulmonary:      Effort: Pulmonary effort is normal.      Breath sounds: Normal breath sounds.   Abdominal:      General: Bowel sounds are normal.      Palpations: Abdomen is soft.   Musculoskeletal:         General: Normal range of motion.   Skin:     General: Skin is warm and dry.   Neurological:      General: No focal deficit present.      Mental Status: He is " alert and oriented to person, place, and time.   Psychiatric:         Mood and Affect: Mood normal.         Behavior: Behavior normal.        Results:  Labs:  Lab Results   Component Value Date    WBC 5.8 11/07/2023    HGB 14.5 11/07/2023    HCT 42.8 11/07/2023    MCV 87 11/07/2023     11/07/2023      Lab Results   Component Value Date    NEUTROABS 3.56 11/07/2023      Lab Results   Component Value Date    GLUCOSE 108 (H) 11/07/2023    CALCIUM 9.7 11/07/2023     11/07/2023    K 4.1 11/07/2023    CO2 23 11/07/2023     11/07/2023    BUN 17 11/07/2023    CREATININE 0.64 11/07/2023     Lab Results   Component Value Date    ALT 28 11/07/2023    AST 20 11/07/2023    ALKPHOS 97 11/07/2023    BILITOT 0.4 11/07/2023      Lab Results   Component Value Date    CORTISOL 6.1 10/17/2023    TSH 2.05 11/07/2023       Imaging:           === Results for orders placed in visit on 09/21/23 ===    MR brain w and wo IV contrast [[id number]] 09/21/2023    Status: Normal  No evidence of intracranial metastatic disease was identified at this  time.    MACRO:  None    Assessment/Plan      Torres Rivera is a 53 y.o. male here for follow up     Torres Rivera is a 53 y.o. male with PMHx HTN, psoriasis, and tobacco use who presented with NSCLC squamous cell ca, no targetable mutations, neg PD-L1 who is s/p concurrent chemoradiation with weekly carbo/taxol then progression after holding durvalumab due to G2 pneumonitis. Starting ipi/nivo per patient preference.      #NSCLC squamous cell ca  - T3N3M0, Stage IIIC originally, now with M1a progression  - PD-L1 neg, no targetable mutations  - supraclavicular node biopsy-proven squamous cell ca  - met Dr. Lew and discussed concurrent chemoradiation  - he has concerns about radiation and treatment in general   - discussed if we move forward with curative-intent concurrent chemoradiation, would receive weekly carbo/taxol. discussed palliative treatment with combination  chemo-immunotherapy would be the non-radiation option, unfortunately surgery is not a viable  option.  - discussed potential side effects of carbo/taxol including but not limited to allergic reaction, nausea/vomiting, diarrhea/constipation, fatigue, injury to liver/kidney, risk of infection, anemia/thrombocytopenia. consented 2/7/23  - C1D1 concurrent chemoRT with weekly carbo/taxol 2/21/23  - discussed a year of durvalumab afterwards broadly  - 3/28/23 Carbo/Taxol held for thrombocytopenia (66), given IVF in infusion for decreased fluid intake/tachycardia  -last radiation 4/3/23; last Carbo/Taxol 3/21/23  - consented for 1 year durvalumab 4/25/23 and received two doses  - CT scan concerning for pneumonitis in view of worsening SOB, prednisone initiated at 1mg/kg/day (90mg) as well as PPI/Bactrim prophylactically, completed steroid taper with resolution of symptoms.   - personally reviewed PET/CT with FDG-avidity  of LLL lesion and possible abdominal lymph node; will obtain biopsy of LLL lesion  - personally reviewed brain MRI without evidence of disease  - discussed results of biopsy of LLL nodule, with same histology and biomarkers. PD-L1 10%. Discussed likelihood that this is M1a progression. He remains uninterested in any further radiation and strongly would like to pursue immunotherapy. Discussed potential of recurrent pneumonitis. Discussed clinical trial option, which he is currently also not interested in.  - sending full NGS tempus on tissue - unfortunately insufficient tissue from most recent biopsy and original biopsy  - liquid NGS without targetable mutation  - discussed ipi/nivo and risks, particularly risk of recurrent pneumonitis. Consented 10/17/23 and C1D1 10/17/23. Had unexpected restaging scan after C1, with G1 pneumonitis noted on scan. He is asymptomatic.   - discussed next line of therapy with either [facility] docetaxel/ramucirumab vs clinical trial. He currently does not have short term disability  insurance and is very concerned about being able to continue working while on clinical trial. Consented for docetaxel/ramucirumab 11/28/23, plan to start on 12/12/23     # G2 pneumonitis-irAE vs radiation pneumonitis- -> recurrent G1 pneumonitis  -asymptomatic, will monitor     #Anxiety  -related to new suspected diagnosis of lung cancer  -Appreciate SW support     #Psoriasis  -Reports previously diagnosed by dermatology and was previously on Otezla when rash was more diffuse  -Currently on topical steroid, reports minimal benefit. Small rash present on the elbow  - offered dermatology referral, and he deferred

## 2023-12-12 ENCOUNTER — OFFICE VISIT (OUTPATIENT)
Dept: HEMATOLOGY/ONCOLOGY | Facility: HOSPITAL | Age: 53
End: 2023-12-12
Payer: COMMERCIAL

## 2023-12-12 ENCOUNTER — OFFICE VISIT (OUTPATIENT)
Dept: PRIMARY CARE | Facility: CLINIC | Age: 53
End: 2023-12-12
Payer: COMMERCIAL

## 2023-12-12 ENCOUNTER — ONCOLOGY MEDICATION OUTREACH (OUTPATIENT)
Dept: HEMATOLOGY/ONCOLOGY | Facility: HOSPITAL | Age: 53
End: 2023-12-12

## 2023-12-12 ENCOUNTER — INFUSION (OUTPATIENT)
Dept: HEMATOLOGY/ONCOLOGY | Facility: HOSPITAL | Age: 53
End: 2023-12-12
Payer: COMMERCIAL

## 2023-12-12 VITALS
HEART RATE: 114 BPM | OXYGEN SATURATION: 95 % | DIASTOLIC BLOOD PRESSURE: 91 MMHG | SYSTOLIC BLOOD PRESSURE: 129 MMHG | TEMPERATURE: 97.5 F | BODY MASS INDEX: 34.94 KG/M2 | RESPIRATION RATE: 18 BRPM | WEIGHT: 223.11 LBS

## 2023-12-12 VITALS
HEIGHT: 67 IN | BODY MASS INDEX: 34.84 KG/M2 | DIASTOLIC BLOOD PRESSURE: 84 MMHG | SYSTOLIC BLOOD PRESSURE: 122 MMHG | WEIGHT: 222 LBS | TEMPERATURE: 96.9 F

## 2023-12-12 VITALS — OXYGEN SATURATION: 97 % | HEART RATE: 92 BPM | DIASTOLIC BLOOD PRESSURE: 94 MMHG | SYSTOLIC BLOOD PRESSURE: 133 MMHG

## 2023-12-12 DIAGNOSIS — C34.31 PRIMARY SQUAMOUS CELL CARCINOMA OF LOWER LOBE OF RIGHT LUNG (MULTI): Primary | ICD-10-CM

## 2023-12-12 DIAGNOSIS — C34.31 PRIMARY SQUAMOUS CELL CARCINOMA OF LOWER LOBE OF RIGHT LUNG (MULTI): ICD-10-CM

## 2023-12-12 DIAGNOSIS — J41.0 SMOKERS' COUGH (MULTI): ICD-10-CM

## 2023-12-12 DIAGNOSIS — I10 BENIGN ESSENTIAL HYPERTENSION: ICD-10-CM

## 2023-12-12 DIAGNOSIS — E78.00 PURE HYPERCHOLESTEROLEMIA: ICD-10-CM

## 2023-12-12 DIAGNOSIS — E78.00 PURE HYPERCHOLESTEROLEMIA: Primary | ICD-10-CM

## 2023-12-12 LAB
ALBUMIN SERPL BCP-MCNC: 3.9 G/DL (ref 3.4–5)
ALP SERPL-CCNC: 93 U/L (ref 33–120)
ALT SERPL W P-5'-P-CCNC: 33 U/L (ref 10–52)
ANION GAP SERPL CALC-SCNC: 12 MMOL/L (ref 10–20)
APPEARANCE UR: CLEAR
AST SERPL W P-5'-P-CCNC: 35 U/L (ref 9–39)
BASOPHILS # BLD AUTO: 0.06 X10*3/UL (ref 0–0.1)
BASOPHILS NFR BLD AUTO: 1.1 %
BILIRUB SERPL-MCNC: 0.5 MG/DL (ref 0–1.2)
BILIRUB UR STRIP.AUTO-MCNC: NEGATIVE MG/DL
BUN SERPL-MCNC: 14 MG/DL (ref 6–23)
CALCIUM SERPL-MCNC: 9.2 MG/DL (ref 8.6–10.3)
CHLORIDE SERPL-SCNC: 105 MMOL/L (ref 98–107)
CO2 SERPL-SCNC: 27 MMOL/L (ref 21–32)
COLOR UR: YELLOW
CREAT SERPL-MCNC: 0.94 MG/DL (ref 0.5–1.3)
EOSINOPHIL # BLD AUTO: 0.24 X10*3/UL (ref 0–0.7)
EOSINOPHIL NFR BLD AUTO: 4.3 %
ERYTHROCYTE [DISTWIDTH] IN BLOOD BY AUTOMATED COUNT: 14.4 % (ref 11.5–14.5)
GFR SERPL CREATININE-BSD FRML MDRD: >90 ML/MIN/1.73M*2
GLUCOSE SERPL-MCNC: 93 MG/DL (ref 74–99)
GLUCOSE UR STRIP.AUTO-MCNC: NEGATIVE MG/DL
HCT VFR BLD AUTO: 43.7 % (ref 41–52)
HGB BLD-MCNC: 14.9 G/DL (ref 13.5–17.5)
IMM GRANULOCYTES # BLD AUTO: 0.06 X10*3/UL (ref 0–0.7)
IMM GRANULOCYTES NFR BLD AUTO: 1.1 % (ref 0–0.9)
KETONES UR STRIP.AUTO-MCNC: NEGATIVE MG/DL
LEUKOCYTE ESTERASE UR QL STRIP.AUTO: NEGATIVE
LYMPHOCYTES # BLD AUTO: 0.98 X10*3/UL (ref 1.2–4.8)
LYMPHOCYTES NFR BLD AUTO: 17.4 %
MCH RBC QN AUTO: 29.2 PG (ref 26–34)
MCHC RBC AUTO-ENTMCNC: 34.1 G/DL (ref 32–36)
MCV RBC AUTO: 86 FL (ref 80–100)
MONOCYTES # BLD AUTO: 0.71 X10*3/UL (ref 0.1–1)
MONOCYTES NFR BLD AUTO: 12.6 %
NEUTROPHILS # BLD AUTO: 3.58 X10*3/UL (ref 1.2–7.7)
NEUTROPHILS NFR BLD AUTO: 63.5 %
NITRITE UR QL STRIP.AUTO: NEGATIVE
NRBC BLD-RTO: 0 /100 WBCS (ref 0–0)
PH UR STRIP.AUTO: 6 [PH]
PLATELET # BLD AUTO: 247 X10*3/UL (ref 150–450)
POTASSIUM SERPL-SCNC: 5.1 MMOL/L (ref 3.5–5.3)
PROT SERPL-MCNC: 6.6 G/DL (ref 6.4–8.2)
PROT UR STRIP.AUTO-MCNC: NEGATIVE MG/DL
RBC # BLD AUTO: 5.1 X10*6/UL (ref 4.5–5.9)
RBC # UR STRIP.AUTO: NEGATIVE /UL
SODIUM SERPL-SCNC: 139 MMOL/L (ref 136–145)
SP GR UR STRIP.AUTO: 1.02
UROBILINOGEN UR STRIP.AUTO-MCNC: <2 MG/DL
WBC # BLD AUTO: 5.6 X10*3/UL (ref 4.4–11.3)

## 2023-12-12 PROCEDURE — 99214 OFFICE O/P EST MOD 30 MIN: CPT | Performed by: STUDENT IN AN ORGANIZED HEALTH CARE EDUCATION/TRAINING PROGRAM

## 2023-12-12 PROCEDURE — 2500000004 HC RX 250 GENERAL PHARMACY W/ HCPCS (ALT 636 FOR OP/ED): Performed by: STUDENT IN AN ORGANIZED HEALTH CARE EDUCATION/TRAINING PROGRAM

## 2023-12-12 PROCEDURE — 96375 TX/PRO/DX INJ NEW DRUG ADDON: CPT | Mod: INF

## 2023-12-12 PROCEDURE — 3074F SYST BP LT 130 MM HG: CPT | Performed by: STUDENT IN AN ORGANIZED HEALTH CARE EDUCATION/TRAINING PROGRAM

## 2023-12-12 PROCEDURE — 1036F TOBACCO NON-USER: CPT | Performed by: PHYSICIAN ASSISTANT

## 2023-12-12 PROCEDURE — 80053 COMPREHEN METABOLIC PANEL: CPT

## 2023-12-12 PROCEDURE — 96413 CHEMO IV INFUSION 1 HR: CPT

## 2023-12-12 PROCEDURE — 80061 LIPID PANEL: CPT | Performed by: PHYSICIAN ASSISTANT

## 2023-12-12 PROCEDURE — 3008F BODY MASS INDEX DOCD: CPT | Performed by: PHYSICIAN ASSISTANT

## 2023-12-12 PROCEDURE — 2500000001 HC RX 250 WO HCPCS SELF ADMINISTERED DRUGS (ALT 637 FOR MEDICARE OP): Performed by: STUDENT IN AN ORGANIZED HEALTH CARE EDUCATION/TRAINING PROGRAM

## 2023-12-12 PROCEDURE — 96417 CHEMO IV INFUS EACH ADDL SEQ: CPT

## 2023-12-12 PROCEDURE — 3079F DIAST BP 80-89 MM HG: CPT | Performed by: PHYSICIAN ASSISTANT

## 2023-12-12 PROCEDURE — 81003 URINALYSIS AUTO W/O SCOPE: CPT

## 2023-12-12 PROCEDURE — 85025 COMPLETE CBC W/AUTO DIFF WBC: CPT

## 2023-12-12 PROCEDURE — 36415 COLL VENOUS BLD VENIPUNCTURE: CPT

## 2023-12-12 PROCEDURE — 3008F BODY MASS INDEX DOCD: CPT | Performed by: STUDENT IN AN ORGANIZED HEALTH CARE EDUCATION/TRAINING PROGRAM

## 2023-12-12 PROCEDURE — 1036F TOBACCO NON-USER: CPT | Performed by: STUDENT IN AN ORGANIZED HEALTH CARE EDUCATION/TRAINING PROGRAM

## 2023-12-12 PROCEDURE — 3080F DIAST BP >= 90 MM HG: CPT | Performed by: STUDENT IN AN ORGANIZED HEALTH CARE EDUCATION/TRAINING PROGRAM

## 2023-12-12 PROCEDURE — 3074F SYST BP LT 130 MM HG: CPT | Performed by: PHYSICIAN ASSISTANT

## 2023-12-12 PROCEDURE — 99214 OFFICE O/P EST MOD 30 MIN: CPT | Performed by: PHYSICIAN ASSISTANT

## 2023-12-12 RX ORDER — ALBUTEROL SULFATE 0.83 MG/ML
3 SOLUTION RESPIRATORY (INHALATION) AS NEEDED
Status: DISCONTINUED | OUTPATIENT
Start: 2023-12-12 | End: 2023-12-12 | Stop reason: HOSPADM

## 2023-12-12 RX ORDER — PROCHLORPERAZINE MALEATE 10 MG
10 TABLET ORAL EVERY 6 HOURS PRN
Qty: 30 TABLET | Refills: 5 | Status: SHIPPED | OUTPATIENT
Start: 2023-12-12

## 2023-12-12 RX ORDER — DIPHENHYDRAMINE HYDROCHLORIDE 50 MG/ML
50 INJECTION INTRAMUSCULAR; INTRAVENOUS AS NEEDED
Status: DISCONTINUED | OUTPATIENT
Start: 2023-12-12 | End: 2023-12-12 | Stop reason: HOSPADM

## 2023-12-12 RX ORDER — ONDANSETRON HYDROCHLORIDE 8 MG/1
8 TABLET, FILM COATED ORAL EVERY 8 HOURS PRN
Qty: 30 TABLET | Refills: 5 | Status: SHIPPED | OUTPATIENT
Start: 2023-12-12

## 2023-12-12 RX ORDER — FAMOTIDINE 10 MG/ML
20 INJECTION INTRAVENOUS ONCE AS NEEDED
Status: CANCELLED | OUTPATIENT
Start: 2023-12-12

## 2023-12-12 RX ORDER — PROCHLORPERAZINE EDISYLATE 5 MG/ML
10 INJECTION INTRAMUSCULAR; INTRAVENOUS EVERY 6 HOURS PRN
Status: CANCELLED | OUTPATIENT
Start: 2023-12-12

## 2023-12-12 RX ORDER — ROSUVASTATIN CALCIUM 10 MG/1
10 TABLET, COATED ORAL DAILY
Qty: 90 TABLET | Refills: 3 | Status: SHIPPED | OUTPATIENT
Start: 2023-12-12 | End: 2024-12-06

## 2023-12-12 RX ORDER — DIPHENHYDRAMINE HCL 50 MG
50 CAPSULE ORAL ONCE
Status: COMPLETED | OUTPATIENT
Start: 2023-12-12 | End: 2023-12-12

## 2023-12-12 RX ORDER — ALBUTEROL SULFATE 0.83 MG/ML
3 SOLUTION RESPIRATORY (INHALATION) AS NEEDED
Status: CANCELLED | OUTPATIENT
Start: 2023-12-12

## 2023-12-12 RX ORDER — PROCHLORPERAZINE MALEATE 10 MG
10 TABLET ORAL EVERY 6 HOURS PRN
Status: DISCONTINUED | OUTPATIENT
Start: 2023-12-12 | End: 2023-12-12 | Stop reason: HOSPADM

## 2023-12-12 RX ORDER — FAMOTIDINE 10 MG/ML
20 INJECTION INTRAVENOUS ONCE AS NEEDED
Status: DISCONTINUED | OUTPATIENT
Start: 2023-12-12 | End: 2023-12-12 | Stop reason: HOSPADM

## 2023-12-12 RX ORDER — DEXAMETHASONE SODIUM PHOSPHATE 4 MG/ML
8 INJECTION, SOLUTION INTRA-ARTICULAR; INTRALESIONAL; INTRAMUSCULAR; INTRAVENOUS; SOFT TISSUE ONCE
Status: CANCELLED | OUTPATIENT
Start: 2023-12-12

## 2023-12-12 RX ORDER — PROCHLORPERAZINE EDISYLATE 5 MG/ML
10 INJECTION INTRAMUSCULAR; INTRAVENOUS EVERY 6 HOURS PRN
Status: DISCONTINUED | OUTPATIENT
Start: 2023-12-12 | End: 2023-12-12 | Stop reason: HOSPADM

## 2023-12-12 RX ORDER — DEXAMETHASONE SODIUM PHOSPHATE 4 MG/ML
8 INJECTION, SOLUTION INTRA-ARTICULAR; INTRALESIONAL; INTRAMUSCULAR; INTRAVENOUS; SOFT TISSUE ONCE
Status: COMPLETED | OUTPATIENT
Start: 2023-12-12 | End: 2023-12-12

## 2023-12-12 RX ORDER — PROCHLORPERAZINE MALEATE 10 MG
10 TABLET ORAL EVERY 6 HOURS PRN
Status: CANCELLED | OUTPATIENT
Start: 2023-12-12

## 2023-12-12 RX ORDER — EPINEPHRINE 0.3 MG/.3ML
0.3 INJECTION SUBCUTANEOUS EVERY 5 MIN PRN
Status: CANCELLED | OUTPATIENT
Start: 2023-12-12

## 2023-12-12 RX ORDER — EPINEPHRINE 0.3 MG/.3ML
0.3 INJECTION SUBCUTANEOUS EVERY 5 MIN PRN
Status: DISCONTINUED | OUTPATIENT
Start: 2023-12-12 | End: 2023-12-12 | Stop reason: HOSPADM

## 2023-12-12 RX ORDER — DEXAMETHASONE 4 MG/1
TABLET ORAL
Qty: 12 TABLET | Refills: 3 | Status: SHIPPED
Start: 2023-12-12 | End: 2024-02-13 | Stop reason: ALTCHOICE

## 2023-12-12 RX ORDER — DIPHENHYDRAMINE HCL 50 MG
50 CAPSULE ORAL ONCE
Status: CANCELLED | OUTPATIENT
Start: 2023-12-12

## 2023-12-12 RX ORDER — DIPHENHYDRAMINE HYDROCHLORIDE 50 MG/ML
50 INJECTION INTRAMUSCULAR; INTRAVENOUS AS NEEDED
Status: CANCELLED | OUTPATIENT
Start: 2023-12-12

## 2023-12-12 RX ADMIN — DEXAMETHASONE SODIUM PHOSPHATE 8 MG: 4 INJECTION, SOLUTION INTRA-ARTICULAR; INTRALESIONAL; INTRAMUSCULAR; INTRAVENOUS; SOFT TISSUE at 14:35

## 2023-12-12 RX ADMIN — DOCETAXEL 160 MG: 20 INJECTION, SOLUTION, CONCENTRATE INTRAVENOUS at 16:46

## 2023-12-12 RX ADMIN — SODIUM CHLORIDE 1000 MG: 9 INJECTION, SOLUTION INTRAVENOUS at 15:28

## 2023-12-12 RX ADMIN — DIPHENHYDRAMINE HYDROCHLORIDE 50 MG: 50 CAPSULE ORAL at 14:35

## 2023-12-12 ASSESSMENT — ENCOUNTER SYMPTOMS
HEMATURIA: 0
CHOKING: 0
FREQUENCY: 0
WHEEZING: 0
MYALGIAS: 0
CONFUSION: 0
NERVOUS/ANXIOUS: 0
HEADACHES: 0
ABDOMINAL DISTENTION: 0
ARTHRALGIAS: 0
TREMORS: 0
PALPITATIONS: 0
DIARRHEA: 0
POLYDIPSIA: 0
APPETITE CHANGE: 0
FATIGUE: 0
EYE PAIN: 0
DIFFICULTY URINATING: 0
CHILLS: 0
POLYPHAGIA: 0
EYE DISCHARGE: 0
FEVER: 0
FACIAL SWELLING: 0
NUMBNESS: 0
SLEEP DISTURBANCE: 0
CONSTIPATION: 0
SHORTNESS OF BREATH: 0
DIZZINESS: 0
COLOR CHANGE: 0
NAUSEA: 0
DEPRESSION: 0
COUGH: 0
CHEST TIGHTNESS: 0
WEAKNESS: 0
JOINT SWELLING: 0
OCCASIONAL FEELINGS OF UNSTEADINESS: 0
LOSS OF SENSATION IN FEET: 0
SORE THROAT: 0
ANAL BLEEDING: 0
VOMITING: 0
ABDOMINAL PAIN: 0

## 2023-12-12 ASSESSMENT — COLUMBIA-SUICIDE SEVERITY RATING SCALE - C-SSRS
1. IN THE PAST MONTH, HAVE YOU WISHED YOU WERE DEAD OR WISHED YOU COULD GO TO SLEEP AND NOT WAKE UP?: NO
6. HAVE YOU EVER DONE ANYTHING, STARTED TO DO ANYTHING, OR PREPARED TO DO ANYTHING TO END YOUR LIFE?: NO
2. HAVE YOU ACTUALLY HAD ANY THOUGHTS OF KILLING YOURSELF?: NO

## 2023-12-12 ASSESSMENT — PATIENT HEALTH QUESTIONNAIRE - PHQ9
2. FEELING DOWN, DEPRESSED OR HOPELESS: NOT AT ALL
SUM OF ALL RESPONSES TO PHQ9 QUESTIONS 1 AND 2: 0
1. LITTLE INTEREST OR PLEASURE IN DOING THINGS: NOT AT ALL

## 2023-12-12 ASSESSMENT — PAIN SCALES - GENERAL: PAINLEVEL: 0-NO PAIN

## 2023-12-12 NOTE — PROGRESS NOTES
1730 Care taken over from Carey Edmonds RN.    1801 Patient tolerated without incident. PIV removed at completion and patient discharged ambulatory with self.

## 2023-12-12 NOTE — PROGRESS NOTES
[facility] - Medical Oncology Follow-Up Visit    Patient ID: Torres Rivera is a 53 y.o. male      Current therapy: nivolumab (Opdivo) 360 mg in sodium chloride 0.9% 100 mL IV, 360 mg, intravenous, Once, 1 of 9 cycles    Administration: 360 mg (10/17/2023), 360 mg (11/7/2023)        ipilimumab (Yervoy) 100 mg in sodium chloride 0.9% 50 mL IV, 1 mg/kg = 100 mg, intravenous, Once, 1 of 9 cycles    Administration: 100 mg (10/17/2023)    DOCEtaxeL (Taxotere) 160 mg in dextrose 5 % in water (D5W) 258 mL IV, 75 mg/m2, intravenous, Once, 0 of 17 cycles        ramucirumab (Cyramza) 10 mg/kg = 1,000 mg in sodium chloride 0.9% 250 mL IV, 10 mg/kg, intravenous, Once, 0 of 17 cycles      Oncologic History:   Oncologic History:   DIAGNOSIS  NSCLC squamous cell ca     STAGING  T3N3M0, now with M1a progression      CURRENT SITES OF DISEASE  RLL, R hilar, subcarinal, supraclavicular, LLL     MOLECULAR GENOMICS  RLL:  PD-L1 <1%  PIK3CA amplification  TP53 splice site ([id number].[id number])     LLL:  PD-L1 10%  PIK3CA amplification  TP53 splice site ([id number].[id number]>T)      PRIOR THERAPY  concurrent chemoradiation weekly carbo/taxol 2/21/23 - 4/3/23  durvalumab 4/25/23 - 5/9/23 (2 doses)     CURRENT THERAPY  Ipilimumab/nivolumab 10/17/23 1 cycle     CURRENT ONCOLOGICAL PROBLEMS           HISTORY OF PRESENT ILLNESS  Torres Rivera is a 53 yo M PMHx tobacco use disorder presenting for new visit for suspected lung cancer.      Pt presented to [facility] on 12/31/22 for acute chest pain. CT PE showed a mass like opacity in the RLL 5.9x4.3x2.5cm with subcarinal and R hilar adenopathy and postobstructive pneumonia. Mild interlobular septal thickening at the R lung base  was thought possibly due to lymphangitic carcinomatosis. He was prescribed antibiotics and prednisone. His chest pain resolved after a few days. He otherwise reports he had felt in good health. Denied new SOB, cough, weight  loss, fatigue, change in appetite,  fever or chills. He has stable chronic SOB from smoking. He underwent bronchoscopy with biopsy on 1/11/23 at  with pathology revealing squamous cell ca of station 7, NGS without targetable mutation,  insufficient tissue for PD-L1. PET/CT revealed FDG-avidity of R supclavicular node as well. Biopsy of the supraclavicular node positive for squamous cell ca. Offered concurrent chemoradiation with weekly carbo/taxol which he started on 2/21/23 and ended  4/3/23. He started durvalumab 4/25/23. Course c/b pneumonitis, and durvalumab held after 2 doses (5/2023). Treated with steroids with improvement. Restaging scans unfortunately with concern for new LLL nodule, and PET/CT 8/2023 with FDG avidity of new  lesion. Biopsy done 9/22/23, with pathology consistent with squamous cell ca, same PIK3CA and TP53 mutations. He is not interested in any further radiation and strongly prefers immunotherapy. Discussed risk of recurrent pneumonitis, which he understands. Plan to move forward with ipi/nivo which started 10/17/23. Unfortunately he had a scan after one cycle, with G1 asymptomatic pneumonitis, and possible progression. He agreed to docetaxel/ramucirumab as next line - is interested in clinical trial but cannot take off from work and is worried about finances.        PAST MEDICAL HISTORY  HTN  Tobacco use disorder   BPH  HLD  Osteoporosis   Psoriasis         SOCIAL HISTORY  Home: lives with his wife   Work:   Tobacco: quit 1/2023. 1PPD x 30 yrs   Alcohol: ~4 drinks/week  Drugs: none        CURRENT MEDS  Lisinopril 5  Lovastatin 10  Metoprolol succinate 100mg   Albuterol PRN         ALLERGIES  NKDA     FAMILY HISTORY   No family history of lung cancer      Chief Concern: Here in clinic for follow-up and treatment with nivolumab     HPI   He is here today with his wife. He feels well and has no complaints.      Meds (Current):    Current Outpatient Medications:     rosuvastatin  (Crestor) 10 mg tablet, Take 1 tablet (10 mg) by mouth once daily., Disp: 30 tablet, Rfl: 5    Ventolin HFA 90 mcg/actuation inhaler, Inhale., Disp: , Rfl:     lisinopril 5 mg tablet, Take 1 tablet (5 mg) by mouth once daily., Disp: 90 tablet, Rfl: 2    Review of Systems - Oncology 12 point ROS was obtained and negative unless otherwise mentioned in the above HPI.      Objective   BSA: 2.19 meters squared  Wt Readings from Last 5 Encounters:   12/12/23 101 kg (223 lb 1.7 oz)   12/12/23 101 kg (222 lb)   11/28/23 100 kg (220 lb 8 oz)   11/07/23 101 kg (221 lb 12.8 oz)   10/17/23 100 kg (220 lb 14.4 oz)     BP (!) 129/91 (BP Location: Left arm, Patient Position: Sitting)   Pulse (!) 114   Temp 36.4 °C (97.5 °F)   Resp 18   Wt 101 kg (223 lb 1.7 oz)   SpO2 95%   BMI 34.94 kg/m²     ECOG Score: 0- Fully active, able to carry on all pre-disease performance w/o restriction.      Physical Exam  Constitutional:       Appearance: Normal appearance.   Eyes:      Pupils: Pupils are equal, round, and reactive to light.   Cardiovascular:      Rate and Rhythm: Normal rate and regular rhythm.   Pulmonary:      Effort: Pulmonary effort is normal.      Breath sounds: Normal breath sounds.   Abdominal:      General: Bowel sounds are normal.      Palpations: Abdomen is soft.   Musculoskeletal:         General: Normal range of motion.   Skin:     General: Skin is warm and dry.   Neurological:      General: No focal deficit present.      Mental Status: He is alert and oriented to person, place, and time.   Psychiatric:         Mood and Affect: Mood normal.         Behavior: Behavior normal.          Results:  Labs:  Lab Results   Component Value Date    WBC 5.8 11/07/2023    HGB 14.5 11/07/2023    HCT 42.8 11/07/2023    MCV 87 11/07/2023     11/07/2023      Lab Results   Component Value Date    NEUTROABS 3.56 11/07/2023      Lab Results   Component Value Date    GLUCOSE 108 (H) 11/07/2023    CALCIUM 9.7 11/07/2023      11/07/2023    K 4.1 11/07/2023    CO2 23 11/07/2023     11/07/2023    BUN 17 11/07/2023    CREATININE 0.64 11/07/2023     Lab Results   Component Value Date    ALT 28 11/07/2023    AST 20 11/07/2023    ALKPHOS 97 11/07/2023    BILITOT 0.4 11/07/2023      Lab Results   Component Value Date    CORTISOL 6.1 10/17/2023    TSH 2.05 11/07/2023       Imaging:           === Results for orders placed in visit on 09/21/23 ===    MR brain w and wo IV contrast [[id number]] 09/21/2023    Status: Normal  No evidence of intracranial metastatic disease was identified at this  time.    MACRO:  None    Assessment/Plan      Torres Rivera is a 53 y.o. male here for follow up     Torres Rivera is a 53 y.o. male with PMHx HTN, psoriasis, and tobacco use who presented with NSCLC squamous cell ca, no targetable mutations, neg PD-L1 who is s/p concurrent chemoradiation with weekly carbo/taxol then progression after holding durvalumab due to G2 pneumonitis. Starting ipi/nivo per patient preference.      #NSCLC squamous cell ca  - T3N3M0, Stage IIIC originally, now with M1a progression  - PD-L1 neg, no targetable mutations  - supraclavicular node biopsy-proven squamous cell ca  - met Dr. Lew and discussed concurrent chemoradiation  - he has concerns about radiation and treatment in general   - discussed if we move forward with curative-intent concurrent chemoradiation, would receive weekly carbo/taxol. discussed palliative treatment with combination chemo-immunotherapy would be the non-radiation option, unfortunately surgery is not a viable  option.  - discussed potential side effects of carbo/taxol including but not limited to allergic reaction, nausea/vomiting, diarrhea/constipation, fatigue, injury to liver/kidney, risk of infection, anemia/thrombocytopenia. consented 2/7/23  - C1D1 concurrent chemoRT with weekly carbo/taxol 2/21/23  - discussed a year of durvalumab afterwards broadly  - 3/28/23 Carbo/Taxol held for  thrombocytopenia (66), given IVF in infusion for decreased fluid intake/tachycardia  -last radiation 4/3/23; last Carbo/Taxol 3/21/23  - consented for 1 year durvalumab 4/25/23 and received two doses  - CT scan concerning for pneumonitis in view of worsening SOB, prednisone initiated at 1mg/kg/day (90mg) as well as PPI/Bactrim prophylactically, completed steroid taper with resolution of symptoms.   - personally reviewed PET/CT with FDG-avidity  of LLL lesion and possible abdominal lymph node; will obtain biopsy of LLL lesion  - personally reviewed brain MRI without evidence of disease  - discussed results of biopsy of LLL nodule, with same histology and biomarkers. PD-L1 10%. Discussed likelihood that this is M1a progression. He remains uninterested in any further radiation and strongly would like to pursue immunotherapy. Discussed potential of recurrent pneumonitis. Discussed clinical trial option, which he is currently also not interested in.  - sending full NGS tempus on tissue - unfortunately insufficient tissue from most recent biopsy and original biopsy  - liquid NGS without targetable mutation  - discussed ipi/nivo and risks, particularly risk of recurrent pneumonitis. Consented 10/17/23 and C1D1 10/17/23. Had unexpected restaging scan after C1, with G1 pneumonitis noted on scan. He is asymptomatic.   - discussed next line of therapy with either SoC docetaxel/ramucirumab vs clinical trial. He currently does not have short term disability insurance and is very concerned about being able to continue working while on clinical trial. Consented for docetaxel/ramucirumab 11/28/23  - starting docetaxel/ramucirumab today, RTC 1 week for toxicity check with labs     # G2 pneumonitis-irAE vs radiation pneumonitis- -> recurrent G1 pneumonitis  -asymptomatic, will monitor     #Anxiety  -related to new suspected diagnosis of lung cancer  -Appreciate  support     #Psoriasis  -Reports previously diagnosed by dermatology  and was previously on Otezla when rash was more diffuse  -Currently on topical steroid, reports minimal benefit. Small rash present on the elbow  - offered dermatology referral, and he deferred

## 2023-12-12 NOTE — PROGRESS NOTES
ONCOLOGY CLINICAL PHARMACY NOTE     Subjective  Torres Rivera is a 53 y.o. male with recurrent squamous cell lung carcinoma (originally stage IIIA now with M1a progression), here for education.    Patient presents to clinic today with his spouse.     Objective  There were no vitals taken for this visit.  Lab Results   Component Value Date    WBC 5.6 12/12/2023    HGB 14.9 12/12/2023    HCT 43.7 12/12/2023    MCV 86 12/12/2023     12/12/2023      Lab Results   Component Value Date    GLUCOSE 108 (H) 11/07/2023    CALCIUM 9.7 11/07/2023     11/07/2023    K 4.1 11/07/2023    CO2 23 11/07/2023     11/07/2023    BUN 17 11/07/2023    CREATININE 0.64 11/07/2023     Lab Results   Component Value Date    ALT 28 11/07/2023    AST 20 11/07/2023    ALKPHOS 97 11/07/2023    BILITOT 0.4 11/07/2023       Allergies  No Known Allergies    Medications  Were medications reconciled this encounter: No     Current Outpatient Medications:     dexAMETHasone (Decadron) 4 mg tablet, Take 4 mg (1 tablet) twice daily for 3 days starting the day BEFORE treatment., Disp: 12 tablet, Rfl: 3    lisinopril 5 mg tablet, Take 1 tablet (5 mg) by mouth once daily., Disp: 90 tablet, Rfl: 2    ondansetron (Zofran) 8 mg tablet, Take 1 tablet (8 mg) by mouth every 8 hours if needed for nausea or vomiting., Disp: 30 tablet, Rfl: 5    prochlorperazine (Compazine) 10 mg tablet, Take 1 tablet (10 mg) by mouth every 6 hours if needed for nausea or vomiting., Disp: 30 tablet, Rfl: 5    rosuvastatin (Crestor) 10 mg tablet, Take 1 tablet (10 mg) by mouth once daily., Disp: 30 tablet, Rfl: 5    Ventolin HFA 90 mcg/actuation inhaler, Inhale., Disp: , Rfl:     Assessment and Plan  Torres Rivera is a 53 y.o. male with recurrent squamous cell carcinoma, to be treated with docetaxel/ramucirumab.    Chemotherapy:  Today is C1D1 of therapy  Drug interactions identified: None  Dose adjustments (renal/hepatic, toxicities): None currently (per 11/7  CMP)  Patient was counseled on the following:   Docetaxel  Administration schedule: 75 mg/m2 on Day 1 of every cycle (21 days)  Side effects counseled on: decreased cell counts, infusion reactions (dizziness/hypotension, fever/chills, urticaria, flushing, swelling, back pain), full body alopecia, neuropathy, edema  Dexamethasone released script to home pharmacy ([facility]) - spent majority of visit discussing benefit/risk of prophylactic steroid (8 mg BID for 3 days starting the day before treatment) in preventing fluid retention/infusion reactions. Patient voiced concerns about prior weight gain on steroids, ultimately opted to trial 4 mg BID (instead of 8 mg BID) starting today after treatment  Will give IV dex as pre-medication (8 mg) today instead of Zofran for nausea prevention since patient has not filled dex script yet/did not get any steroid prior to today's treatment  Will re-assess patient after today's dose re: dex dose and if tolerating, can continue 4 mg BID x3 days (starting day before treatment) with subsequent cycles  Ramucirumab  Administration schedule: 10 mg/kg on Day 1 of every cycle (21 days)  Side effects counseled on: increased blood pressure, peripheral edema, nausea, fatigue, effects on wound healing (instructed patient to notify team regarding any upcoming procedures), risk of bleeding  Benadryl PO given prior to each treatment to prevent reactions    Supportive Care:  CINV:  Pre-meds day of treatment: Zofran 8 mg (dex 8 mg today)  Prescriptions released from [facility] to patient's home pharmacy: Zofran prn (first line), Compazine prn (second line)  Growth Factor Support: none    Instructed patient to alert team and go to Emergency Department for fevers of at least 100.4 degrees F, or other changes in clinical status. All questions were answered and my contact information was provided to patient. Toxicity check scheduled for next Tuesday, 12/19, with VIC Arcos.    Kaylah Tay,  RPh  Clinical Pharmacist - Thoracic

## 2023-12-12 NOTE — PROGRESS NOTES
"Subjective   Patient ID: Torres Rivera is a 53 y.o. male with a history of stage IIIc NSCLC right lower lobe lung cancer, hypertension, dyslipidemia, BPH, osteoporosis, is who presents for Follow-up (5 months).    HPI The patient is presented for follow-up.  He does not voice any minor or major complaints.  Stated he is going to start new chemotherapy since his tumor increased in size.  He is off of prednisone.  His blood pressure is well-controlled with lisinopril 5 mg only.  Of note, he self stopped metoprolol due to erectile dysfunction.  Stated he has erection had not much improved.    Review of Systems   Constitutional:  Negative for appetite change, chills, fatigue and fever.   HENT:  Negative for congestion, ear pain, facial swelling, hearing loss, nosebleeds and sore throat.    Eyes:  Negative for pain, discharge and visual disturbance.   Respiratory:  Negative for cough, choking, chest tightness, shortness of breath and wheezing.    Cardiovascular:  Negative for chest pain, palpitations and leg swelling.   Gastrointestinal:  Negative for abdominal distention, abdominal pain, anal bleeding, constipation, diarrhea, nausea and vomiting.   Endocrine: Negative for cold intolerance, heat intolerance, polydipsia, polyphagia and polyuria.   Genitourinary:  Negative for difficulty urinating, frequency, hematuria and urgency.   Musculoskeletal:  Negative for arthralgias, gait problem, joint swelling and myalgias.   Skin:  Negative for color change and rash.   Neurological:  Negative for dizziness, tremors, syncope, weakness, numbness and headaches.   Psychiatric/Behavioral:  Negative for behavioral problems, confusion, sleep disturbance and suicidal ideas. The patient is not nervous/anxious.        Objective   /84   Temp 36.1 °C (96.9 °F)   Ht 1.702 m (5' 7\")   Wt 101 kg (222 lb)   BMI 34.77 kg/m²     Physical Exam  Constitutional:       General: He is not in acute distress.     Appearance: Normal " appearance.   HENT:      Head: Normocephalic and atraumatic.      Nose: Nose normal.   Eyes:      Extraocular Movements: Extraocular movements intact.      Conjunctiva/sclera: Conjunctivae normal.      Pupils: Pupils are equal, round, and reactive to light.   Cardiovascular:      Rate and Rhythm: Normal rate and regular rhythm.      Pulses: Normal pulses.      Heart sounds: Normal heart sounds.   Pulmonary:      Effort: Pulmonary effort is normal.      Breath sounds: Normal breath sounds.   Abdominal:      General: Bowel sounds are normal.      Palpations: Abdomen is soft.   Musculoskeletal:         General: Normal range of motion.      Cervical back: Normal range of motion and neck supple.   Neurological:      General: No focal deficit present.      Mental Status: He is alert and oriented to person, place, and time.   Psychiatric:         Mood and Affect: Mood normal.         Behavior: Behavior normal.         Thought Content: Thought content normal.         Judgment: Judgment normal.         Assessment/Plan   Stage IIIC NSCLC lung cancer  S/p bronchoscopy 1/11/2023  CT scan 11/07/23 demonstrated increase in size and consolidation within the right lower lobe and increase in size of the solid left lower lobe nodule which now demonstrates cavitation and multiple new bilateral rounded pulmonary nodules are identified which are also suspicious for progression of disease.   Failed durvalumab due pneumonitis   Off of prednisone   Starting chemotherapy this week  Follow-up with oncology every 3 weeks, has an appointment today    High blood pressure  Well-controlled  Self stopped Metoprolol  mg due to erectile dysfunction  Continue lisinopril 5 mg daily  No added salt diet, do not add salt to your food  Try to exercise every other day for 30 minutes  Stress test done in [facility] September 2022, normal     Dyslipidemia  Continue with the low fat, low cholesterol diet  I recommend Mediterranean diet, which include fish,  chicken, vegetables and olive oil  Exercise daily for 30 minutes at least 3 times a week  Continue Lovastatin 10 mg at bed time  We obtained lipid panel today     BMI 33.33 kg/m²  Elevated BMI, ideal BMI is between 23-26 kg/m2  Low fat, low cholesterol diet, low carbohydrate diet  Exercise at least 30 minutes daily     Health maintenance  Flu vaccine declined  Cologuard 9/27/2021 negative repeat in 3 years    We obtained lipid panel today  I will call with results

## 2023-12-12 NOTE — PROGRESS NOTES
ONCOLOGY CLINICAL PHARMACY NOTE     Subjective  Torres Rivera is a 53 y.o. male with recurrent squamous cell lung carcinoma (originally stage IIIA now with M1a progression), here for education.    Patient presents to clinic today with his spouse.     Objective  There were no vitals taken for this visit.  Lab Results   Component Value Date    WBC 5.8 11/07/2023    HGB 14.5 11/07/2023    HCT 42.8 11/07/2023    MCV 87 11/07/2023     11/07/2023      Lab Results   Component Value Date    GLUCOSE 108 (H) 11/07/2023    CALCIUM 9.7 11/07/2023     11/07/2023    K 4.1 11/07/2023    CO2 23 11/07/2023     11/07/2023    BUN 17 11/07/2023    CREATININE 0.64 11/07/2023     Lab Results   Component Value Date    ALT 28 11/07/2023    AST 20 11/07/2023    ALKPHOS 97 11/07/2023    BILITOT 0.4 11/07/2023       Allergies  No Known Allergies    Medications  Were medications reconciled this encounter: No     Current Outpatient Medications:     dexAMETHasone (Decadron) 4 mg tablet, Take 4 mg (1 tablet) twice daily for 3 days starting the day BEFORE treatment., Disp: 12 tablet, Rfl: 3    lisinopril 5 mg tablet, Take 1 tablet (5 mg) by mouth once daily., Disp: 90 tablet, Rfl: 2    ondansetron (Zofran) 8 mg tablet, Take 1 tablet (8 mg) by mouth every 8 hours if needed for nausea or vomiting., Disp: 30 tablet, Rfl: 5    prochlorperazine (Compazine) 10 mg tablet, Take 1 tablet (10 mg) by mouth every 6 hours if needed for nausea or vomiting., Disp: 30 tablet, Rfl: 5    rosuvastatin (Crestor) 10 mg tablet, Take 1 tablet (10 mg) by mouth once daily., Disp: 30 tablet, Rfl: 5    Ventolin HFA 90 mcg/actuation inhaler, Inhale., Disp: , Rfl:     Assessment and Plan  Torres Rivera is a 53 y.o. male with recurrent squamous cell carcinoma, to be treated with docetaxel/ramucirumab.    Chemotherapy:  Today is C1D1 of therapy  Drug interactions identified: None  Dose adjustments (renal/hepatic, toxicities): None currently (per 11/7  CMP)  Patient was counseled on the following:   Docetaxel  Administration schedule: 75 mg/m2 on Day 1 of every cycle (21 days)  Side effects counseled on: decreased cell counts, infusion reactions (dizziness/hypotension, fever/chills, urticaria, flushing, swelling, back pain), full body alopecia, neuropathy, edema  Dexamethasone released script to home pharmacy ([facility]) - spent majority of visit discussing benefit/risk of prophylactic steroid (8 mg BID for 3 days starting the day before treatment) in preventing fluid retention/infusion reactions. Patient voiced concerns about prior weight gain on steroids, ultimately opted to trial 4 mg BID (instead of 8 mg BID) starting today after treatment  Will give IV dex as pre-medication (8 mg) today instead of Zofran for nausea prevention since patient has not filled dex script yet/did not get any steroid prior to today's treatment  Will re-assess patient after today's dose re: dex dose and if tolerating, can continue 4 mg BID x3 days (starting day before treatment) with subsequent cycles  Ramucirumab  Administration schedule: 10 mg/kg on Day 1 of every cycle (21 days)  Side effects counseled on: increased blood pressure, peripheral edema, nausea, fatigue, effects on wound healing (instructed patient to notify team regarding any upcoming procedures), risk of bleeding  Benadryl PO given prior to each treatment to prevent reactions    Supportive Care:  CINV:  Pre-meds day of treatment: Zofran 8 mg (dex 8 mg today)  Prescriptions released from [facility] to patient's home pharmacy: Zofran prn (first line), Compazine prn (second line)  Growth Factor Support: none    Instructed patient to alert team and go to Emergency Department for fevers of at least 100.4 degrees F, or other changes in clinical status. All questions were answered and my contact information was provided to patient. Toxicity check scheduled for next Tuesday, 12/19, with VIC Arcos.    Kaylah Tay,  RPh  Clinical Pharmacist - Thoracic

## 2023-12-19 ENCOUNTER — APPOINTMENT (OUTPATIENT)
Dept: HEMATOLOGY/ONCOLOGY | Facility: HOSPITAL | Age: 53
End: 2023-12-19
Payer: COMMERCIAL

## 2023-12-19 ENCOUNTER — OFFICE VISIT (OUTPATIENT)
Dept: HEMATOLOGY/ONCOLOGY | Facility: HOSPITAL | Age: 53
End: 2023-12-19
Payer: COMMERCIAL

## 2023-12-19 VITALS
OXYGEN SATURATION: 96 % | BODY MASS INDEX: 34.44 KG/M2 | RESPIRATION RATE: 17 BRPM | WEIGHT: 219.9 LBS | SYSTOLIC BLOOD PRESSURE: 150 MMHG | HEART RATE: 108 BPM | DIASTOLIC BLOOD PRESSURE: 97 MMHG | TEMPERATURE: 96.6 F

## 2023-12-19 DIAGNOSIS — C34.31 PRIMARY SQUAMOUS CELL CARCINOMA OF LOWER LOBE OF RIGHT LUNG (MULTI): Primary | ICD-10-CM

## 2023-12-19 DIAGNOSIS — Z51.12 ENCOUNTER FOR ANTINEOPLASTIC CHEMOTHERAPY AND IMMUNOTHERAPY: ICD-10-CM

## 2023-12-19 DIAGNOSIS — Z51.11 ENCOUNTER FOR ANTINEOPLASTIC CHEMOTHERAPY AND IMMUNOTHERAPY: ICD-10-CM

## 2023-12-19 PROCEDURE — 3079F DIAST BP 80-89 MM HG: CPT | Performed by: NURSE PRACTITIONER

## 2023-12-19 PROCEDURE — 3008F BODY MASS INDEX DOCD: CPT | Performed by: NURSE PRACTITIONER

## 2023-12-19 PROCEDURE — 99214 OFFICE O/P EST MOD 30 MIN: CPT | Performed by: NURSE PRACTITIONER

## 2023-12-19 PROCEDURE — 3074F SYST BP LT 130 MM HG: CPT | Performed by: NURSE PRACTITIONER

## 2023-12-19 PROCEDURE — 1036F TOBACCO NON-USER: CPT | Performed by: NURSE PRACTITIONER

## 2023-12-19 ASSESSMENT — PAIN SCALES - GENERAL: PAINLEVEL: 0-NO PAIN

## 2023-12-19 NOTE — PROGRESS NOTES
[facility] - Medical Oncology Follow-Up Visit    Patient ID: Torres Rivera is a 53 y.o. male      Current therapy: nivolumab (Opdivo) 360 mg in sodium chloride 0.9% 100 mL IV, 360 mg, intravenous, Once, 1 of 9 cycles    Administration: 360 mg (10/17/2023), 360 mg (11/7/2023)        ipilimumab (Yervoy) 100 mg in sodium chloride 0.9% 50 mL IV, 1 mg/kg = 100 mg, intravenous, Once, 1 of 9 cycles    Administration: 100 mg (10/17/2023)    DOCEtaxeL (Taxotere) 160 mg in dextrose 5 % in water (D5W) 258 mL IV, 75 mg/m2 = 160 mg, intravenous, Once, 1 of 17 cycles    Administration: 160 mg (12/12/2023)        ramucirumab (Cyramza) 10 mg/kg = 1,000 mg in sodium chloride 0.9% 250 mL IV, 10 mg/kg = 1,000 mg, intravenous, Once, 1 of 17 cycles    Administration: 1,000 mg (12/12/2023)      Oncologic History:   Oncologic History:   DIAGNOSIS  NSCLC squamous cell ca     STAGING  T3N3M0, now with M1a progression      CURRENT SITES OF DISEASE  RLL, R hilar, subcarinal, supraclavicular, LLL     MOLECULAR GENOMICS  RLL:  PD-L1 <1%  PIK3CA amplification  TP53 splice site (NM_[id number] c.673-1G>T)     LLL:  PD-L1 10%  PIK3CA amplification  TP53 splice site ([id number] c.673-1G>T)      PRIOR THERAPY  concurrent chemoradiation weekly carbo/taxol 2/21/23 - 4/3/23  durvalumab 4/25/23 - 5/9/23 (2 doses)     CURRENT THERAPY  Ipilimumab/nivolumab 10/17/23 1 cycle     CURRENT ONCOLOGICAL PROBLEMS           HISTORY OF PRESENT ILLNESS  Torres Rivera is a 53 yo M PMHx tobacco use disorder presenting for new visit for suspected lung cancer.      Pt presented to [facility] on 12/31/22 for acute chest pain. CT PE showed a mass like opacity in the RLL 5.9x4.3x2.5cm with subcarinal and R hilar adenopathy and postobstructive pneumonia. Mild interlobular septal thickening at the R lung base  was thought possibly due to lymphangitic carcinomatosis. He was prescribed antibiotics and prednisone. His chest pain  resolved after a few days. He otherwise reports he had felt in good health. Denied new SOB, cough, weight loss, fatigue, change in appetite,  fever or chills. He has stable chronic SOB from smoking. He underwent bronchoscopy with biopsy on 1/11/23 at  with pathology revealing squamous cell ca of station 7, NGS without targetable mutation,  insufficient tissue for PD-L1. PET/CT revealed FDG-avidity of R supclavicular node as well. Biopsy of the supraclavicular node positive for squamous cell ca. Offered concurrent chemoradiation with weekly carbo/taxol which he started on 2/21/23 and ended  4/3/23. He started durvalumab 4/25/23. Course c/b pneumonitis, and durvalumab held after 2 doses (5/2023). Treated with steroids with improvement. Restaging scans unfortunately with concern for new LLL nodule, and PET/CT 8/2023 with FDG avidity of new  lesion. Biopsy done 9/22/23, with pathology consistent with squamous cell ca, same PIK3CA and TP53 mutations. He is not interested in any further radiation and strongly prefers immunotherapy. Discussed risk of recurrent pneumonitis, which he understands. Plan to move forward with ipi/nivo which started 10/17/23. Unfortunately he had a scan after one cycle, with G1 asymptomatic pneumonitis, and possible progression. He agreed to docetaxel/ramucirumab as next line - is interested in clinical trial but cannot take off from work and is worried about finances.        PAST MEDICAL HISTORY  HTN  Tobacco use disorder   BPH  HLD  Osteoporosis   Psoriasis         SOCIAL HISTORY  Home: lives with his wife   Work:   Tobacco: quit 1/2023. 1PPD x 30 yrs   Alcohol: ~4 drinks/week  Drugs: none        CURRENT MEDS  Lisinopril 5  Lovastatin 10  Metoprolol succinate 100mg   Albuterol PRN         ALLERGIES  NKDA     FAMILY HISTORY   No family history of lung cancer      Chief Concern: Here in clinic for follow-up and toxicity check s/p C1 Docetaxel/Ramucirumab      HPI   Patient is here  today for follow-up and toxicity check s/p C1. He tolerated C1 well with the exception of mild chills without fever, noted at night, did not last long. Breathing stable, no CP or SOB. Endorses mild, occasional non-productive cough. Denies cold symptoms. No GI symptoms. No other concerns or complaints.      Meds (Current):    Current Outpatient Medications:     dexAMETHasone (Decadron) 4 mg tablet, Take 4 mg (1 tablet) twice daily for 3 days starting the day BEFORE treatment., Disp: 12 tablet, Rfl: 3    ondansetron (Zofran) 8 mg tablet, Take 1 tablet (8 mg) by mouth every 8 hours if needed for nausea or vomiting., Disp: 30 tablet, Rfl: 5    prochlorperazine (Compazine) 10 mg tablet, Take 1 tablet (10 mg) by mouth every 6 hours if needed for nausea or vomiting., Disp: 30 tablet, Rfl: 5    rosuvastatin (Crestor) 10 mg tablet, Take 1 tablet (10 mg) by mouth once daily., Disp: 90 tablet, Rfl: 3    Ventolin HFA 90 mcg/actuation inhaler, Inhale., Disp: , Rfl:     lisinopril 5 mg tablet, Take 1 tablet (5 mg) by mouth once daily., Disp: 90 tablet, Rfl: 2    Review of Systems - Oncology 12 point ROS was obtained and negative unless otherwise mentioned in the above HPI.      Objective   BSA: 2.17 meters squared  Wt Readings from Last 5 Encounters:   12/19/23 99.7 kg (219 lb 14.4 oz)   12/12/23 101 kg (223 lb 1.7 oz)   12/12/23 101 kg (222 lb)   11/28/23 100 kg (220 lb 8 oz)   11/07/23 101 kg (221 lb 12.8 oz)     BP (!) 150/97   Pulse 108   Temp 35.9 °C (96.6 °F) (Core)   Resp 17   Wt 99.7 kg (219 lb 14.4 oz)   SpO2 96%   BMI 34.44 kg/m²     ECOG Score: 0- Fully active, able to carry on all pre-disease performance w/o restriction.      Physical Exam  Constitutional:       Appearance: Normal appearance.   Eyes:      Pupils: Pupils are equal, round, and reactive to light.   Cardiovascular:      Rate and Rhythm: Normal rate and regular rhythm.   Pulmonary:      Effort: Pulmonary effort is normal.      Breath sounds: Normal  breath sounds.   Abdominal:      General: Bowel sounds are normal.      Palpations: Abdomen is soft.   Musculoskeletal:         General: Normal range of motion.   Skin:     General: Skin is warm and dry.   Neurological:      General: No focal deficit present.      Mental Status: He is alert and oriented to person, place, and time.   Psychiatric:         Mood and Affect: Mood normal.         Behavior: Behavior normal.          Results:  Labs:  Lab Results   Component Value Date    WBC 5.6 12/12/2023    HGB 14.9 12/12/2023    HCT 43.7 12/12/2023    MCV 86 12/12/2023     12/12/2023      Lab Results   Component Value Date    NEUTROABS 3.58 12/12/2023      Lab Results   Component Value Date    GLUCOSE 93 12/12/2023    CALCIUM 9.2 12/12/2023     12/12/2023    K 5.1 12/12/2023    CO2 27 12/12/2023     12/12/2023    BUN 14 12/12/2023    CREATININE 0.94 12/12/2023     Lab Results   Component Value Date    ALT 33 12/12/2023    AST 35 12/12/2023    ALKPHOS 93 12/12/2023    BILITOT 0.5 12/12/2023      Lab Results   Component Value Date    CORTISOL 6.1 10/17/2023    TSH 2.05 11/07/2023       Imaging:           === Results for orders placed in visit on 09/21/23 ===    MR brain w and wo IV contrast [[id number]] 09/21/2023    Status: Normal  No evidence of intracranial metastatic disease was identified at this  time.    MACRO:  None    Assessment/Plan      Torres Rivera is a 53 y.o. male here for follow up     Torres Rivera is a 53 y.o. male with PMHx HTN, psoriasis, and tobacco use who presented with NSCLC squamous cell ca, no targetable mutations, neg PD-L1 who is s/p concurrent chemoradiation with weekly carbo/taxol then progression after holding durvalumab due to G2 pneumonitis. Starting ipi/nivo per patient preference.      #NSCLC squamous cell ca  - T3N3M0, Stage IIIC originally, now with M1a progression  - PD-L1 neg, no targetable mutations  - supraclavicular node biopsy-proven squamous cell ca  - met  Dr. Lew and discussed concurrent chemoradiation  - he has concerns about radiation and treatment in general   - discussed if we move forward with curative-intent concurrent chemoradiation, would receive weekly carbo/taxol. discussed palliative treatment with combination chemo-immunotherapy would be the non-radiation option, unfortunately surgery is not a viable  option.  - discussed potential side effects of carbo/taxol including but not limited to allergic reaction, nausea/vomiting, diarrhea/constipation, fatigue, injury to liver/kidney, risk of infection, anemia/thrombocytopenia. consented 2/7/23  - C1D1 concurrent chemoRT with weekly carbo/taxol 2/21/23  - discussed a year of durvalumab afterwards broadly  - 3/28/23 Carbo/Taxol held for thrombocytopenia (66), given IVF in infusion for decreased fluid intake/tachycardia  -last radiation 4/3/23; last Carbo/Taxol 3/21/23  - consented for 1 year durvalumab 4/25/23 and received two doses  - CT scan concerning for pneumonitis in view of worsening SOB, prednisone initiated at 1mg/kg/day (90mg) as well as PPI/Bactrim prophylactically, completed steroid taper with resolution of symptoms.   - personally reviewed PET/CT with FDG-avidity  of LLL lesion and possible abdominal lymph node; will obtain biopsy of LLL lesion  - personally reviewed brain MRI without evidence of disease  - discussed results of biopsy of LLL nodule, with same histology and biomarkers. PD-L1 10%. Discussed likelihood that this is M1a progression. He remains uninterested in any further radiation and strongly would like to pursue immunotherapy. Discussed potential of recurrent pneumonitis. Discussed clinical trial option, which he is currently also not interested in.  - sending full NGS tempus on tissue - unfortunately insufficient tissue from most recent biopsy and original biopsy  - liquid NGS without targetable mutation  - discussed ipi/nivo and risks, particularly risk of recurrent pneumonitis.  Consented 10/17/23 and C1D1 10/17/23. Had unexpected restaging scan after C1, with G1 pneumonitis noted on scan. He is asymptomatic.   - discussed next line of therapy with either SoC docetaxel/ramucirumab vs clinical trial. He currently does not have short term disability insurance and is very concerned about being able to continue working while on clinical trial. Consented for docetaxel/ramucirumab 11/28/23  - started docetaxel/ramucirumab 12/12/23  -feeling well overall, tolerated C1 well, RTC in 2 weeks for follow-up, labs and treatment (C2)     # G2 pneumonitis-irAE vs radiation pneumonitis- -> recurrent G1 pneumonitis  -asymptomatic, will monitor     #Anxiety  -related to new suspected diagnosis of lung cancer  -Appreciate SW support     #Psoriasis  -Reports previously diagnosed by dermatology and was previously on Otezla when rash was more diffuse  -Currently on topical steroid, reports minimal benefit. Small rash present on the elbow  - offered dermatology referral, and he deferred

## 2024-01-02 ENCOUNTER — APPOINTMENT (OUTPATIENT)
Dept: HEMATOLOGY/ONCOLOGY | Facility: HOSPITAL | Age: 54
End: 2024-01-02
Payer: COMMERCIAL

## 2024-01-02 ENCOUNTER — LAB (OUTPATIENT)
Dept: LAB | Facility: HOSPITAL | Age: 54
End: 2024-01-02
Payer: COMMERCIAL

## 2024-01-02 ENCOUNTER — OFFICE VISIT (OUTPATIENT)
Dept: HEMATOLOGY/ONCOLOGY | Facility: HOSPITAL | Age: 54
End: 2024-01-02
Payer: COMMERCIAL

## 2024-01-02 ENCOUNTER — INFUSION (OUTPATIENT)
Dept: HEMATOLOGY/ONCOLOGY | Facility: HOSPITAL | Age: 54
End: 2024-01-02
Payer: COMMERCIAL

## 2024-01-02 VITALS
SYSTOLIC BLOOD PRESSURE: 133 MMHG | DIASTOLIC BLOOD PRESSURE: 89 MMHG | RESPIRATION RATE: 16 BRPM | OXYGEN SATURATION: 99 % | HEART RATE: 99 BPM

## 2024-01-02 VITALS
TEMPERATURE: 97.5 F | RESPIRATION RATE: 18 BRPM | BODY MASS INDEX: 34.6 KG/M2 | SYSTOLIC BLOOD PRESSURE: 152 MMHG | WEIGHT: 220.9 LBS | DIASTOLIC BLOOD PRESSURE: 98 MMHG | HEART RATE: 111 BPM | OXYGEN SATURATION: 98 %

## 2024-01-02 DIAGNOSIS — C34.31 PRIMARY SQUAMOUS CELL CARCINOMA OF LOWER LOBE OF RIGHT LUNG (MULTI): ICD-10-CM

## 2024-01-02 DIAGNOSIS — C34.31 PRIMARY SQUAMOUS CELL CARCINOMA OF LOWER LOBE OF RIGHT LUNG (MULTI): Primary | ICD-10-CM

## 2024-01-02 LAB
ALBUMIN SERPL BCP-MCNC: 4.2 G/DL (ref 3.4–5)
ALP SERPL-CCNC: 85 U/L (ref 33–120)
ALT SERPL W P-5'-P-CCNC: 33 U/L (ref 10–52)
ANION GAP SERPL CALC-SCNC: 15 MMOL/L (ref 10–20)
APPEARANCE UR: CLEAR
AST SERPL W P-5'-P-CCNC: 19 U/L (ref 9–39)
BASOPHILS # BLD AUTO: 0.03 X10*3/UL (ref 0–0.1)
BASOPHILS NFR BLD AUTO: 0.2 %
BILIRUB SERPL-MCNC: 0.4 MG/DL (ref 0–1.2)
BILIRUB UR STRIP.AUTO-MCNC: NEGATIVE MG/DL
BUN SERPL-MCNC: 20 MG/DL (ref 6–23)
CALCIUM SERPL-MCNC: 9.4 MG/DL (ref 8.6–10.3)
CHLORIDE SERPL-SCNC: 104 MMOL/L (ref 98–107)
CO2 SERPL-SCNC: 25 MMOL/L (ref 21–32)
COLOR UR: YELLOW
CREAT SERPL-MCNC: 0.79 MG/DL (ref 0.5–1.3)
EOSINOPHIL # BLD AUTO: 0 X10*3/UL (ref 0–0.7)
EOSINOPHIL NFR BLD AUTO: 0 %
ERYTHROCYTE [DISTWIDTH] IN BLOOD BY AUTOMATED COUNT: 15.1 % (ref 11.5–14.5)
GFR SERPL CREATININE-BSD FRML MDRD: >90 ML/MIN/1.73M*2
GLUCOSE SERPL-MCNC: 130 MG/DL (ref 74–99)
GLUCOSE UR STRIP.AUTO-MCNC: NEGATIVE MG/DL
HCT VFR BLD AUTO: 44.8 % (ref 41–52)
HGB BLD-MCNC: 15.3 G/DL (ref 13.5–17.5)
HYALINE CASTS #/AREA URNS AUTO: ABNORMAL /LPF
IMM GRANULOCYTES # BLD AUTO: 0.19 X10*3/UL (ref 0–0.7)
IMM GRANULOCYTES NFR BLD AUTO: 1.2 % (ref 0–0.9)
KETONES UR STRIP.AUTO-MCNC: ABNORMAL MG/DL
LEUKOCYTE ESTERASE UR QL STRIP.AUTO: NEGATIVE
LYMPHOCYTES # BLD AUTO: 1.18 X10*3/UL (ref 1.2–4.8)
LYMPHOCYTES NFR BLD AUTO: 7.4 %
MCH RBC QN AUTO: 28.4 PG (ref 26–34)
MCHC RBC AUTO-ENTMCNC: 34.2 G/DL (ref 32–36)
MCV RBC AUTO: 83 FL (ref 80–100)
MONOCYTES # BLD AUTO: 0.64 X10*3/UL (ref 0.1–1)
MONOCYTES NFR BLD AUTO: 4 %
MUCOUS THREADS #/AREA URNS AUTO: ABNORMAL /LPF
NEUTROPHILS # BLD AUTO: 13.87 X10*3/UL (ref 1.2–7.7)
NEUTROPHILS NFR BLD AUTO: 87.2 %
NITRITE UR QL STRIP.AUTO: NEGATIVE
NRBC BLD-RTO: 0 /100 WBCS (ref 0–0)
PH UR STRIP.AUTO: 5 [PH]
PLATELET # BLD AUTO: 310 X10*3/UL (ref 150–450)
POTASSIUM SERPL-SCNC: 4.7 MMOL/L (ref 3.5–5.3)
PROT SERPL-MCNC: 6.8 G/DL (ref 6.4–8.2)
PROT UR STRIP.AUTO-MCNC: ABNORMAL MG/DL
RBC # BLD AUTO: 5.38 X10*6/UL (ref 4.5–5.9)
RBC # UR STRIP.AUTO: NEGATIVE /UL
RBC #/AREA URNS AUTO: ABNORMAL /HPF
SODIUM SERPL-SCNC: 139 MMOL/L (ref 136–145)
SP GR UR STRIP.AUTO: 1.02
UROBILINOGEN UR STRIP.AUTO-MCNC: <2 MG/DL
WBC # BLD AUTO: 15.9 X10*3/UL (ref 4.4–11.3)
WBC #/AREA URNS AUTO: ABNORMAL /HPF

## 2024-01-02 PROCEDURE — 96413 CHEMO IV INFUSION 1 HR: CPT

## 2024-01-02 PROCEDURE — 99214 OFFICE O/P EST MOD 30 MIN: CPT | Mod: GC | Performed by: STUDENT IN AN ORGANIZED HEALTH CARE EDUCATION/TRAINING PROGRAM

## 2024-01-02 PROCEDURE — 85025 COMPLETE CBC W/AUTO DIFF WBC: CPT

## 2024-01-02 PROCEDURE — 96417 CHEMO IV INFUS EACH ADDL SEQ: CPT

## 2024-01-02 PROCEDURE — 80053 COMPREHEN METABOLIC PANEL: CPT

## 2024-01-02 PROCEDURE — 81001 URINALYSIS AUTO W/SCOPE: CPT

## 2024-01-02 PROCEDURE — 3077F SYST BP >= 140 MM HG: CPT | Performed by: STUDENT IN AN ORGANIZED HEALTH CARE EDUCATION/TRAINING PROGRAM

## 2024-01-02 PROCEDURE — 99214 OFFICE O/P EST MOD 30 MIN: CPT | Performed by: STUDENT IN AN ORGANIZED HEALTH CARE EDUCATION/TRAINING PROGRAM

## 2024-01-02 PROCEDURE — 2500000004 HC RX 250 GENERAL PHARMACY W/ HCPCS (ALT 636 FOR OP/ED): Performed by: STUDENT IN AN ORGANIZED HEALTH CARE EDUCATION/TRAINING PROGRAM

## 2024-01-02 PROCEDURE — 3080F DIAST BP >= 90 MM HG: CPT | Performed by: STUDENT IN AN ORGANIZED HEALTH CARE EDUCATION/TRAINING PROGRAM

## 2024-01-02 PROCEDURE — 2500000001 HC RX 250 WO HCPCS SELF ADMINISTERED DRUGS (ALT 637 FOR MEDICARE OP): Performed by: STUDENT IN AN ORGANIZED HEALTH CARE EDUCATION/TRAINING PROGRAM

## 2024-01-02 PROCEDURE — 2500000004 HC RX 250 GENERAL PHARMACY W/ HCPCS (ALT 636 FOR OP/ED): Mod: JZ | Performed by: STUDENT IN AN ORGANIZED HEALTH CARE EDUCATION/TRAINING PROGRAM

## 2024-01-02 PROCEDURE — 3008F BODY MASS INDEX DOCD: CPT | Performed by: STUDENT IN AN ORGANIZED HEALTH CARE EDUCATION/TRAINING PROGRAM

## 2024-01-02 PROCEDURE — 36415 COLL VENOUS BLD VENIPUNCTURE: CPT

## 2024-01-02 PROCEDURE — 96375 TX/PRO/DX INJ NEW DRUG ADDON: CPT | Mod: INF

## 2024-01-02 PROCEDURE — 1036F TOBACCO NON-USER: CPT | Performed by: STUDENT IN AN ORGANIZED HEALTH CARE EDUCATION/TRAINING PROGRAM

## 2024-01-02 RX ORDER — EPINEPHRINE 0.3 MG/.3ML
0.3 INJECTION SUBCUTANEOUS EVERY 5 MIN PRN
Status: CANCELLED | OUTPATIENT
Start: 2024-01-02

## 2024-01-02 RX ORDER — PROCHLORPERAZINE MALEATE 10 MG
10 TABLET ORAL EVERY 6 HOURS PRN
Status: CANCELLED | OUTPATIENT
Start: 2024-01-02

## 2024-01-02 RX ORDER — PROCHLORPERAZINE MALEATE 10 MG
10 TABLET ORAL EVERY 6 HOURS PRN
Status: DISCONTINUED | OUTPATIENT
Start: 2024-01-02 | End: 2024-01-02 | Stop reason: HOSPADM

## 2024-01-02 RX ORDER — ALBUTEROL SULFATE 0.83 MG/ML
3 SOLUTION RESPIRATORY (INHALATION) AS NEEDED
Status: CANCELLED | OUTPATIENT
Start: 2024-01-02

## 2024-01-02 RX ORDER — HEPARIN SODIUM,PORCINE/PF 10 UNIT/ML
50 SYRINGE (ML) INTRAVENOUS AS NEEDED
Status: CANCELLED | OUTPATIENT
Start: 2024-01-02

## 2024-01-02 RX ORDER — EPINEPHRINE 0.3 MG/.3ML
0.3 INJECTION SUBCUTANEOUS EVERY 5 MIN PRN
Status: DISCONTINUED | OUTPATIENT
Start: 2024-01-02 | End: 2024-01-02 | Stop reason: HOSPADM

## 2024-01-02 RX ORDER — HEPARIN SODIUM 1000 [USP'U]/ML
2000 INJECTION, SOLUTION INTRAVENOUS; SUBCUTANEOUS AS NEEDED
Status: CANCELLED | OUTPATIENT
Start: 2024-01-02

## 2024-01-02 RX ORDER — DIPHENHYDRAMINE HYDROCHLORIDE 50 MG/ML
50 INJECTION INTRAMUSCULAR; INTRAVENOUS AS NEEDED
Status: DISCONTINUED | OUTPATIENT
Start: 2024-01-02 | End: 2024-01-02 | Stop reason: HOSPADM

## 2024-01-02 RX ORDER — DIPHENHYDRAMINE HCL 50 MG
50 CAPSULE ORAL ONCE
Status: CANCELLED | OUTPATIENT
Start: 2024-01-02

## 2024-01-02 RX ORDER — HEPARIN 100 UNIT/ML
500 SYRINGE INTRAVENOUS AS NEEDED
Status: CANCELLED | OUTPATIENT
Start: 2024-01-02

## 2024-01-02 RX ORDER — PROCHLORPERAZINE EDISYLATE 5 MG/ML
10 INJECTION INTRAMUSCULAR; INTRAVENOUS EVERY 6 HOURS PRN
Status: DISCONTINUED | OUTPATIENT
Start: 2024-01-02 | End: 2024-01-02 | Stop reason: HOSPADM

## 2024-01-02 RX ORDER — DIPHENHYDRAMINE HCL 50 MG
50 CAPSULE ORAL ONCE
Status: COMPLETED | OUTPATIENT
Start: 2024-01-02 | End: 2024-01-02

## 2024-01-02 RX ORDER — ONDANSETRON HYDROCHLORIDE 2 MG/ML
8 INJECTION, SOLUTION INTRAVENOUS ONCE
Status: COMPLETED | OUTPATIENT
Start: 2024-01-02 | End: 2024-01-02

## 2024-01-02 RX ORDER — ALBUTEROL SULFATE 0.83 MG/ML
3 SOLUTION RESPIRATORY (INHALATION) AS NEEDED
Status: DISCONTINUED | OUTPATIENT
Start: 2024-01-02 | End: 2024-01-02 | Stop reason: HOSPADM

## 2024-01-02 RX ORDER — DIPHENHYDRAMINE HYDROCHLORIDE 50 MG/ML
50 INJECTION INTRAMUSCULAR; INTRAVENOUS AS NEEDED
Status: CANCELLED | OUTPATIENT
Start: 2024-01-02

## 2024-01-02 RX ORDER — PROCHLORPERAZINE EDISYLATE 5 MG/ML
10 INJECTION INTRAMUSCULAR; INTRAVENOUS EVERY 6 HOURS PRN
Status: CANCELLED | OUTPATIENT
Start: 2024-01-02

## 2024-01-02 RX ORDER — ONDANSETRON HYDROCHLORIDE 2 MG/ML
8 INJECTION, SOLUTION INTRAVENOUS ONCE
Status: CANCELLED | OUTPATIENT
Start: 2024-01-02

## 2024-01-02 RX ORDER — FAMOTIDINE 10 MG/ML
20 INJECTION INTRAVENOUS ONCE AS NEEDED
Status: DISCONTINUED | OUTPATIENT
Start: 2024-01-02 | End: 2024-01-02 | Stop reason: HOSPADM

## 2024-01-02 RX ORDER — FAMOTIDINE 10 MG/ML
20 INJECTION INTRAVENOUS ONCE AS NEEDED
Status: CANCELLED | OUTPATIENT
Start: 2024-01-02

## 2024-01-02 RX ADMIN — DOCETAXEL 160 MG: 20 INJECTION, SOLUTION, CONCENTRATE INTRAVENOUS at 13:21

## 2024-01-02 RX ADMIN — ONDANSETRON 8 MG: 2 INJECTION INTRAMUSCULAR; INTRAVENOUS at 11:19

## 2024-01-02 RX ADMIN — DIPHENHYDRAMINE HYDROCHLORIDE 50 MG: 50 CAPSULE ORAL at 11:19

## 2024-01-02 RX ADMIN — SODIUM CHLORIDE 1000 MG: 9 INJECTION, SOLUTION INTRAVENOUS at 11:49

## 2024-01-02 ASSESSMENT — ENCOUNTER SYMPTOMS
DEPRESSION: 0
LOSS OF SENSATION IN FEET: 0
OCCASIONAL FEELINGS OF UNSTEADINESS: 0

## 2024-01-02 ASSESSMENT — COLUMBIA-SUICIDE SEVERITY RATING SCALE - C-SSRS
2. HAVE YOU ACTUALLY HAD ANY THOUGHTS OF KILLING YOURSELF?: NO
1. IN THE PAST MONTH, HAVE YOU WISHED YOU WERE DEAD OR WISHED YOU COULD GO TO SLEEP AND NOT WAKE UP?: NO
6. HAVE YOU EVER DONE ANYTHING, STARTED TO DO ANYTHING, OR PREPARED TO DO ANYTHING TO END YOUR LIFE?: NO

## 2024-01-02 ASSESSMENT — PAIN SCALES - GENERAL: PAINLEVEL: 0-NO PAIN

## 2024-01-02 NOTE — PROGRESS NOTES
[facility] - Medical Oncology Follow-Up Visit     Patient ID: Torres Rivera is a 53 y.o. male      Current therapy:     DOCEtaxeL (Taxotere) 160 mg in dextrose 5 % in water (D5W) 258 mL IV, 75 mg/m2, intravenous, Once, 1 of 17 cycles     ramucirumab (Cyramza) 10 mg/kg = 1,000 mg in sodium chloride 0.9% 250 mL IV, 10 mg/kg, intravenous, Once, 1 of 17 cycles     Oncologic History:   Oncologic History:   DIAGNOSIS  NSCLC squamous cell ca     STAGING  T3N3M0, now with M1a progression      CURRENT SITES OF DISEASE  RLL, R hilar, subcarinal, supraclavicular, LLL     MOLECULAR GENOMICS  RLL:  PD-L1 <1%  PIK3CA amplification  TP53 splice site ([id number] c.673-1G>T)     LLL:  PD-L1 10%  PIK3CA amplification  TP53 splice site ([id number] c.[id number]>T)      PRIOR THERAPY  concurrent chemoradiation weekly carbo/taxol 2/21/23 - 4/3/23  durvalumab 4/25/23 - 5/9/23 (2 doses)     CURRENT THERAPY  Docetaxel/Ramucirumab 1/2/2024 2 cycle     CURRENT ONCOLOGICAL PROBLEMS     HISTORY OF PRESENT ILLNESS  Torres Rivera is a 53 yo M PMHx tobacco use disorder presenting for a followup visit for his 2nd cycle of docetaxel/ramicurimab.     Onc History     Pt presented to [facility] on 12/31/22 for acute chest pain. CT PE showed a mass like opacity in the RLL 5.9x4.3x2.5cm with subcarinal and R hilar adenopathy and postobstructive pneumonia. Mild interlobular septal thickening at the R lung base  was thought possibly due to lymphangitic carcinomatosis. He was prescribed antibiotics and prednisone. His chest pain resolved after a few days. He otherwise reports he had felt in good health. Denied new SOB, cough, weight loss, fatigue, change in appetite,  fever or chills. He has stable chronic SOB from smoking. He underwent bronchoscopy with biopsy on 1/11/23 at  with pathology revealing squamous cell ca of station 7, NGS without targetable mutation,  insufficient tissue for PD-L1. PET/CT revealed  FDG-avidity of R supclavicular node as well. Biopsy of the supraclavicular node positive for squamous cell ca. Offered concurrent chemoradiation with weekly carbo/taxol which he started on 2/21/23 and ended  4/3/23. He started durvalumab 4/25/23. Course c/b pneumonitis, and durvalumab held after 2 doses (5/2023). Treated with steroids with improvement. Restaging scans unfortunately with concern for new LLL nodule, and PET/CT 8/2023 with FDG avidity of new  lesion. Biopsy done 9/22/23, with pathology consistent with squamous cell ca, same PIK3CA and TP53 mutations. He is not interested in any further radiation and strongly prefers immunotherapy. Discussed risk of recurrent pneumonitis, which he understands. Plan to move forward with ipi/nivo which started 10/17/23. Unfortunately he had a scan after one cycle, with G1 asymptomatic pneumonitis, and possible progression. He agreed to docetaxel/ramucirumab as next line - is interested in clinical trial but cannot take off from work and is worried about finances.        PAST MEDICAL HISTORY  HTN  Tobacco use disorder   BPH  HLD  Osteoporosis   Psoriasis         SOCIAL HISTORY  Home: lives with his wife   Work:   Tobacco: quit 1/2023. 1PPD x 30 yrs   Alcohol: ~4 drinks/week  Drugs: none    Meds (Current):     Current Outpatient Medications on File Prior to Visit   Medication Sig Dispense Refill    dexAMETHasone (Decadron) 4 mg tablet Take 4 mg (1 tablet) twice daily for 3 days starting the day BEFORE treatment. 12 tablet 3    ondansetron (Zofran) 8 mg tablet Take 1 tablet (8 mg) by mouth every 8 hours if needed for nausea or vomiting. 30 tablet 5    prochlorperazine (Compazine) 10 mg tablet Take 1 tablet (10 mg) by mouth every 6 hours if needed for nausea or vomiting. 30 tablet 5    rosuvastatin (Crestor) 10 mg tablet Take 1 tablet (10 mg) by mouth once daily. 90 tablet 3    Ventolin HFA 90 mcg/actuation inhaler Inhale.      lisinopril 5 mg tablet Take 1 tablet  (5 mg) by mouth once daily. 90 tablet 2        ALLERGIES  NKDA     FAMILY HISTORY   No family history of lung cancer      Chief Concern: Here in clinic for follow-up and treatment with nivolumab      HPI   He is here today with his wife. He feels well and has no complaints. His SOB is at his baseline, and he says this improves after he takes the dexamethasone for his treatment. Starting today, he feels like there is something stuck in his throat, but he only noticed this on his way to the hospital. He had a self resolving episode of nausea/vomiting/diarrhea ~1 week ago which he attributes to food poisoning over the holidays. He was the only one to get sick.     In addition, he was noted to have a BP of 152/98 on check in. He reports taking his lisinopril 5mg daily. He is taking his zofran once daily, and is reporting no persistent nausea other than his episode detailed above.       Review of Systems - Oncology 12 point ROS was obtained and negative unless otherwise mentioned in the above HPI.       Objective   Visit Vitals  BP (!) 152/98 Comment: right arm bp is 149/98 pulse is 109   Pulse (!) 111   Temp 36.4 °C (97.5 °F) (Core)   Resp 18   Wt 100 kg (220 lb 14.4 oz)   SpO2 98%   BMI 34.60 kg/m²   Smoking Status Former   BSA 2.17 m²        Results for orders placed or performed in visit on 01/02/24 (from the past 24 hour(s))   CBC and Auto Differential   Result Value Ref Range    WBC 15.9 (H) 4.4 - 11.3 x10*3/uL    nRBC 0.0 0.0 - 0.0 /100 WBCs    RBC 5.38 4.50 - 5.90 x10*6/uL    Hemoglobin 15.3 13.5 - 17.5 g/dL    Hematocrit 44.8 41.0 - 52.0 %    MCV 83 80 - 100 fL    MCH 28.4 26.0 - 34.0 pg    MCHC 34.2 32.0 - 36.0 g/dL    RDW 15.1 (H) 11.5 - 14.5 %    Platelets 310 150 - 450 x10*3/uL    Neutrophils % 87.2 40.0 - 80.0 %    Immature Granulocytes %, Automated 1.2 (H) 0.0 - 0.9 %    Lymphocytes % 7.4 13.0 - 44.0 %    Monocytes % 4.0 2.0 - 10.0 %    Eosinophils % 0.0 0.0 - 6.0 %    Basophils % 0.2 0.0 - 2.0 %     Neutrophils Absolute 13.87 (H) 1.20 - 7.70 x10*3/uL    Immature Granulocytes Absolute, Automated 0.19 0.00 - 0.70 x10*3/uL    Lymphocytes Absolute 1.18 (L) 1.20 - 4.80 x10*3/uL    Monocytes Absolute 0.64 0.10 - 1.00 x10*3/uL    Eosinophils Absolute 0.00 0.00 - 0.70 x10*3/uL    Basophils Absolute 0.03 0.00 - 0.10 x10*3/uL   Comprehensive metabolic panel   Result Value Ref Range    Glucose 130 (H) 74 - 99 mg/dL    Sodium 139 136 - 145 mmol/L    Potassium 4.7 3.5 - 5.3 mmol/L    Chloride 104 98 - 107 mmol/L    Bicarbonate 25 21 - 32 mmol/L    Anion Gap 15 10 - 20 mmol/L    Urea Nitrogen 20 6 - 23 mg/dL    Creatinine 0.79 0.50 - 1.30 mg/dL    eGFR >90 >60 mL/min/1.73m*2    Calcium 9.4 8.6 - 10.3 mg/dL    Albumin 4.2 3.4 - 5.0 g/dL    Alkaline Phosphatase 85 33 - 120 U/L    Total Protein 6.8 6.4 - 8.2 g/dL    AST 19 9 - 39 U/L    Bilirubin, Total 0.4 0.0 - 1.2 mg/dL    ALT 33 10 - 52 U/L   Urinalysis with Reflex Microscopic   Result Value Ref Range    Color, Urine Yellow Straw, Yellow    Appearance, Urine Clear Clear    Specific Gravity, Urine 1.025 1.005 - 1.035    pH, Urine 5.0 5.0, 5.5, 6.0, 6.5, 7.0, 7.5, 8.0    Protein, Urine 30 (1+) (N) NEGATIVE mg/dL    Glucose, Urine NEGATIVE NEGATIVE mg/dL    Blood, Urine NEGATIVE NEGATIVE    Ketones, Urine 5 (TRACE) (A) NEGATIVE mg/dL    Bilirubin, Urine NEGATIVE NEGATIVE    Urobilinogen, Urine <2.0 <2.0 mg/dL    Nitrite, Urine NEGATIVE NEGATIVE    Leukocyte Esterase, Urine NEGATIVE NEGATIVE   Microscopic Only, Urine   Result Value Ref Range    WBC, Urine 1-5 1-5, NONE /HPF    RBC, Urine 1-2 NONE, 1-2, 3-5 /HPF    Mucus, Urine 3+ Reference range not established. /LPF    Hyaline Casts, Urine 1+ (A) NONE /LPF           ECOG Score: 0- Fully active, able to carry on all pre-disease performance w/o restriction.      Physical Exam  Constitutional:       Appearance: Normal appearance.   Eyes:      Pupils: Pupils are equal, round, and reactive to light.   Cardiovascular:      Rate and  Rhythm: Normal rate and regular rhythm.   Pulmonary:      Effort: Pulmonary effort is normal.      Breath sounds: Normal breath sounds.   Abdominal:      General: Bowel sounds are normal.      Palpations: Abdomen is soft.   Musculoskeletal:         General: Normal range of motion.   Skin:     General: Skin is warm and dry.   Neurological:      General: No focal deficit present.      Mental Status: He is alert and oriented to person, place, and time.   Psychiatric:         Mood and Affect: Mood normal.         Behavior: Behavior normal.            Results:       Imaging:     CT Chest w/ IV Contrast 11/23/2023:  IMPRESSION:  1. Interval increase in size and consolidation within the right lower  lobe treatment zone with areas of increased internal hypodensity  suspicious for recurrent disease in this location. In addition, there  has been interval increase in size of the solid left lower lobe  nodule which now demonstrates cavitation and multiple new bilateral  rounded pulmonary nodules are identified which are also suspicious  for progression of disease.  2. New areas of subpleural consolidation with ground-glass opacity in  the left lower and left upper lobes as well as scattered areas of  nodular and ground-glass opacity that are described in detail above.  All of these findings are suggestive of a component of pneumonitis in  the setting of new immunotherapy.  3. Stable subcarinal lymphadenopathy.        Assessment/Plan   Torres Rivera is a 53 y.o. male here for follow up      Torres Rivera is a 53 y.o. male with PMHx HTN, psoriasis, and tobacco use who presented with NSCLC squamous cell ca, no targetable mutations, neg PD-L1 who is s/p concurrent chemoradiation with weekly carbo/taxol then progression after holding durvalumab due to G2 pneumonitis. Had one cycle of ipi/nivo, but developed pneumonitis. He is now presenting to the clinic for cycle 2 of docetaxel/ramicurimab and is tolerating his treatment  well. Plan for next chest CT on 2/29.    #NSCLC squamous cell ca  - T3N3M0, Stage IIIC originally, now with M1a progression  - PD-L1 neg, no targetable mutations  - supraclavicular node biopsy-proven squamous cell ca  - met Dr. Lew and discussed concurrent chemoradiation  - he has concerns about radiation and treatment in general   - discussed if we move forward with curative-intent concurrent chemoradiation, would receive weekly carbo/taxol. discussed palliative treatment with combination chemo-immunotherapy would be the non-radiation option, unfortunately surgery is not a viable  option.  - discussed potential side effects of carbo/taxol including but not limited to allergic reaction, nausea/vomiting, diarrhea/constipation, fatigue, injury to liver/kidney, risk of infection, anemia/thrombocytopenia. consented 2/7/23  - C1D1 concurrent chemoRT with weekly carbo/taxol 2/21/23  - discussed a year of durvalumab afterwards broadly  - 3/28/23 Carbo/Taxol held for thrombocytopenia (66), given IVF in infusion for decreased fluid intake/tachycardia  -last radiation 4/3/23; last Carbo/Taxol 3/21/23  - consented for 1 year durvalumab 4/25/23 and received two doses  - CT scan concerning for pneumonitis in view of worsening SOB, prednisone initiated at 1mg/kg/day (90mg) as well as PPI/Bactrim prophylactically, completed steroid taper with resolution of symptoms.   - personally reviewed PET/CT with FDG-avidity  of LLL lesion and possible abdominal lymph node; will obtain biopsy of LLL lesion  - personally reviewed brain MRI without evidence of disease  - discussed results of biopsy of LLL nodule, with same histology and biomarkers. PD-L1 10%. Discussed likelihood that this is M1a progression. He remains uninterested in any further radiation and strongly would like to pursue immunotherapy. Discussed potential of recurrent pneumonitis. Discussed clinical trial option, which he is currently also not interested in.  - Full NGS  tempus on tissue - unfortunately insufficient tissue from most recent biopsy and original biopsy  - liquid NGS without targetable mutation  - Previously discussed ipi/nivo and risks, particularly risk of recurrent pneumonitis. Consented 10/17/23 and C1D1 10/17/23. Had unexpected restaging scan after C1, with G1 pneumonitis noted on scan. He is asymptomatic.   - discussed next line of therapy with either SoC docetaxel/ramucirumab vs clinical trial. He currently does not have short term disability insurance and is very concerned about being able to continue working while on clinical trial. Consented for docetaxel/ramucirumab 11/28/23  - started docetaxel/ramucirumab 12/12, labs for toxicity check WNL, no new symptoms as of 1/2/2024.  - Worsening HTN on 1/2, recommended he double lisinopril from 5->10 (likely a side effect of ramicurimab)      # G2 pneumonitis-irAE vs radiation pneumonitis- -> recurrent G1 pneumonitis  -asymptomatic, will monitor  -Stopped ipi/nivo     #Anxiety  -related to diagnosis of lung cancer  -Appreciate SW support     #Psoriasis  -Reports previously diagnosed by dermatology and was previously on Otezla when rash was more diffuse  -Currently on topical steroid, reports minimal benefit. Small rash present on the elbow  - offered dermatology referral at last visit, he deferred.

## 2024-01-11 NOTE — PROGRESS NOTES
. Biopsy to be reordered as patient would like to have this scheduled outside of the original order expiration date.

## 2024-01-23 ENCOUNTER — LAB (OUTPATIENT)
Dept: LAB | Facility: HOSPITAL | Age: 54
End: 2024-01-23
Payer: COMMERCIAL

## 2024-01-23 ENCOUNTER — OFFICE VISIT (OUTPATIENT)
Dept: HEMATOLOGY/ONCOLOGY | Facility: HOSPITAL | Age: 54
End: 2024-01-23
Payer: COMMERCIAL

## 2024-01-23 ENCOUNTER — INFUSION (OUTPATIENT)
Dept: HEMATOLOGY/ONCOLOGY | Facility: HOSPITAL | Age: 54
End: 2024-01-23
Payer: COMMERCIAL

## 2024-01-23 VITALS
HEART RATE: 84 BPM | RESPIRATION RATE: 18 BRPM | TEMPERATURE: 97.7 F | DIASTOLIC BLOOD PRESSURE: 85 MMHG | OXYGEN SATURATION: 96 % | SYSTOLIC BLOOD PRESSURE: 139 MMHG

## 2024-01-23 VITALS
RESPIRATION RATE: 18 BRPM | HEART RATE: 110 BPM | BODY MASS INDEX: 35.53 KG/M2 | OXYGEN SATURATION: 98 % | SYSTOLIC BLOOD PRESSURE: 132 MMHG | WEIGHT: 226.85 LBS | DIASTOLIC BLOOD PRESSURE: 90 MMHG | TEMPERATURE: 98.2 F

## 2024-01-23 DIAGNOSIS — C34.31 PRIMARY SQUAMOUS CELL CARCINOMA OF LOWER LOBE OF RIGHT LUNG (MULTI): ICD-10-CM

## 2024-01-23 LAB
ALBUMIN SERPL BCP-MCNC: 4.1 G/DL (ref 3.4–5)
ALP SERPL-CCNC: 66 U/L (ref 33–120)
ALT SERPL W P-5'-P-CCNC: 29 U/L (ref 10–52)
ANION GAP SERPL CALC-SCNC: 16 MMOL/L (ref 10–20)
APPEARANCE UR: CLEAR
AST SERPL W P-5'-P-CCNC: 18 U/L (ref 9–39)
BASOPHILS # BLD AUTO: 0.02 X10*3/UL (ref 0–0.1)
BASOPHILS NFR BLD AUTO: 0.2 %
BILIRUB SERPL-MCNC: 0.4 MG/DL (ref 0–1.2)
BILIRUB UR STRIP.AUTO-MCNC: NEGATIVE MG/DL
BUN SERPL-MCNC: 18 MG/DL (ref 6–23)
CALCIUM SERPL-MCNC: 9.3 MG/DL (ref 8.6–10.3)
CHLORIDE SERPL-SCNC: 107 MMOL/L (ref 98–107)
CO2 SERPL-SCNC: 21 MMOL/L (ref 21–32)
COLOR UR: YELLOW
CREAT SERPL-MCNC: 0.79 MG/DL (ref 0.5–1.3)
EGFRCR SERPLBLD CKD-EPI 2021: >90 ML/MIN/1.73M*2
EOSINOPHIL # BLD AUTO: 0 X10*3/UL (ref 0–0.7)
EOSINOPHIL NFR BLD AUTO: 0 %
ERYTHROCYTE [DISTWIDTH] IN BLOOD BY AUTOMATED COUNT: 17 % (ref 11.5–14.5)
GLUCOSE SERPL-MCNC: 181 MG/DL (ref 74–99)
GLUCOSE UR STRIP.AUTO-MCNC: NEGATIVE MG/DL
HCT VFR BLD AUTO: 42.5 % (ref 41–52)
HGB BLD-MCNC: 14.2 G/DL (ref 13.5–17.5)
IMM GRANULOCYTES # BLD AUTO: 0.19 X10*3/UL (ref 0–0.7)
IMM GRANULOCYTES NFR BLD AUTO: 1.6 % (ref 0–0.9)
KETONES UR STRIP.AUTO-MCNC: ABNORMAL MG/DL
LEUKOCYTE ESTERASE UR QL STRIP.AUTO: NEGATIVE
LYMPHOCYTES # BLD AUTO: 1.06 X10*3/UL (ref 1.2–4.8)
LYMPHOCYTES NFR BLD AUTO: 8.8 %
MCH RBC QN AUTO: 28.7 PG (ref 26–34)
MCHC RBC AUTO-ENTMCNC: 33.4 G/DL (ref 32–36)
MCV RBC AUTO: 86 FL (ref 80–100)
MONOCYTES # BLD AUTO: 0.46 X10*3/UL (ref 0.1–1)
MONOCYTES NFR BLD AUTO: 3.8 %
NEUTROPHILS # BLD AUTO: 10.32 X10*3/UL (ref 1.2–7.7)
NEUTROPHILS NFR BLD AUTO: 85.6 %
NITRITE UR QL STRIP.AUTO: NEGATIVE
NRBC BLD-RTO: 0 /100 WBCS (ref 0–0)
PH UR STRIP.AUTO: 5 [PH]
PLATELET # BLD AUTO: 287 X10*3/UL (ref 150–450)
POTASSIUM SERPL-SCNC: 4.3 MMOL/L (ref 3.5–5.3)
PROT SERPL-MCNC: 6.5 G/DL (ref 6.4–8.2)
PROT UR STRIP.AUTO-MCNC: NEGATIVE MG/DL
RBC # BLD AUTO: 4.95 X10*6/UL (ref 4.5–5.9)
RBC # UR STRIP.AUTO: NEGATIVE /UL
SODIUM SERPL-SCNC: 140 MMOL/L (ref 136–145)
SP GR UR STRIP.AUTO: 1.02
TSH SERPL-ACNC: 0.6 MIU/L (ref 0.44–3.98)
UROBILINOGEN UR STRIP.AUTO-MCNC: <2 MG/DL
WBC # BLD AUTO: 12.1 X10*3/UL (ref 4.4–11.3)

## 2024-01-23 PROCEDURE — 2500000004 HC RX 250 GENERAL PHARMACY W/ HCPCS (ALT 636 FOR OP/ED): Mod: JZ | Performed by: STUDENT IN AN ORGANIZED HEALTH CARE EDUCATION/TRAINING PROGRAM

## 2024-01-23 PROCEDURE — 85025 COMPLETE CBC W/AUTO DIFF WBC: CPT

## 2024-01-23 PROCEDURE — 2500000004 HC RX 250 GENERAL PHARMACY W/ HCPCS (ALT 636 FOR OP/ED): Performed by: STUDENT IN AN ORGANIZED HEALTH CARE EDUCATION/TRAINING PROGRAM

## 2024-01-23 PROCEDURE — 2500000001 HC RX 250 WO HCPCS SELF ADMINISTERED DRUGS (ALT 637 FOR MEDICARE OP): Performed by: STUDENT IN AN ORGANIZED HEALTH CARE EDUCATION/TRAINING PROGRAM

## 2024-01-23 PROCEDURE — 3075F SYST BP GE 130 - 139MM HG: CPT | Performed by: NURSE PRACTITIONER

## 2024-01-23 PROCEDURE — 84443 ASSAY THYROID STIM HORMONE: CPT

## 2024-01-23 PROCEDURE — 3080F DIAST BP >= 90 MM HG: CPT | Performed by: NURSE PRACTITIONER

## 2024-01-23 PROCEDURE — 99214 OFFICE O/P EST MOD 30 MIN: CPT | Performed by: NURSE PRACTITIONER

## 2024-01-23 PROCEDURE — 36415 COLL VENOUS BLD VENIPUNCTURE: CPT

## 2024-01-23 PROCEDURE — 96417 CHEMO IV INFUS EACH ADDL SEQ: CPT

## 2024-01-23 PROCEDURE — 81003 URINALYSIS AUTO W/O SCOPE: CPT

## 2024-01-23 PROCEDURE — 96413 CHEMO IV INFUSION 1 HR: CPT

## 2024-01-23 PROCEDURE — 80053 COMPREHEN METABOLIC PANEL: CPT

## 2024-01-23 PROCEDURE — 96375 TX/PRO/DX INJ NEW DRUG ADDON: CPT | Mod: INF

## 2024-01-23 PROCEDURE — 1036F TOBACCO NON-USER: CPT | Performed by: NURSE PRACTITIONER

## 2024-01-23 RX ORDER — FAMOTIDINE 10 MG/ML
20 INJECTION INTRAVENOUS ONCE AS NEEDED
Status: DISCONTINUED | OUTPATIENT
Start: 2024-01-23 | End: 2024-01-23 | Stop reason: HOSPADM

## 2024-01-23 RX ORDER — DIPHENHYDRAMINE HCL 50 MG
50 CAPSULE ORAL ONCE
Status: CANCELLED | OUTPATIENT
Start: 2024-01-23

## 2024-01-23 RX ORDER — PROCHLORPERAZINE MALEATE 10 MG
10 TABLET ORAL EVERY 6 HOURS PRN
Status: DISCONTINUED | OUTPATIENT
Start: 2024-01-23 | End: 2024-01-23 | Stop reason: HOSPADM

## 2024-01-23 RX ORDER — HEPARIN SODIUM 1000 [USP'U]/ML
2000 INJECTION, SOLUTION INTRAVENOUS; SUBCUTANEOUS AS NEEDED
Status: CANCELLED | OUTPATIENT
Start: 2024-01-23

## 2024-01-23 RX ORDER — DIPHENHYDRAMINE HCL 50 MG
50 CAPSULE ORAL ONCE
Status: COMPLETED | OUTPATIENT
Start: 2024-01-23 | End: 2024-01-23

## 2024-01-23 RX ORDER — PROCHLORPERAZINE EDISYLATE 5 MG/ML
10 INJECTION INTRAMUSCULAR; INTRAVENOUS EVERY 6 HOURS PRN
Status: DISCONTINUED | OUTPATIENT
Start: 2024-01-23 | End: 2024-01-23 | Stop reason: HOSPADM

## 2024-01-23 RX ORDER — ONDANSETRON HYDROCHLORIDE 2 MG/ML
8 INJECTION, SOLUTION INTRAVENOUS ONCE
Status: CANCELLED | OUTPATIENT
Start: 2024-01-23

## 2024-01-23 RX ORDER — HEPARIN SODIUM,PORCINE/PF 10 UNIT/ML
50 SYRINGE (ML) INTRAVENOUS AS NEEDED
Status: CANCELLED | OUTPATIENT
Start: 2024-01-23

## 2024-01-23 RX ORDER — EPINEPHRINE 0.3 MG/.3ML
0.3 INJECTION SUBCUTANEOUS EVERY 5 MIN PRN
Status: DISCONTINUED | OUTPATIENT
Start: 2024-01-23 | End: 2024-01-23 | Stop reason: HOSPADM

## 2024-01-23 RX ORDER — FAMOTIDINE 10 MG/ML
20 INJECTION INTRAVENOUS ONCE AS NEEDED
Status: CANCELLED | OUTPATIENT
Start: 2024-01-23

## 2024-01-23 RX ORDER — HEPARIN 100 UNIT/ML
500 SYRINGE INTRAVENOUS AS NEEDED
Status: CANCELLED | OUTPATIENT
Start: 2024-01-23

## 2024-01-23 RX ORDER — EPINEPHRINE 0.3 MG/.3ML
0.3 INJECTION SUBCUTANEOUS EVERY 5 MIN PRN
Status: CANCELLED | OUTPATIENT
Start: 2024-01-23

## 2024-01-23 RX ORDER — ALBUTEROL SULFATE 0.83 MG/ML
3 SOLUTION RESPIRATORY (INHALATION) AS NEEDED
Status: CANCELLED | OUTPATIENT
Start: 2024-01-23

## 2024-01-23 RX ORDER — ALBUTEROL SULFATE 0.83 MG/ML
3 SOLUTION RESPIRATORY (INHALATION) AS NEEDED
Status: DISCONTINUED | OUTPATIENT
Start: 2024-01-23 | End: 2024-01-23 | Stop reason: HOSPADM

## 2024-01-23 RX ORDER — DIPHENHYDRAMINE HYDROCHLORIDE 50 MG/ML
50 INJECTION INTRAMUSCULAR; INTRAVENOUS AS NEEDED
Status: DISCONTINUED | OUTPATIENT
Start: 2024-01-23 | End: 2024-01-23 | Stop reason: HOSPADM

## 2024-01-23 RX ORDER — ONDANSETRON HYDROCHLORIDE 2 MG/ML
8 INJECTION, SOLUTION INTRAVENOUS ONCE
Status: COMPLETED | OUTPATIENT
Start: 2024-01-23 | End: 2024-01-23

## 2024-01-23 RX ORDER — PROCHLORPERAZINE EDISYLATE 5 MG/ML
10 INJECTION INTRAMUSCULAR; INTRAVENOUS EVERY 6 HOURS PRN
Status: CANCELLED | OUTPATIENT
Start: 2024-01-23

## 2024-01-23 RX ORDER — DIPHENHYDRAMINE HYDROCHLORIDE 50 MG/ML
50 INJECTION INTRAMUSCULAR; INTRAVENOUS AS NEEDED
Status: CANCELLED | OUTPATIENT
Start: 2024-01-23

## 2024-01-23 RX ORDER — PROCHLORPERAZINE MALEATE 10 MG
10 TABLET ORAL EVERY 6 HOURS PRN
Status: CANCELLED | OUTPATIENT
Start: 2024-01-23

## 2024-01-23 RX ADMIN — DOCETAXEL 160 MG: 20 INJECTION, SOLUTION, CONCENTRATE INTRAVENOUS at 13:55

## 2024-01-23 RX ADMIN — SODIUM CHLORIDE 1000 MG: 9 INJECTION, SOLUTION INTRAVENOUS at 12:43

## 2024-01-23 RX ADMIN — DIPHENHYDRAMINE HYDROCHLORIDE 50 MG: 50 CAPSULE ORAL at 12:20

## 2024-01-23 RX ADMIN — ONDANSETRON 8 MG: 2 INJECTION INTRAMUSCULAR; INTRAVENOUS at 12:20

## 2024-01-23 ASSESSMENT — PATIENT HEALTH QUESTIONNAIRE - PHQ9
1. LITTLE INTEREST OR PLEASURE IN DOING THINGS: NOT AT ALL
2. FEELING DOWN, DEPRESSED OR HOPELESS: NOT AT ALL
SUM OF ALL RESPONSES TO PHQ9 QUESTIONS 1 AND 2: 0

## 2024-01-23 ASSESSMENT — ENCOUNTER SYMPTOMS
LOSS OF SENSATION IN FEET: 0
OCCASIONAL FEELINGS OF UNSTEADINESS: 0
DEPRESSION: 0

## 2024-01-23 ASSESSMENT — COLUMBIA-SUICIDE SEVERITY RATING SCALE - C-SSRS
6. HAVE YOU EVER DONE ANYTHING, STARTED TO DO ANYTHING, OR PREPARED TO DO ANYTHING TO END YOUR LIFE?: NO
1. IN THE PAST MONTH, HAVE YOU WISHED YOU WERE DEAD OR WISHED YOU COULD GO TO SLEEP AND NOT WAKE UP?: NO
2. HAVE YOU ACTUALLY HAD ANY THOUGHTS OF KILLING YOURSELF?: NO

## 2024-01-23 NOTE — PROGRESS NOTES
Torres Rivera is a 53 y.o. male who presents for Chemotherapy.    He is on the following chemotherapy regimen:     Treatment Plans       Name Type Plan Dates Plan Provider         Active    Ramucirumab + DOCEtaxel, 21 Day Cycles Oncology Treatment  11/28/2023 - Present Belia Blevins MD                  .    Since his last visit, he has been doing well.  Overall, he states his energy level is stable.  His appetite has been unchanged and poor.  He reports alopecia and psoriasis.  He has no other concerns.     PIV inserted without complications.  Patient tolerated infusions without any complications.  Vital signs stable. Blood return noted after docetaxel infusion. PIV removed and pressure dressing applied. Discharged home in stable condition.

## 2024-01-23 NOTE — PROGRESS NOTES
[facility] - Medical Oncology Follow-Up Visit     Patient ID: Torres Rivera is a 53 y.o. male      Current therapy:     DOCEtaxeL (Taxotere) 160 mg in dextrose 5 % in water (D5W) 258 mL IV, 75 mg/m2, intravenous, Once, 1 of 17 cycles     ramucirumab (Cyramza) 10 mg/kg = 1,000 mg in sodium chloride 0.9% 250 mL IV, 10 mg/kg, intravenous, Once, 1 of 17 cycles     Oncologic History:   Oncologic History:   DIAGNOSIS  NSCLC squamous cell ca     STAGING  T3N3M0, now with M1a progression      CURRENT SITES OF DISEASE  RLL, R hilar, subcarinal, supraclavicular, LLL     MOLECULAR GENOMICS  RLL:  PD-L1 <1%  PIK3CA amplification  TP53 splice site ([id number] c.673-1G>T)     LLL:  PD-L1 10%  PIK3CA amplification  TP53 splice site ([id number] c.673-1G>T)      PRIOR THERAPY  concurrent chemoradiation weekly carbo/taxol 2/21/23 - 4/3/23  durvalumab 4/25/23 - 5/9/23 (2 doses)     CURRENT THERAPY  Docetaxel/Ramucirumab 1/2/2024 2 cycle     CURRENT ONCOLOGICAL PROBLEMS     HISTORY OF PRESENT ILLNESS  Torres Rivera is a 53 yo M PMHx tobacco use disorder presenting for a followup visit for his 2nd cycle of docetaxel/ramicurimab.     Onc History     Pt presented to [facility] on 12/31/22 for acute chest pain. CT PE showed a mass like opacity in the RLL 5.9x4.3x2.5cm with subcarinal and R hilar adenopathy and postobstructive pneumonia. Mild interlobular septal thickening at the R lung base  was thought possibly due to lymphangitic carcinomatosis. He was prescribed antibiotics and prednisone. His chest pain resolved after a few days. He otherwise reports he had felt in good health. Denied new SOB, cough, weight loss, fatigue, change in appetite,  fever or chills. He has stable chronic SOB from smoking. He underwent bronchoscopy with biopsy on 1/11/23 at  with pathology revealing squamous cell ca of station 7, NGS without targetable mutation,  insufficient tissue for PD-L1. PET/CT revealed  FDG-avidity of R supclavicular node as well. Biopsy of the supraclavicular node positive for squamous cell ca. Offered concurrent chemoradiation with weekly carbo/taxol which he started on 2/21/23 and ended  4/3/23. He started durvalumab 4/25/23. Course c/b pneumonitis, and durvalumab held after 2 doses (5/2023). Treated with steroids with improvement. Restaging scans unfortunately with concern for new LLL nodule, and PET/CT 8/2023 with FDG avidity of new  lesion. Biopsy done 9/22/23, with pathology consistent with squamous cell ca, same PIK3CA and TP53 mutations. He is not interested in any further radiation and strongly prefers immunotherapy. Discussed risk of recurrent pneumonitis, which he understands. Plan to move forward with ipi/nivo which started 10/17/23. Unfortunately he had a scan after one cycle, with G1 asymptomatic pneumonitis, and possible progression. He agreed to docetaxel/ramucirumab as next line - is interested in clinical trial but cannot take off from work and is worried about finances.        PAST MEDICAL HISTORY  HTN  Tobacco use disorder   BPH  HLD  Osteoporosis   Psoriasis         SOCIAL HISTORY  Home: lives with his wife   Work:   Tobacco: quit 1/2023. 1PPD x 30 yrs   Alcohol: ~4 drinks/week  Drugs: none    Meds (Current):     Current Outpatient Medications on File Prior to Visit   Medication Sig Dispense Refill    dexAMETHasone (Decadron) 4 mg tablet Take 4 mg (1 tablet) twice daily for 3 days starting the day BEFORE treatment. 12 tablet 3    ondansetron (Zofran) 8 mg tablet Take 1 tablet (8 mg) by mouth every 8 hours if needed for nausea or vomiting. 30 tablet 5    prochlorperazine (Compazine) 10 mg tablet Take 1 tablet (10 mg) by mouth every 6 hours if needed for nausea or vomiting. 30 tablet 5    rosuvastatin (Crestor) 10 mg tablet Take 1 tablet (10 mg) by mouth once daily. 90 tablet 3    Ventolin HFA 90 mcg/actuation inhaler Inhale.      lisinopril 5 mg tablet Take 1 tablet  (5 mg) by mouth once daily. 90 tablet 2        ALLERGIES  NKDA     FAMILY HISTORY   No family history of lung cancer      Chief Concern: Here in clinic for follow-up and treatment with Docetaxel/Ramucirumab     HPI   He is here today with his wife. He feels well for treatment. States he worked last night, attributes this to elevated BP. Rechecked BP manually Left arm 130/82; Right arm 128/80. He continues lisinopril 10mg as instructed previously. Discussed lab results, attributes high blood glucose to eating a large plate of mashed potatoes for breakfast prior to his lab draw. Breathing is stable, no new respiratory symptoms. Notes food tasting bitter s/p treatment, this has resolved. Denies GI symptoms.          Review of Systems - Oncology 12 point ROS was obtained and negative unless otherwise mentioned in the above HPI.       Objective   Visit Vitals  /90 (BP Location: Right arm, Patient Position: Sitting, BP Cuff Size: Adult long)   Pulse 110   Temp 36.8 °C (98.2 °F) (Temporal)   Resp 18   Wt 103 kg (226 lb 13.7 oz)   SpO2 98%   BMI 35.53 kg/m²   Smoking Status Former   BSA 2.21 m²        No results found for this or any previous visit (from the past 24 hour(s)).          ECOG Score: 0- Fully active, able to carry on all pre-disease performance w/o restriction.      Physical Exam  Constitutional:       Appearance: Normal appearance.   Eyes:      Conjunctiva/sclera: Conjunctivae normal.      Pupils: Pupils are equal, round, and reactive to light.   Cardiovascular:      Rate and Rhythm: Regular rhythm.      Comments: Mild tachycardia    Pulmonary:      Effort: Pulmonary effort is normal.      Breath sounds: Normal breath sounds. No wheezing, rhonchi or rales.   Abdominal:      General: Bowel sounds are normal.      Palpations: Abdomen is soft.   Musculoskeletal:         General: Normal range of motion.   Skin:     General: Skin is warm.      Comments: Multiple dry, patchy skin noted to bilateral arms,  non-pruritic, no drainage, discussed using Eucerin    Neurological:      General: No focal deficit present.      Mental Status: He is alert and oriented to person, place, and time.   Psychiatric:         Mood and Affect: Mood normal.         Behavior: Behavior normal.                 Results:       Imaging:     CT Chest w/ IV Contrast 11/23/2023:  IMPRESSION:  1. Interval increase in size and consolidation within the right lower  lobe treatment zone with areas of increased internal hypodensity  suspicious for recurrent disease in this location. In addition, there  has been interval increase in size of the solid left lower lobe  nodule which now demonstrates cavitation and multiple new bilateral  rounded pulmonary nodules are identified which are also suspicious  for progression of disease.  2. New areas of subpleural consolidation with ground-glass opacity in  the left lower and left upper lobes as well as scattered areas of  nodular and ground-glass opacity that are described in detail above.  All of these findings are suggestive of a component of pneumonitis in  the setting of new immunotherapy.  3. Stable subcarinal lymphadenopathy.        Assessment/Plan   Torres Rivera is a 53 y.o. male here for follow up      Torres Rivera is a 53 y.o. male with PMHx HTN, psoriasis, and tobacco use who presented with NSCLC squamous cell ca, no targetable mutations, neg PD-L1 who is s/p concurrent chemoradiation with weekly carbo/taxol then progression after holding durvalumab due to G2 pneumonitis. Had one cycle of ipi/nivo, but developed pneumonitis.     He is now presenting to the clinic for cycle 3 of docetaxel/ramicurimab and continues to tolerate treatment well. Plan for next chest CT on 2/29.  -RTC in 3 weeks for follow-up and treatment (C4)    #NSCLC squamous cell ca  - T3N3M0, Stage IIIC originally, now with M1a progression  - PD-L1 neg, no targetable mutations  - supraclavicular node biopsy-proven squamous  cell ca  - met Dr. Lew and discussed concurrent chemoradiation  - he has concerns about radiation and treatment in general   - discussed if we move forward with curative-intent concurrent chemoradiation, would receive weekly carbo/taxol. discussed palliative treatment with combination chemo-immunotherapy would be the non-radiation option, unfortunately surgery is not a viable  option.  - discussed potential side effects of carbo/taxol including but not limited to allergic reaction, nausea/vomiting, diarrhea/constipation, fatigue, injury to liver/kidney, risk of infection, anemia/thrombocytopenia. consented 2/7/23  - C1D1 concurrent chemoRT with weekly carbo/taxol 2/21/23  - discussed a year of durvalumab afterwards broadly  - 3/28/23 Carbo/Taxol held for thrombocytopenia (66), given IVF in infusion for decreased fluid intake/tachycardia  -last radiation 4/3/23; last Carbo/Taxol 3/21/23  - consented for 1 year durvalumab 4/25/23 and received two doses  - CT scan concerning for pneumonitis in view of worsening SOB, prednisone initiated at 1mg/kg/day (90mg) as well as PPI/Bactrim prophylactically, completed steroid taper with resolution of symptoms.   - personally reviewed PET/CT with FDG-avidity  of LLL lesion and possible abdominal lymph node; will obtain biopsy of LLL lesion  - personally reviewed brain MRI without evidence of disease  - discussed results of biopsy of LLL nodule, with same histology and biomarkers. PD-L1 10%. Discussed likelihood that this is M1a progression. He remains uninterested in any further radiation and strongly would like to pursue immunotherapy. Discussed potential of recurrent pneumonitis. Discussed clinical trial option, which he is currently also not interested in.  - Full NGS tempus on tissue - unfortunately insufficient tissue from most recent biopsy and original biopsy  - liquid NGS without targetable mutation  - Previously discussed ipi/nivo and risks, particularly risk of  recurrent pneumonitis. Consented 10/17/23 and C1D1 10/17/23. Had unexpected restaging scan after C1, with G1 pneumonitis noted on scan. He is asymptomatic.   - discussed next line of therapy with either SoC docetaxel/ramucirumab vs clinical trial. He currently does not have short term disability insurance and is very concerned about being able to continue working while on clinical trial. Consented for docetaxel/ramucirumab 11/28/23  - started docetaxel/ramucirumab 12/12, labs for toxicity check WNL, no new symptoms as of 1/2/2024.  - Worsening HTN on 1/2, he continues lisinopril at 10 (likely a side effect of ramicurimab)        # G2 pneumonitis-irAE vs radiation pneumonitis- -> recurrent G1 pneumonitis  -asymptomatic, will monitor  -Stopped ipi/nivo     #Anxiety  -related to diagnosis of lung cancer  -Appreciate SW support     #Psoriasis  -Reports previously diagnosed by dermatology and was previously on Otezla when rash was more diffuse  -Currently on topical steroid, reports minimal benefit. Small rash present on the elbow  - offered dermatology referral at last visit, he deferred.

## 2024-02-05 ENCOUNTER — TELEPHONE (OUTPATIENT)
Dept: PRIMARY CARE | Facility: CLINIC | Age: 54
End: 2024-02-05
Payer: COMMERCIAL

## 2024-02-05 DIAGNOSIS — I10 BENIGN ESSENTIAL HYPERTENSION: Primary | ICD-10-CM

## 2024-02-05 RX ORDER — LISINOPRIL 20 MG/1
20 TABLET ORAL DAILY
Qty: 30 TABLET | Refills: 6 | Status: SHIPPED
Start: 2024-02-05 | End: 2024-02-23

## 2024-02-05 NOTE — TELEPHONE ENCOUNTER
The patient called complaining of high blood pressure.  Stated his blood pressure runs in between 170-190/100 mmHg.  His oncologist recommended to increase lisinopril to 40 mg but he increased it to 20 mg 2 days ago.  His blood pressure is better controlled which is in between 140-150 mg.  I sent the prescription for 20 mg and asked the patient to monitor his blood pressure daily and call me in 7-10 days with blood pressure readings.  If elevated, we will increase the dose to 40 mg.

## 2024-02-08 ENCOUNTER — TELEPHONE (OUTPATIENT)
Dept: HEMATOLOGY/ONCOLOGY | Facility: CLINIC | Age: 54
End: 2024-02-08

## 2024-02-13 ENCOUNTER — INFUSION (OUTPATIENT)
Dept: HEMATOLOGY/ONCOLOGY | Facility: HOSPITAL | Age: 54
End: 2024-02-13
Payer: COMMERCIAL

## 2024-02-13 ENCOUNTER — OFFICE VISIT (OUTPATIENT)
Dept: HEMATOLOGY/ONCOLOGY | Facility: HOSPITAL | Age: 54
End: 2024-02-13
Payer: COMMERCIAL

## 2024-02-13 ENCOUNTER — LAB (OUTPATIENT)
Dept: LAB | Facility: HOSPITAL | Age: 54
End: 2024-02-13
Payer: COMMERCIAL

## 2024-02-13 VITALS
TEMPERATURE: 98.6 F | SYSTOLIC BLOOD PRESSURE: 138 MMHG | DIASTOLIC BLOOD PRESSURE: 98 MMHG | OXYGEN SATURATION: 98 % | HEART RATE: 103 BPM | RESPIRATION RATE: 18 BRPM | BODY MASS INDEX: 35.29 KG/M2 | WEIGHT: 225.31 LBS

## 2024-02-13 VITALS
SYSTOLIC BLOOD PRESSURE: 135 MMHG | RESPIRATION RATE: 16 BRPM | TEMPERATURE: 97.7 F | HEART RATE: 91 BPM | DIASTOLIC BLOOD PRESSURE: 95 MMHG

## 2024-02-13 DIAGNOSIS — C34.31 PRIMARY SQUAMOUS CELL CARCINOMA OF LOWER LOBE OF RIGHT LUNG (MULTI): ICD-10-CM

## 2024-02-13 LAB
ALBUMIN SERPL BCP-MCNC: 4.1 G/DL (ref 3.4–5)
ALP SERPL-CCNC: 63 U/L (ref 33–120)
ALT SERPL W P-5'-P-CCNC: 26 U/L (ref 10–52)
ANION GAP SERPL CALC-SCNC: 14 MMOL/L (ref 10–20)
APPEARANCE UR: CLEAR
AST SERPL W P-5'-P-CCNC: 19 U/L (ref 9–39)
BASOPHILS # BLD AUTO: 0.02 X10*3/UL (ref 0–0.1)
BASOPHILS NFR BLD AUTO: 0.1 %
BILIRUB SERPL-MCNC: 0.5 MG/DL (ref 0–1.2)
BILIRUB UR STRIP.AUTO-MCNC: NEGATIVE MG/DL
BUN SERPL-MCNC: 16 MG/DL (ref 6–23)
CALCIUM SERPL-MCNC: 9.3 MG/DL (ref 8.6–10.3)
CHLORIDE SERPL-SCNC: 105 MMOL/L (ref 98–107)
CO2 SERPL-SCNC: 23 MMOL/L (ref 21–32)
COLOR UR: NORMAL
CREAT SERPL-MCNC: 0.72 MG/DL (ref 0.5–1.3)
EGFRCR SERPLBLD CKD-EPI 2021: >90 ML/MIN/1.73M*2
EOSINOPHIL # BLD AUTO: 0 X10*3/UL (ref 0–0.7)
EOSINOPHIL NFR BLD AUTO: 0 %
ERYTHROCYTE [DISTWIDTH] IN BLOOD BY AUTOMATED COUNT: 18.1 % (ref 11.5–14.5)
GLUCOSE SERPL-MCNC: 123 MG/DL (ref 74–99)
GLUCOSE UR STRIP.AUTO-MCNC: NORMAL MG/DL
HCT VFR BLD AUTO: 43.1 % (ref 41–52)
HGB BLD-MCNC: 14.4 G/DL (ref 13.5–17.5)
IMM GRANULOCYTES # BLD AUTO: 0.15 X10*3/UL (ref 0–0.7)
IMM GRANULOCYTES NFR BLD AUTO: 1.1 % (ref 0–0.9)
KETONES UR STRIP.AUTO-MCNC: NEGATIVE MG/DL
LEUKOCYTE ESTERASE UR QL STRIP.AUTO: NEGATIVE
LYMPHOCYTES # BLD AUTO: 0.96 X10*3/UL (ref 1.2–4.8)
LYMPHOCYTES NFR BLD AUTO: 7 %
MCH RBC QN AUTO: 28.5 PG (ref 26–34)
MCHC RBC AUTO-ENTMCNC: 33.4 G/DL (ref 32–36)
MCV RBC AUTO: 85 FL (ref 80–100)
MONOCYTES # BLD AUTO: 0.58 X10*3/UL (ref 0.1–1)
MONOCYTES NFR BLD AUTO: 4.3 %
NEUTROPHILS # BLD AUTO: 11.92 X10*3/UL (ref 1.2–7.7)
NEUTROPHILS NFR BLD AUTO: 87.5 %
NITRITE UR QL STRIP.AUTO: NEGATIVE
NRBC BLD-RTO: 0 /100 WBCS (ref 0–0)
PH UR STRIP.AUTO: 5.5 [PH]
PLATELET # BLD AUTO: 278 X10*3/UL (ref 150–450)
POTASSIUM SERPL-SCNC: 4.4 MMOL/L (ref 3.5–5.3)
PROT SERPL-MCNC: 6.5 G/DL (ref 6.4–8.2)
PROT UR STRIP.AUTO-MCNC: NEGATIVE MG/DL
RBC # BLD AUTO: 5.06 X10*6/UL (ref 4.5–5.9)
RBC # UR STRIP.AUTO: NEGATIVE /UL
SODIUM SERPL-SCNC: 138 MMOL/L (ref 136–145)
SP GR UR STRIP.AUTO: 1.01
UROBILINOGEN UR STRIP.AUTO-MCNC: NORMAL MG/DL
WBC # BLD AUTO: 13.6 X10*3/UL (ref 4.4–11.3)

## 2024-02-13 PROCEDURE — 2500000004 HC RX 250 GENERAL PHARMACY W/ HCPCS (ALT 636 FOR OP/ED): Mod: JZ | Performed by: STUDENT IN AN ORGANIZED HEALTH CARE EDUCATION/TRAINING PROGRAM

## 2024-02-13 PROCEDURE — 3075F SYST BP GE 130 - 139MM HG: CPT | Performed by: STUDENT IN AN ORGANIZED HEALTH CARE EDUCATION/TRAINING PROGRAM

## 2024-02-13 PROCEDURE — 2500000001 HC RX 250 WO HCPCS SELF ADMINISTERED DRUGS (ALT 637 FOR MEDICARE OP): Performed by: STUDENT IN AN ORGANIZED HEALTH CARE EDUCATION/TRAINING PROGRAM

## 2024-02-13 PROCEDURE — 81003 URINALYSIS AUTO W/O SCOPE: CPT

## 2024-02-13 PROCEDURE — 99214 OFFICE O/P EST MOD 30 MIN: CPT | Mod: GC | Performed by: STUDENT IN AN ORGANIZED HEALTH CARE EDUCATION/TRAINING PROGRAM

## 2024-02-13 PROCEDURE — 84075 ASSAY ALKALINE PHOSPHATASE: CPT

## 2024-02-13 PROCEDURE — 36415 COLL VENOUS BLD VENIPUNCTURE: CPT

## 2024-02-13 PROCEDURE — 2500000004 HC RX 250 GENERAL PHARMACY W/ HCPCS (ALT 636 FOR OP/ED): Performed by: STUDENT IN AN ORGANIZED HEALTH CARE EDUCATION/TRAINING PROGRAM

## 2024-02-13 PROCEDURE — 99214 OFFICE O/P EST MOD 30 MIN: CPT | Performed by: STUDENT IN AN ORGANIZED HEALTH CARE EDUCATION/TRAINING PROGRAM

## 2024-02-13 PROCEDURE — 85025 COMPLETE CBC W/AUTO DIFF WBC: CPT

## 2024-02-13 PROCEDURE — 96417 CHEMO IV INFUS EACH ADDL SEQ: CPT

## 2024-02-13 PROCEDURE — 96375 TX/PRO/DX INJ NEW DRUG ADDON: CPT | Mod: INF

## 2024-02-13 PROCEDURE — 1036F TOBACCO NON-USER: CPT | Performed by: STUDENT IN AN ORGANIZED HEALTH CARE EDUCATION/TRAINING PROGRAM

## 2024-02-13 PROCEDURE — 96413 CHEMO IV INFUSION 1 HR: CPT

## 2024-02-13 PROCEDURE — 3080F DIAST BP >= 90 MM HG: CPT | Performed by: STUDENT IN AN ORGANIZED HEALTH CARE EDUCATION/TRAINING PROGRAM

## 2024-02-13 RX ORDER — DIPHENHYDRAMINE HYDROCHLORIDE 50 MG/ML
50 INJECTION INTRAMUSCULAR; INTRAVENOUS AS NEEDED
Status: CANCELLED | OUTPATIENT
Start: 2024-02-13

## 2024-02-13 RX ORDER — FAMOTIDINE 10 MG/ML
20 INJECTION INTRAVENOUS ONCE AS NEEDED
Status: DISCONTINUED | OUTPATIENT
Start: 2024-02-13 | End: 2024-02-13 | Stop reason: HOSPADM

## 2024-02-13 RX ORDER — PROCHLORPERAZINE EDISYLATE 5 MG/ML
10 INJECTION INTRAMUSCULAR; INTRAVENOUS EVERY 6 HOURS PRN
Status: CANCELLED | OUTPATIENT
Start: 2024-02-13

## 2024-02-13 RX ORDER — HEPARIN SODIUM 1000 [USP'U]/ML
2000 INJECTION, SOLUTION INTRAVENOUS; SUBCUTANEOUS AS NEEDED
Status: CANCELLED | OUTPATIENT
Start: 2024-02-13

## 2024-02-13 RX ORDER — ALBUTEROL SULFATE 0.83 MG/ML
3 SOLUTION RESPIRATORY (INHALATION) AS NEEDED
Status: CANCELLED | OUTPATIENT
Start: 2024-02-13

## 2024-02-13 RX ORDER — EPINEPHRINE 0.3 MG/.3ML
0.3 INJECTION SUBCUTANEOUS EVERY 5 MIN PRN
Status: CANCELLED | OUTPATIENT
Start: 2024-02-13

## 2024-02-13 RX ORDER — PROCHLORPERAZINE MALEATE 10 MG
10 TABLET ORAL EVERY 6 HOURS PRN
Status: DISCONTINUED | OUTPATIENT
Start: 2024-02-13 | End: 2024-02-13 | Stop reason: HOSPADM

## 2024-02-13 RX ORDER — ONDANSETRON HYDROCHLORIDE 2 MG/ML
8 INJECTION, SOLUTION INTRAVENOUS ONCE
Status: CANCELLED | OUTPATIENT
Start: 2024-02-13

## 2024-02-13 RX ORDER — EPINEPHRINE 0.3 MG/.3ML
0.3 INJECTION SUBCUTANEOUS EVERY 5 MIN PRN
Status: DISCONTINUED | OUTPATIENT
Start: 2024-02-13 | End: 2024-02-13 | Stop reason: HOSPADM

## 2024-02-13 RX ORDER — DEXAMETHASONE SODIUM PHOSPHATE 4 MG/ML
10 INJECTION, SOLUTION INTRA-ARTICULAR; INTRALESIONAL; INTRAMUSCULAR; INTRAVENOUS; SOFT TISSUE ONCE
Status: CANCELLED | OUTPATIENT
Start: 2024-02-13

## 2024-02-13 RX ORDER — ALBUTEROL SULFATE 0.83 MG/ML
3 SOLUTION RESPIRATORY (INHALATION) AS NEEDED
Status: DISCONTINUED | OUTPATIENT
Start: 2024-02-13 | End: 2024-02-13 | Stop reason: HOSPADM

## 2024-02-13 RX ORDER — HEPARIN 100 UNIT/ML
500 SYRINGE INTRAVENOUS AS NEEDED
Status: CANCELLED | OUTPATIENT
Start: 2024-02-13

## 2024-02-13 RX ORDER — PROCHLORPERAZINE EDISYLATE 5 MG/ML
10 INJECTION INTRAMUSCULAR; INTRAVENOUS EVERY 6 HOURS PRN
Status: DISCONTINUED | OUTPATIENT
Start: 2024-02-13 | End: 2024-02-13 | Stop reason: HOSPADM

## 2024-02-13 RX ORDER — DIPHENHYDRAMINE HCL 50 MG
50 CAPSULE ORAL ONCE
Status: COMPLETED | OUTPATIENT
Start: 2024-02-13 | End: 2024-02-13

## 2024-02-13 RX ORDER — HEPARIN SODIUM,PORCINE/PF 10 UNIT/ML
50 SYRINGE (ML) INTRAVENOUS AS NEEDED
Status: CANCELLED | OUTPATIENT
Start: 2024-02-13

## 2024-02-13 RX ORDER — PROCHLORPERAZINE MALEATE 10 MG
10 TABLET ORAL EVERY 6 HOURS PRN
Status: CANCELLED | OUTPATIENT
Start: 2024-02-13

## 2024-02-13 RX ORDER — DIPHENHYDRAMINE HCL 50 MG
50 CAPSULE ORAL ONCE
Status: CANCELLED | OUTPATIENT
Start: 2024-02-13

## 2024-02-13 RX ORDER — FAMOTIDINE 10 MG/ML
20 INJECTION INTRAVENOUS ONCE AS NEEDED
Status: CANCELLED | OUTPATIENT
Start: 2024-02-13

## 2024-02-13 RX ORDER — DEXAMETHASONE SODIUM PHOSPHATE 4 MG/ML
10 INJECTION, SOLUTION INTRA-ARTICULAR; INTRALESIONAL; INTRAMUSCULAR; INTRAVENOUS; SOFT TISSUE ONCE
Status: COMPLETED | OUTPATIENT
Start: 2024-02-13 | End: 2024-02-13

## 2024-02-13 RX ORDER — DIPHENHYDRAMINE HYDROCHLORIDE 50 MG/ML
50 INJECTION INTRAMUSCULAR; INTRAVENOUS AS NEEDED
Status: DISCONTINUED | OUTPATIENT
Start: 2024-02-13 | End: 2024-02-13 | Stop reason: HOSPADM

## 2024-02-13 RX ADMIN — DIPHENHYDRAMINE HYDROCHLORIDE 50 MG: 50 CAPSULE ORAL at 10:29

## 2024-02-13 RX ADMIN — DEXTROSE MONOHYDRATE 160 MG: 12500 INJECTION, SOLUTION INTRAVENOUS at 12:08

## 2024-02-13 RX ADMIN — DEXAMETHASONE SODIUM PHOSPHATE 10 MG: 4 INJECTION, SOLUTION INTRA-ARTICULAR; INTRALESIONAL; INTRAMUSCULAR; INTRAVENOUS; SOFT TISSUE at 10:29

## 2024-02-13 RX ADMIN — SODIUM CHLORIDE 1000 MG: 9 INJECTION, SOLUTION INTRAVENOUS at 11:15

## 2024-02-13 ASSESSMENT — ENCOUNTER SYMPTOMS
OCCASIONAL FEELINGS OF UNSTEADINESS: 0
DEPRESSION: 0
LOSS OF SENSATION IN FEET: 0

## 2024-02-13 ASSESSMENT — PAIN SCALES - GENERAL: PAINLEVEL: 0-NO PAIN

## 2024-02-13 NOTE — PROGRESS NOTES
Torres Rivera is a 54 y.o. male who presents for Chemotherapy.    He is on the following chemotherapy regimen:     Treatment Plans       Name Type Plan Dates Plan Provider         Active    Ramucirumab + DOCEtaxel, 21 Day Cycles Oncology Treatment  11/28/2023 - Present Belia Blevins MD                  .    Since his last visit, he has been doing well.  Overall, he states his energy level is stable.  His appetite has been unchanged.  He reports no complaints.  He has no other concerns.    Lines of note: PIV Right Hand  Site Maintenance: Flushed, Positive blood return, and Positive blood return from central line, pre and post vesicant infusion..  Line Removal:  PIV removed, pressure dressing applied.     Patient tolerated treatment without any complications.  Discharged home with family in stable condition.      
PAST MEDICAL HISTORY:  H/O aortic valve disorder     Leukemia in remission    Schizoaffective disorder, depressive type

## 2024-02-13 NOTE — PROGRESS NOTES
[facility] [place] - Medical Oncology Follow-Up Visit     Patient ID: Torres Rivera is a 53 y.o. male      Current therapy:     DOCEtaxeL (Taxotere) 160 mg in dextrose 5 % in water (D5W) 258 mL IV, 75 mg/m2, intravenous, Once, 1 of 17 cycles     ramucirumab (Cyramza) 10 mg/kg = 1,000 mg in sodium chloride 0.9% 250 mL IV, 10 mg/kg, intravenous, Once, 1 of 17 cycles     Oncologic History:   Oncologic History:   DIAGNOSIS  NSCLC squamous cell ca     STAGING  T3N3M0, now with M1a progression      CURRENT SITES OF DISEASE  RLL, R hilar, subcarinal, supraclavicular, LLL     MOLECULAR GENOMICS  RLL:  PD-L1 <1%  PIK3CA amplification  TP53 splice site ([id number] c.[id number]>T)     LLL:  PD-L1 10%  PIK3CA amplification  TP53 splice site ([id number] c.[id number]>T)      PRIOR THERAPY  concurrent chemoradiation weekly carbo/taxol 2/21/23 - 4/3/23  durvalumab 4/25/23 - 5/9/23 (2 doses)     CURRENT THERAPY  Docetaxel/Ramucirumab 12/12/2024 2- present     CURRENT ONCOLOGICAL PROBLEMS     HISTORY OF PRESENT ILLNESS  Torres Rivera is a 53 yo M PMHx tobacco use disorder presenting for a followup visit for his 4th cycle of docetaxel/ramicurimab.     Onc History     Pt presented to [facility] on 12/31/22 for acute chest pain. CT PE showed a mass like opacity in the RLL 5.9x4.3x2.5cm with subcarinal and R hilar adenopathy and postobstructive pneumonia. Mild interlobular septal thickening at the R lung base  was thought possibly due to lymphangitic carcinomatosis. He was prescribed antibiotics and prednisone. His chest pain resolved after a few days. He otherwise reports he had felt in good health. Denied new SOB, cough, weight loss, fatigue, change in appetite,  fever or chills. He has stable chronic SOB from smoking. He underwent bronchoscopy with biopsy on 1/11/23 at  with pathology revealing squamous cell ca of station 7, NGS without targetable mutation,  insufficient tissue for PD-L1. PET/CT  revealed FDG-avidity of R supclavicular node as well. Biopsy of the supraclavicular node positive for squamous cell ca. Offered concurrent chemoradiation with weekly carbo/taxol which he started on 2/21/23 and ended  4/3/23. He started durvalumab 4/25/23. Course c/b pneumonitis, and durvalumab held after 2 doses (5/2023). Treated with steroids with improvement. Restaging scans unfortunately with concern for new LLL nodule, and PET/CT 8/2023 with FDG avidity of new  lesion. Biopsy done 9/22/23, with pathology consistent with squamous cell ca, same PIK3CA and TP53 mutations. He is not interested in any further radiation and strongly prefers immunotherapy. Discussed risk of recurrent pneumonitis, which he understands. Plan to move forward with ipi/nivo which started 10/17/23. Unfortunately he had a scan after one cycle, with G1 asymptomatic pneumonitis, and possible progression. He agreed to docetaxel/ramucirumab as next line - is interested in clinical trial but cannot take off from work and is worried about finances.        PAST MEDICAL HISTORY  HTN  Tobacco use disorder   BPH  HLD  Osteoporosis   Psoriasis      SOCIAL HISTORY  Home: lives with his wife   Work:   Tobacco: quit 1/2023. 1PPD x 30 yrs   Alcohol: ~4 drinks/week  Drugs: none    Meds (Current):     Current Outpatient Medications on File Prior to Visit   Medication Sig Dispense Refill    dexAMETHasone (Decadron) 4 mg tablet Take 4 mg (1 tablet) twice daily for 3 days starting the day BEFORE treatment. 12 tablet 3    ondansetron (Zofran) 8 mg tablet Take 1 tablet (8 mg) by mouth every 8 hours if needed for nausea or vomiting. 30 tablet 5    prochlorperazine (Compazine) 10 mg tablet Take 1 tablet (10 mg) by mouth every 6 hours if needed for nausea or vomiting. 30 tablet 5    rosuvastatin (Crestor) 10 mg tablet Take 1 tablet (10 mg) by mouth once daily. 90 tablet 3    Ventolin HFA 90 mcg/actuation inhaler Inhale.      lisinopril 5 mg tablet Take 1  tablet (5 mg) by mouth once daily. 90 tablet 2        ALLERGIES  NKDA     FAMILY HISTORY   No family history of lung cancer      Chief Concern: Here in clinic for follow-up and treatment with docetaxel/ramicurumab      HPI   Patient is seen in clinic today with his wife. He feels overall well and denies new complaints. Reports continued fatigue and dyspnea on exertion. Patient also reports ongoing insomnia, which has not responded well to melatonin. He has been checking his BP regularly and it has improved after increasing Lisinopril to 20 mg daily. Patient denies fevers/chills, anorexia, weight loss, CP, N/V, abdominal pain, or LE edema.        Review of Systems - Oncology 12 point ROS was obtained and negative unless otherwise mentioned in the above HPI.       Objective   Visit Vitals  BP (!) 138/98 (BP Location: Right arm, Patient Position: Sitting, BP Cuff Size: Adult)   Pulse 103   Temp 37 °C (98.6 °F) (Temporal)   Resp 18   Wt 102 kg (225 lb 5 oz)   SpO2 98%   BMI 35.29 kg/m²   Smoking Status Former   BSA 2.2 m²        Results for orders placed or performed in visit on 02/13/24 (from the past 24 hour(s))   CBC and Auto Differential   Result Value Ref Range    WBC 13.6 (H) 4.4 - 11.3 x10*3/uL    nRBC 0.0 0.0 - 0.0 /100 WBCs    RBC 5.06 4.50 - 5.90 x10*6/uL    Hemoglobin 14.4 13.5 - 17.5 g/dL    Hematocrit 43.1 41.0 - 52.0 %    MCV 85 80 - 100 fL    MCH 28.5 26.0 - 34.0 pg    MCHC 33.4 32.0 - 36.0 g/dL    RDW 18.1 (H) 11.5 - 14.5 %    Platelets 278 150 - 450 x10*3/uL    Neutrophils % 87.5 40.0 - 80.0 %    Immature Granulocytes %, Automated 1.1 (H) 0.0 - 0.9 %    Lymphocytes % 7.0 13.0 - 44.0 %    Monocytes % 4.3 2.0 - 10.0 %    Eosinophils % 0.0 0.0 - 6.0 %    Basophils % 0.1 0.0 - 2.0 %    Neutrophils Absolute 11.92 (H) 1.20 - 7.70 x10*3/uL    Immature Granulocytes Absolute, Automated 0.15 0.00 - 0.70 x10*3/uL    Lymphocytes Absolute 0.96 (L) 1.20 - 4.80 x10*3/uL    Monocytes Absolute 0.58 0.10 - 1.00 x10*3/uL     Eosinophils Absolute 0.00 0.00 - 0.70 x10*3/uL    Basophils Absolute 0.02 0.00 - 0.10 x10*3/uL   Comprehensive metabolic panel   Result Value Ref Range    Glucose 123 (H) 74 - 99 mg/dL    Sodium 138 136 - 145 mmol/L    Potassium 4.4 3.5 - 5.3 mmol/L    Chloride 105 98 - 107 mmol/L    Bicarbonate 23 21 - 32 mmol/L    Anion Gap 14 10 - 20 mmol/L    Urea Nitrogen 16 6 - 23 mg/dL    Creatinine 0.72 0.50 - 1.30 mg/dL    eGFR >90 >60 mL/min/1.73m*2    Calcium 9.3 8.6 - 10.3 mg/dL    Albumin 4.1 3.4 - 5.0 g/dL    Alkaline Phosphatase 63 33 - 120 U/L    Total Protein 6.5 6.4 - 8.2 g/dL    AST 19 9 - 39 U/L    Bilirubin, Total 0.5 0.0 - 1.2 mg/dL    ALT 26 10 - 52 U/L           ECOG Score: 0- Fully active, able to carry on all pre-disease performance w/o restriction.      Physical Exam  Constitutional:       Appearance: Normal appearance.   Eyes:      Pupils: Pupils are equal, round, and reactive to light.   Cardiovascular:      Rate and Rhythm: Normal rate and regular rhythm.   Pulmonary:      Effort: Pulmonary effort is normal.      Breath sounds: Normal breath sounds.   Abdominal:      General: Bowel sounds are normal.      Palpations: Abdomen is soft.   Musculoskeletal:         General: Normal range of motion.   Skin:     General: Skin is warm and dry.   Neurological:      General: No focal deficit present.      Mental Status: He is alert and oriented to person, place, and time.   Psychiatric:         Mood and Affect: Mood normal.         Behavior: Behavior normal.            Results:       Imaging:     CT Chest w/ IV Contrast 11/23/2023:  IMPRESSION:  1. Interval increase in size and consolidation within the right lower  lobe treatment zone with areas of increased internal hypodensity  suspicious for recurrent disease in this location. In addition, there  has been interval increase in size of the solid left lower lobe  nodule which now demonstrates cavitation and multiple new bilateral  rounded pulmonary nodules are  identified which are also suspicious  for progression of disease.  2. New areas of subpleural consolidation with ground-glass opacity in  the left lower and left upper lobes as well as scattered areas of  nodular and ground-glass opacity that are described in detail above.  All of these findings are suggestive of a component of pneumonitis in  the setting of new immunotherapy.  3. Stable subcarinal lymphadenopathy.        Assessment/Plan   Torres Rivera is a 53 y.o. male here for follow up.      Torres Rivera is a 53 y.o. male with PMHx HTN, psoriasis, and tobacco use who presented with NSCLC squamous cell ca, no targetable mutations, neg PD-L1 who is s/p concurrent chemoradiation with weekly carbo/taxol then progression after holding durvalumab due to G2 pneumonitis. Had one cycle of ipi/nivo, but developed pneumonitis. He is now presenting to the clinic for cycle 4 of docetaxel/ramicurimab and is tolerating his treatment well. Plan for next chest CT on 2/29.    #NSCLC squamous cell ca  - T3N3M0, Stage IIIC originally, now with M1a progression  - PD-L1 neg, no targetable mutations  - supraclavicular node biopsy-proven squamous cell ca  - met Dr. Lew and discussed concurrent chemoradiation  - he has concerns about radiation and treatment in general   - discussed if we move forward with curative-intent concurrent chemoradiation, would receive weekly carbo/taxol. discussed palliative treatment with combination chemo-immunotherapy would be the non-radiation option, unfortunately surgery is not a viable  option.  - discussed potential side effects of carbo/taxol including but not limited to allergic reaction, nausea/vomiting, diarrhea/constipation, fatigue, injury to liver/kidney, risk of infection, anemia/thrombocytopenia. consented 2/7/23  - C1D1 concurrent chemoRT with weekly carbo/taxol 2/21/23  - discussed a year of durvalumab afterwards broadly  - 3/28/23 Carbo/Taxol held for thrombocytopenia (66),  given IVF in infusion for decreased fluid intake/tachycardia  -last radiation 4/3/23; last Carbo/Taxol 3/21/23  - consented for 1 year durvalumab 4/25/23 and received two doses  - CT scan concerning for pneumonitis in view of worsening SOB, prednisone initiated at 1mg/kg/day (90mg) as well as PPI/Bactrim prophylactically, completed steroid taper with resolution of symptoms.   - personally reviewed PET/CT with FDG-avidity  of LLL lesion and possible abdominal lymph node; will obtain biopsy of LLL lesion  - personally reviewed brain MRI without evidence of disease  - discussed results of biopsy of LLL nodule, with same histology and biomarkers. PD-L1 10%. Discussed likelihood that this is M1a progression. He remains uninterested in any further radiation and strongly would like to pursue immunotherapy. Discussed potential of recurrent pneumonitis. Discussed clinical trial option, which he is currently also not interested in.  - Full NGS tempus on tissue - unfortunately insufficient tissue from most recent biopsy and original biopsy  - liquid NGS without targetable mutation  - Previously discussed ipi/nivo and risks, particularly risk of recurrent pneumonitis. Consented 10/17/23 and C1D1 10/17/23. Had unexpected restaging scan after C1, with G1 pneumonitis noted on scan. He is asymptomatic.   - discussed next line of therapy with either SoC docetaxel/ramucirumab vs clinical trial. He currently does not have short term disability insurance and is very concerned about being able to continue working while on clinical trial. Consented for docetaxel/ramucirumab 11/28/23  - started docetaxel/ramucirumab 12/12, labs for toxicity check WNL, no new symptoms as of 2/13/2024.  - CT CAP on 2/29/24 prior to anticipated cycle 5     # G2 pneumonitis-irAE vs radiation pneumonitis- -> recurrent G1 pneumonitis  -asymptomatic, will monitor  -Stopped ipi/nivo    #HTN  - new HTN (likely a side effect of ramicurimab), now improving on  lisinopril 20 mg daily    #Insomnia  - likely shift-related due to alternating work during day and night  - hold PO dexamethasone  - continue melatonin     #Anxiety  -related to diagnosis of lung cancer  -Appreciate SW support     #Psoriasis  -Reports previously diagnosed by dermatology and was previously on Otezla when rash was more diffuse  -Currently on topical steroid, reports minimal benefit. Small rash present on the elbow  - offered dermatology referral at last visit, he deferred.    Patient seen and discussed with attending physician, Dr. Blevins, who agrees with the above.      Kd Franco MD  Heme/Onc Fellow, PGY5

## 2024-02-22 RX ORDER — PHENOL 1.4 %
AEROSOL, SPRAY (ML) MUCOUS MEMBRANE
COMMUNITY

## 2024-02-22 RX ORDER — LOVASTATIN 10 MG/1
TABLET ORAL
COMMUNITY
Start: 2018-06-25 | End: 2024-04-16 | Stop reason: ALTCHOICE

## 2024-02-22 RX ORDER — MONTELUKAST SODIUM 5 MG/1
TABLET, CHEWABLE ORAL
COMMUNITY
Start: 2020-01-15 | End: 2024-04-16 | Stop reason: ALTCHOICE

## 2024-02-22 RX ORDER — HYDROCORTISONE 25 MG/G
CREAM TOPICAL
COMMUNITY
Start: 2020-01-17 | End: 2024-04-16 | Stop reason: ALTCHOICE

## 2024-02-22 RX ORDER — OLMESARTAN MEDOXOMIL AND HYDROCHLOROTHIAZIDE 40/25 40; 25 MG/1; MG/1
TABLET ORAL
COMMUNITY
Start: 2020-08-12 | End: 2024-04-16 | Stop reason: ALTCHOICE

## 2024-02-22 RX ORDER — TRIAMCINOLONE ACETONIDE 1 MG/G
CREAM TOPICAL
COMMUNITY
Start: 2022-01-07 | End: 2024-04-16 | Stop reason: ALTCHOICE

## 2024-02-22 RX ORDER — DESOXIMETASONE 0.5 MG/G
CREAM TOPICAL
COMMUNITY
Start: 2020-01-15 | End: 2024-04-16 | Stop reason: ALTCHOICE

## 2024-02-22 RX ORDER — VARENICLINE TARTRATE 1 MG/1
TABLET, FILM COATED ORAL
COMMUNITY
Start: 2020-01-15 | End: 2024-04-16 | Stop reason: ALTCHOICE

## 2024-02-23 ENCOUNTER — OFFICE VISIT (OUTPATIENT)
Dept: PRIMARY CARE | Facility: CLINIC | Age: 54
End: 2024-02-23
Payer: COMMERCIAL

## 2024-02-23 VITALS
TEMPERATURE: 97.1 F | BODY MASS INDEX: 35.79 KG/M2 | SYSTOLIC BLOOD PRESSURE: 110 MMHG | HEIGHT: 67 IN | DIASTOLIC BLOOD PRESSURE: 80 MMHG | WEIGHT: 228 LBS

## 2024-02-23 DIAGNOSIS — J45.991 COUGH VARIANT ASTHMA (HHS-HCC): ICD-10-CM

## 2024-02-23 DIAGNOSIS — R04.0 NOSEBLEED: ICD-10-CM

## 2024-02-23 DIAGNOSIS — I10 BENIGN ESSENTIAL HYPERTENSION: Primary | ICD-10-CM

## 2024-02-23 PROCEDURE — 3074F SYST BP LT 130 MM HG: CPT | Performed by: PHYSICIAN ASSISTANT

## 2024-02-23 PROCEDURE — 1036F TOBACCO NON-USER: CPT | Performed by: PHYSICIAN ASSISTANT

## 2024-02-23 PROCEDURE — 3079F DIAST BP 80-89 MM HG: CPT | Performed by: PHYSICIAN ASSISTANT

## 2024-02-23 PROCEDURE — 99214 OFFICE O/P EST MOD 30 MIN: CPT | Performed by: PHYSICIAN ASSISTANT

## 2024-02-23 RX ORDER — LISINOPRIL 40 MG/1
40 TABLET ORAL DAILY
Qty: 30 TABLET | Refills: 5 | Status: SHIPPED | OUTPATIENT
Start: 2024-02-23 | End: 2024-08-21

## 2024-02-23 ASSESSMENT — ENCOUNTER SYMPTOMS
CHEST TIGHTNESS: 0
PALPITATIONS: 0
FEVER: 0
FATIGUE: 0
COLOR CHANGE: 0
FREQUENCY: 0
ARTHRALGIAS: 0
DIARRHEA: 0
CONSTIPATION: 0
APPETITE CHANGE: 0
SLEEP DISTURBANCE: 0
EYE PAIN: 0
NUMBNESS: 0
JOINT SWELLING: 0
COUGH: 0
ABDOMINAL DISTENTION: 0
NAUSEA: 0
MYALGIAS: 0
WEAKNESS: 0
NERVOUS/ANXIOUS: 0
ANAL BLEEDING: 0
VOMITING: 0
ABDOMINAL PAIN: 0
TREMORS: 0
POLYDIPSIA: 0
HEADACHES: 0
WHEEZING: 0
SORE THROAT: 0
HEMATURIA: 0
CONFUSION: 0
CHILLS: 0
DIFFICULTY URINATING: 0
FACIAL SWELLING: 0
EYE DISCHARGE: 0
SHORTNESS OF BREATH: 0
DIZZINESS: 0
POLYPHAGIA: 0
CHOKING: 0

## 2024-02-23 ASSESSMENT — PAIN SCALES - GENERAL: PAINLEVEL: 0-NO PAIN

## 2024-02-23 NOTE — PROGRESS NOTES
Subjective   Patient ID: Torres Rivera is a 54 y.o. male with a history of stage IIIc NSCLC right lower lobe lung cancer, hypertension, dyslipidemia, BPH, osteoporosis, is who presents for Epistaxis (Nose Bleed).    HPI The patient is presented with complaints of nosebleed which he developed 10 days ago after his 4th chemotherapy. He reports specks of blood while blowing his nose and couple of times he had spotting but he never had continuous bleeding.  The patient is a  and has heat on all the time.  He denies picking nose.  The patient's blood pressure was not well-controlled and lisinopril was increased to 20 mg. His blood pressure was in between 130-140 mmHg and he increased the dose to 40 mg a week ago. His blood pressure is much better controlled which runs in between 110-120/80 mmHg he denies shortness of breath or chest pain.    Review of Systems   Constitutional:  Negative for appetite change, chills, fatigue and fever.   HENT:  Negative for congestion, ear pain, facial swelling, hearing loss, nosebleeds and sore throat.         Nosebleed   Eyes:  Negative for pain, discharge and visual disturbance.   Respiratory:  Negative for cough, choking, chest tightness, shortness of breath and wheezing.    Cardiovascular:  Negative for chest pain, palpitations and leg swelling.   Gastrointestinal:  Negative for abdominal distention, abdominal pain, anal bleeding, constipation, diarrhea, nausea and vomiting.   Endocrine: Negative for cold intolerance, heat intolerance, polydipsia, polyphagia and polyuria.   Genitourinary:  Negative for difficulty urinating, frequency, hematuria and urgency.   Musculoskeletal:  Negative for arthralgias, gait problem, joint swelling and myalgias.   Skin:  Negative for color change and rash.   Neurological:  Negative for dizziness, tremors, syncope, weakness, numbness and headaches.   Psychiatric/Behavioral:  Negative for behavioral problems, confusion, sleep disturbance and  "suicidal ideas. The patient is not nervous/anxious.        Objective   /80   Temp 36.2 °C (97.1 °F)   Ht 1.702 m (5' 7\")   Wt 103 kg (228 lb)   BMI 35.71 kg/m²     Physical Exam  Constitutional:       General: He is not in acute distress.     Appearance: Normal appearance.   HENT:      Head: Normocephalic and atraumatic.      Nose: Nose normal.      Comments: Erythematous nasal mucosa  Eyes:      Extraocular Movements: Extraocular movements intact.      Conjunctiva/sclera: Conjunctivae normal.      Pupils: Pupils are equal, round, and reactive to light.   Cardiovascular:      Rate and Rhythm: Normal rate and regular rhythm.      Pulses: Normal pulses.      Heart sounds: Normal heart sounds.   Pulmonary:      Effort: Pulmonary effort is normal.      Breath sounds: Normal breath sounds.   Abdominal:      General: Bowel sounds are normal.      Palpations: Abdomen is soft.   Musculoskeletal:         General: Normal range of motion.      Cervical back: Normal range of motion and neck supple.   Neurological:      General: No focal deficit present.      Mental Status: He is alert and oriented to person, place, and time.   Psychiatric:         Mood and Affect: Mood normal.         Behavior: Behavior normal.         Thought Content: Thought content normal.         Judgment: Judgment normal.         Assessment/Plan   Nosebleed  Most likely due to chemotherapy and dry air from heat  Reassurance  If not better, will consider ENT referral    High blood pressure  Better controlled with lisinopril 40 mg  Continue lisinopril 40 mg daily   self stopped Metoprolol  mg due to erectile dysfunction  No added salt diet, do not add salt to your food  Try to exercise every other day for 30 minutes  Stress test done in [facility] September 2022, normal    Stage IIIC NSCLC lung cancer  S/p bronchoscopy 1/11/2023  CT scan 11/07/23 demonstrated increase in size and consolidation within the right lower lobe and increase in size of " the solid left lower lobe nodule which now demonstrates cavitation and multiple new bilateral rounded pulmonary nodules are identified which are also suspicious for progression of disease.   Failed durvalumab due pneumonitis   Off of prednisone   Started chemotherapy   Follow-up with oncology every 3 weeks     Dyslipidemia  Continue with the low fat, low cholesterol diet  I recommend Mediterranean diet, which include fish, chicken, vegetables and olive oil  Exercise daily for 30 minutes at least 3 times a week  Continue Lovastatin 10 mg at bed time  We obtained lipid panel today     Body mass index is 35.71 kg/m².  Elevated BMI, ideal BMI is between 23-26 kg/m2  Low fat, low cholesterol diet, low carbohydrate diet  Exercise at least 30 minutes daily     Health maintenance  Flu vaccine declined  Cologuard 9/27/2021 negative repeat in 3 years    Follow-up in 3 months or sooner if needed

## 2024-02-29 ENCOUNTER — HOSPITAL ENCOUNTER (OUTPATIENT)
Dept: RADIOLOGY | Facility: HOSPITAL | Age: 54
Discharge: HOME | End: 2024-02-29
Payer: COMMERCIAL

## 2024-02-29 DIAGNOSIS — C34.31 PRIMARY SQUAMOUS CELL CARCINOMA OF LOWER LOBE OF RIGHT LUNG (MULTI): ICD-10-CM

## 2024-02-29 PROCEDURE — 74177 CT ABD & PELVIS W/CONTRAST: CPT

## 2024-02-29 PROCEDURE — 2550000001 HC RX 255 CONTRASTS: Performed by: STUDENT IN AN ORGANIZED HEALTH CARE EDUCATION/TRAINING PROGRAM

## 2024-02-29 PROCEDURE — 71260 CT THORAX DX C+: CPT | Performed by: RADIOLOGY

## 2024-02-29 PROCEDURE — 74177 CT ABD & PELVIS W/CONTRAST: CPT | Performed by: RADIOLOGY

## 2024-02-29 RX ADMIN — IOHEXOL 75 ML: 350 INJECTION, SOLUTION INTRAVENOUS at 10:00

## 2024-03-05 ENCOUNTER — LAB (OUTPATIENT)
Dept: LAB | Facility: HOSPITAL | Age: 54
End: 2024-03-05
Payer: COMMERCIAL

## 2024-03-05 ENCOUNTER — INFUSION (OUTPATIENT)
Dept: HEMATOLOGY/ONCOLOGY | Facility: HOSPITAL | Age: 54
End: 2024-03-05
Payer: COMMERCIAL

## 2024-03-05 ENCOUNTER — OFFICE VISIT (OUTPATIENT)
Dept: HEMATOLOGY/ONCOLOGY | Facility: HOSPITAL | Age: 54
End: 2024-03-05
Payer: COMMERCIAL

## 2024-03-05 VITALS
OXYGEN SATURATION: 97 % | SYSTOLIC BLOOD PRESSURE: 133 MMHG | BODY MASS INDEX: 35.35 KG/M2 | WEIGHT: 225.7 LBS | RESPIRATION RATE: 17 BRPM | HEART RATE: 100 BPM | TEMPERATURE: 97 F | DIASTOLIC BLOOD PRESSURE: 83 MMHG

## 2024-03-05 DIAGNOSIS — C34.31 PRIMARY SQUAMOUS CELL CARCINOMA OF LOWER LOBE OF RIGHT LUNG (MULTI): Primary | ICD-10-CM

## 2024-03-05 DIAGNOSIS — C34.31 PRIMARY SQUAMOUS CELL CARCINOMA OF LOWER LOBE OF RIGHT LUNG (MULTI): ICD-10-CM

## 2024-03-05 LAB
ALBUMIN SERPL BCP-MCNC: 3.5 G/DL (ref 3.4–5)
ALP SERPL-CCNC: 60 U/L (ref 33–120)
ALT SERPL W P-5'-P-CCNC: 20 U/L (ref 10–52)
ANION GAP SERPL CALC-SCNC: 13 MMOL/L (ref 10–20)
APPEARANCE UR: CLEAR
AST SERPL W P-5'-P-CCNC: 19 U/L (ref 9–39)
BASOPHILS # BLD AUTO: 0.08 X10*3/UL (ref 0–0.1)
BASOPHILS NFR BLD AUTO: 1.2 %
BILIRUB SERPL-MCNC: 0.4 MG/DL (ref 0–1.2)
BILIRUB UR STRIP.AUTO-MCNC: NEGATIVE MG/DL
BUN SERPL-MCNC: 12 MG/DL (ref 6–23)
CALCIUM SERPL-MCNC: 8.7 MG/DL (ref 8.6–10.3)
CHLORIDE SERPL-SCNC: 108 MMOL/L (ref 98–107)
CO2 SERPL-SCNC: 25 MMOL/L (ref 21–32)
COLOR UR: YELLOW
CREAT SERPL-MCNC: 0.76 MG/DL (ref 0.5–1.3)
EGFRCR SERPLBLD CKD-EPI 2021: >90 ML/MIN/1.73M*2
EOSINOPHIL # BLD AUTO: 0.04 X10*3/UL (ref 0–0.7)
EOSINOPHIL NFR BLD AUTO: 0.6 %
ERYTHROCYTE [DISTWIDTH] IN BLOOD BY AUTOMATED COUNT: 19.3 % (ref 11.5–14.5)
GLUCOSE SERPL-MCNC: 102 MG/DL (ref 74–99)
GLUCOSE UR STRIP.AUTO-MCNC: NORMAL MG/DL
HCT VFR BLD AUTO: 41.5 % (ref 41–52)
HGB BLD-MCNC: 13.6 G/DL (ref 13.5–17.5)
IMM GRANULOCYTES # BLD AUTO: 0.09 X10*3/UL (ref 0–0.7)
IMM GRANULOCYTES NFR BLD AUTO: 1.4 % (ref 0–0.9)
KETONES UR STRIP.AUTO-MCNC: NEGATIVE MG/DL
LEUKOCYTE ESTERASE UR QL STRIP.AUTO: NEGATIVE
LYMPHOCYTES # BLD AUTO: 1.36 X10*3/UL (ref 1.2–4.8)
LYMPHOCYTES NFR BLD AUTO: 20.4 %
MCH RBC QN AUTO: 28.5 PG (ref 26–34)
MCHC RBC AUTO-ENTMCNC: 32.8 G/DL (ref 32–36)
MCV RBC AUTO: 87 FL (ref 80–100)
MONOCYTES # BLD AUTO: 0.77 X10*3/UL (ref 0.1–1)
MONOCYTES NFR BLD AUTO: 11.6 %
NEUTROPHILS # BLD AUTO: 4.32 X10*3/UL (ref 1.2–7.7)
NEUTROPHILS NFR BLD AUTO: 64.8 %
NITRITE UR QL STRIP.AUTO: NEGATIVE
NRBC BLD-RTO: 0 /100 WBCS (ref 0–0)
PH UR STRIP.AUTO: 5.5 [PH]
PLATELET # BLD AUTO: 244 X10*3/UL (ref 150–450)
POTASSIUM SERPL-SCNC: 4.2 MMOL/L (ref 3.5–5.3)
PROT SERPL-MCNC: 5.7 G/DL (ref 6.4–8.2)
PROT UR STRIP.AUTO-MCNC: NEGATIVE MG/DL
RBC # BLD AUTO: 4.78 X10*6/UL (ref 4.5–5.9)
RBC # UR STRIP.AUTO: NEGATIVE /UL
SODIUM SERPL-SCNC: 142 MMOL/L (ref 136–145)
SP GR UR STRIP.AUTO: 1.02
UROBILINOGEN UR STRIP.AUTO-MCNC: NORMAL MG/DL
WBC # BLD AUTO: 6.7 X10*3/UL (ref 4.4–11.3)

## 2024-03-05 PROCEDURE — 3079F DIAST BP 80-89 MM HG: CPT | Performed by: STUDENT IN AN ORGANIZED HEALTH CARE EDUCATION/TRAINING PROGRAM

## 2024-03-05 PROCEDURE — 36415 COLL VENOUS BLD VENIPUNCTURE: CPT

## 2024-03-05 PROCEDURE — 3075F SYST BP GE 130 - 139MM HG: CPT | Performed by: STUDENT IN AN ORGANIZED HEALTH CARE EDUCATION/TRAINING PROGRAM

## 2024-03-05 PROCEDURE — 81003 URINALYSIS AUTO W/O SCOPE: CPT

## 2024-03-05 PROCEDURE — 1036F TOBACCO NON-USER: CPT | Performed by: STUDENT IN AN ORGANIZED HEALTH CARE EDUCATION/TRAINING PROGRAM

## 2024-03-05 PROCEDURE — 99215 OFFICE O/P EST HI 40 MIN: CPT | Performed by: STUDENT IN AN ORGANIZED HEALTH CARE EDUCATION/TRAINING PROGRAM

## 2024-03-05 PROCEDURE — 96417 CHEMO IV INFUS EACH ADDL SEQ: CPT

## 2024-03-05 PROCEDURE — 96413 CHEMO IV INFUSION 1 HR: CPT

## 2024-03-05 PROCEDURE — 2500000001 HC RX 250 WO HCPCS SELF ADMINISTERED DRUGS (ALT 637 FOR MEDICARE OP): Performed by: STUDENT IN AN ORGANIZED HEALTH CARE EDUCATION/TRAINING PROGRAM

## 2024-03-05 PROCEDURE — 2500000004 HC RX 250 GENERAL PHARMACY W/ HCPCS (ALT 636 FOR OP/ED): Performed by: STUDENT IN AN ORGANIZED HEALTH CARE EDUCATION/TRAINING PROGRAM

## 2024-03-05 PROCEDURE — 80053 COMPREHEN METABOLIC PANEL: CPT

## 2024-03-05 PROCEDURE — 85025 COMPLETE CBC W/AUTO DIFF WBC: CPT

## 2024-03-05 PROCEDURE — 96375 TX/PRO/DX INJ NEW DRUG ADDON: CPT | Mod: INF

## 2024-03-05 RX ORDER — ALBUTEROL SULFATE 0.83 MG/ML
3 SOLUTION RESPIRATORY (INHALATION) AS NEEDED
Status: DISCONTINUED | OUTPATIENT
Start: 2024-03-05 | End: 2024-03-05 | Stop reason: HOSPADM

## 2024-03-05 RX ORDER — ALBUTEROL SULFATE 0.83 MG/ML
3 SOLUTION RESPIRATORY (INHALATION) AS NEEDED
Status: CANCELLED | OUTPATIENT
Start: 2024-03-05

## 2024-03-05 RX ORDER — PROCHLORPERAZINE EDISYLATE 5 MG/ML
10 INJECTION INTRAMUSCULAR; INTRAVENOUS EVERY 6 HOURS PRN
Status: CANCELLED | OUTPATIENT
Start: 2024-03-05

## 2024-03-05 RX ORDER — EPINEPHRINE 0.3 MG/.3ML
0.3 INJECTION SUBCUTANEOUS EVERY 5 MIN PRN
Status: DISCONTINUED | OUTPATIENT
Start: 2024-03-05 | End: 2024-03-05 | Stop reason: HOSPADM

## 2024-03-05 RX ORDER — FAMOTIDINE 10 MG/ML
20 INJECTION INTRAVENOUS ONCE AS NEEDED
Status: CANCELLED | OUTPATIENT
Start: 2024-03-05

## 2024-03-05 RX ORDER — PROCHLORPERAZINE MALEATE 10 MG
10 TABLET ORAL EVERY 6 HOURS PRN
Status: CANCELLED | OUTPATIENT
Start: 2024-03-05

## 2024-03-05 RX ORDER — DEXAMETHASONE SODIUM PHOSPHATE 4 MG/ML
10 INJECTION, SOLUTION INTRA-ARTICULAR; INTRALESIONAL; INTRAMUSCULAR; INTRAVENOUS; SOFT TISSUE ONCE
Status: CANCELLED | OUTPATIENT
Start: 2024-03-05

## 2024-03-05 RX ORDER — FAMOTIDINE 10 MG/ML
20 INJECTION INTRAVENOUS ONCE AS NEEDED
Status: DISCONTINUED | OUTPATIENT
Start: 2024-03-05 | End: 2024-03-05 | Stop reason: HOSPADM

## 2024-03-05 RX ORDER — DIPHENHYDRAMINE HCL 50 MG
50 CAPSULE ORAL ONCE
Status: COMPLETED | OUTPATIENT
Start: 2024-03-05 | End: 2024-03-05

## 2024-03-05 RX ORDER — DIPHENHYDRAMINE HCL 50 MG
50 CAPSULE ORAL ONCE
Status: CANCELLED | OUTPATIENT
Start: 2024-03-05

## 2024-03-05 RX ORDER — EPINEPHRINE 0.3 MG/.3ML
0.3 INJECTION SUBCUTANEOUS EVERY 5 MIN PRN
Status: CANCELLED | OUTPATIENT
Start: 2024-03-05

## 2024-03-05 RX ORDER — DIPHENHYDRAMINE HYDROCHLORIDE 50 MG/ML
50 INJECTION INTRAMUSCULAR; INTRAVENOUS AS NEEDED
Status: CANCELLED | OUTPATIENT
Start: 2024-03-05

## 2024-03-05 RX ORDER — PROCHLORPERAZINE EDISYLATE 5 MG/ML
10 INJECTION INTRAMUSCULAR; INTRAVENOUS EVERY 6 HOURS PRN
Status: DISCONTINUED | OUTPATIENT
Start: 2024-03-05 | End: 2024-03-05 | Stop reason: HOSPADM

## 2024-03-05 RX ORDER — PROCHLORPERAZINE MALEATE 10 MG
10 TABLET ORAL EVERY 6 HOURS PRN
Status: DISCONTINUED | OUTPATIENT
Start: 2024-03-05 | End: 2024-03-05 | Stop reason: HOSPADM

## 2024-03-05 RX ORDER — DIPHENHYDRAMINE HYDROCHLORIDE 50 MG/ML
50 INJECTION INTRAMUSCULAR; INTRAVENOUS AS NEEDED
Status: DISCONTINUED | OUTPATIENT
Start: 2024-03-05 | End: 2024-03-05 | Stop reason: HOSPADM

## 2024-03-05 RX ORDER — DEXAMETHASONE SODIUM PHOSPHATE 4 MG/ML
10 INJECTION, SOLUTION INTRA-ARTICULAR; INTRALESIONAL; INTRAMUSCULAR; INTRAVENOUS; SOFT TISSUE ONCE
Status: COMPLETED | OUTPATIENT
Start: 2024-03-05 | End: 2024-03-05

## 2024-03-05 RX ADMIN — SODIUM CHLORIDE 1000 MG: 9 INJECTION, SOLUTION INTRAVENOUS at 10:47

## 2024-03-05 RX ADMIN — DIPHENHYDRAMINE HYDROCHLORIDE 50 MG: 50 CAPSULE ORAL at 10:15

## 2024-03-05 RX ADMIN — DEXAMETHASONE SODIUM PHOSPHATE 10 MG: 4 INJECTION, SOLUTION INTRA-ARTICULAR; INTRALESIONAL; INTRAMUSCULAR; INTRAVENOUS; SOFT TISSUE at 10:15

## 2024-03-05 RX ADMIN — DOCETAXEL ANHYDROUS 131.4 MG: 10 INJECTION, SOLUTION INTRAVENOUS at 11:58

## 2024-03-05 ASSESSMENT — PAIN SCALES - GENERAL: PAINLEVEL: 0-NO PAIN

## 2024-03-05 NOTE — PROGRESS NOTES
[facility] [place] - Medical Oncology Follow-Up Visit     Patient ID: Torres Rivera is a 53 y.o. male      Current therapy:     DOCEtaxeL (Taxotere) 160 mg in dextrose 5 % in water (D5W) 258 mL IV, 75 mg/m2, intravenous, Once, 1 of 17 cycles     ramucirumab (Cyramza) 10 mg/kg = 1,000 mg in sodium chloride 0.9% 250 mL IV, 10 mg/kg, intravenous, Once, 1 of 17 cycles    Oncologic History:   DIAGNOSIS  NSCLC squamous cell ca     STAGING  T3N3M0, now with M1a progression      CURRENT SITES OF DISEASE  RLL, R hilar, subcarinal, supraclavicular, LLL     MOLECULAR GENOMICS  RLL:  PD-L1 <1%  PIK3CA amplification  TP53 splice site ([id number] c.673-1G>T)     LLL:  PD-L1 10%  PIK3CA amplification  TP53 splice site ([id number] c.673-1G>T)      PRIOR THERAPY  concurrent chemoradiation weekly carbo/taxol 2/21/23 - 4/3/23  durvalumab 4/25/23 - 5/9/23 (2 doses)     CURRENT THERAPY  Docetaxel/Ramucirumab 12/12/2024 2- present     CURRENT ONCOLOGICAL PROBLEMS     HISTORY OF PRESENT ILLNESS  Torres Rivera is a 53 yo M PMHx tobacco use disorder presenting for a followup visit for his 4th cycle of docetaxel/ramicurimab.     Onc History     Pt presented to [facility] on 12/31/22 for acute chest pain. CT PE showed a mass like opacity in the RLL 5.9x4.3x2.5cm with subcarinal and R hilar adenopathy and postobstructive pneumonia. Mild interlobular septal thickening at the R lung base  was thought possibly due to lymphangitic carcinomatosis. He was prescribed antibiotics and prednisone. His chest pain resolved after a few days. He otherwise reports he had felt in good health. Denied new SOB, cough, weight loss, fatigue, change in appetite,  fever or chills. He has stable chronic SOB from smoking. He underwent bronchoscopy with biopsy on 1/11/23 at  with pathology revealing squamous cell ca of station 7, NGS without targetable mutation,  insufficient tissue for PD-L1. PET/CT revealed FDG-avidity of R  supclavicular node as well. Biopsy of the supraclavicular node positive for squamous cell ca. Offered concurrent chemoradiation with weekly carbo/taxol which he started on 2/21/23 and ended  4/3/23. He started durvalumab 4/25/23. Course c/b pneumonitis, and durvalumab held after 2 doses (5/2023). Treated with steroids with improvement. Restaging scans unfortunately with concern for new LLL nodule, and PET/CT 8/2023 with FDG avidity of new  lesion. Biopsy done 9/22/23, with pathology consistent with squamous cell ca, same PIK3CA and TP53 mutations. He is not interested in any further radiation and strongly prefers immunotherapy. Discussed risk of recurrent pneumonitis, which he understands. Plan to move forward with ipi/nivo which started 10/17/23. Unfortunately he had a scan after one cycle, with G1 asymptomatic pneumonitis, and possible progression. He agreed to docetaxel/ramucirumab as next line - is interested in clinical trial but cannot take off from work and is worried about finances.        PAST MEDICAL HISTORY  HTN  Tobacco use disorder   BPH  HLD  Osteoporosis   Psoriasis      SOCIAL HISTORY  Home: lives with his wife   Work:   Tobacco: quit 1/2023. 1PPD x 30 yrs   Alcohol: ~4 drinks/week  Drugs: none    Meds (Current):     Current Outpatient Medications on File Prior to Visit   Medication Sig Dispense Refill    dexAMETHasone (Decadron) 4 mg tablet Take 4 mg (1 tablet) twice daily for 3 days starting the day BEFORE treatment. 12 tablet 3    ondansetron (Zofran) 8 mg tablet Take 1 tablet (8 mg) by mouth every 8 hours if needed for nausea or vomiting. 30 tablet 5    prochlorperazine (Compazine) 10 mg tablet Take 1 tablet (10 mg) by mouth every 6 hours if needed for nausea or vomiting. 30 tablet 5    rosuvastatin (Crestor) 10 mg tablet Take 1 tablet (10 mg) by mouth once daily. 90 tablet 3    Ventolin HFA 90 mcg/actuation inhaler Inhale.      lisinopril 5 mg tablet Take 1 tablet (5 mg) by mouth  once daily. 90 tablet 2        ALLERGIES  NKDA     FAMILY HISTORY   No family history of lung cancer      Chief Concern: Here in clinic for follow-up and treatment with docetaxel/ramicurumab      HPI   Patient is seen in clinic today with his wife. He was really exhausted for 2 weeks after the last cycle of treatment. Could only be up doing things for 10 minutes at a time. This last week is better. He also has a dry cough, his wife feels it is worse than it was before, he doesn't feel like it is. Breathing is ok.        Review of Systems - Oncology 12 point ROS was obtained and negative unless otherwise mentioned in the above HPI.       Objective   Visit Vitals  /83 (BP Location: Left arm, Patient Position: Sitting)   Pulse 100   Temp 36.1 °C (97 °F) (Temporal)   Resp 17   Wt 102 kg (225 lb 11.2 oz)   SpO2 97%   BMI 35.35 kg/m²   Smoking Status Former   BSA 2.2 m²        Results for orders placed or performed in visit on 03/05/24 (from the past 24 hour(s))   CBC and Auto Differential   Result Value Ref Range    WBC 6.7 4.4 - 11.3 x10*3/uL    nRBC 0.0 0.0 - 0.0 /100 WBCs    RBC 4.78 4.50 - 5.90 x10*6/uL    Hemoglobin 13.6 13.5 - 17.5 g/dL    Hematocrit 41.5 41.0 - 52.0 %    MCV 87 80 - 100 fL    MCH 28.5 26.0 - 34.0 pg    MCHC 32.8 32.0 - 36.0 g/dL    RDW 19.3 (H) 11.5 - 14.5 %    Platelets 244 150 - 450 x10*3/uL    Neutrophils % 64.8 40.0 - 80.0 %    Immature Granulocytes %, Automated 1.4 (H) 0.0 - 0.9 %    Lymphocytes % 20.4 13.0 - 44.0 %    Monocytes % 11.6 2.0 - 10.0 %    Eosinophils % 0.6 0.0 - 6.0 %    Basophils % 1.2 0.0 - 2.0 %    Neutrophils Absolute 4.32 1.20 - 7.70 x10*3/uL    Immature Granulocytes Absolute, Automated 0.09 0.00 - 0.70 x10*3/uL    Lymphocytes Absolute 1.36 1.20 - 4.80 x10*3/uL    Monocytes Absolute 0.77 0.10 - 1.00 x10*3/uL    Eosinophils Absolute 0.04 0.00 - 0.70 x10*3/uL    Basophils Absolute 0.08 0.00 - 0.10 x10*3/uL   Comprehensive metabolic panel   Result Value Ref Range     Glucose 102 (H) 74 - 99 mg/dL    Sodium 142 136 - 145 mmol/L    Potassium 4.2 3.5 - 5.3 mmol/L    Chloride 108 (H) 98 - 107 mmol/L    Bicarbonate 25 21 - 32 mmol/L    Anion Gap 13 10 - 20 mmol/L    Urea Nitrogen 12 6 - 23 mg/dL    Creatinine 0.76 0.50 - 1.30 mg/dL    eGFR >90 >60 mL/min/1.73m*2    Calcium 8.7 8.6 - 10.3 mg/dL    Albumin 3.5 3.4 - 5.0 g/dL    Alkaline Phosphatase 60 33 - 120 U/L    Total Protein 5.7 (L) 6.4 - 8.2 g/dL    AST 19 9 - 39 U/L    Bilirubin, Total 0.4 0.0 - 1.2 mg/dL    ALT 20 10 - 52 U/L           ECOG Score: 0- Fully active, able to carry on all pre-disease performance w/o restriction.      Physical Exam  Constitutional:       Appearance: Normal appearance.   Eyes:      Pupils: Pupils are equal, round, and reactive to light.   Cardiovascular:      Rate and Rhythm: Normal rate and regular rhythm.   Pulmonary:      Effort: Pulmonary effort is normal.      Breath sounds: Normal breath sounds.   Abdominal:      General: Bowel sounds are normal.      Palpations: Abdomen is soft.   Musculoskeletal:         General: Normal range of motion.   Skin:     General: Skin is warm and dry.   Neurological:      General: No focal deficit present.      Mental Status: He is alert and oriented to person, place, and time.   Psychiatric:         Mood and Affect: Mood normal.         Behavior: Behavior normal.            Results:       Imaging:   I have personally reviewed the below imaging and concur with the reported findings unless otherwise stated:    CT chest abdomen pelvis w IV contrast    Result Date: 2/29/2024  Impression: Lung cancer restaging scan compared to CT chest dated 11/24/2023 and PET-CT dated 08/28/2023: 1. Decreased extent of local and metastatic disease as evidenced by decreased size of the primary right lower lobe perihilar pulmonary lesion, the left lower lobe cavitary lesion, and several additional pulmonary nodules. Additionally, there is decreased size of a partially calcified  subcarinal node and several additional posterior mediastinal and upper abdominal nodes. No regions of new metastatic disease identified. 2. Stable small right pleural effusion. 3. Small bilateral hydroceles.   I personally reviewed the image(s)/study and resident interpretation as stated by Dr. Maritza Castano MD. I agree with the findings as stated. This study was interpreted at [facility], [place], [place].   MACRO: None.   Signed by: Dinesh Mabry 2/29/2024 5:39 PM Dictation workstation:   [id number]           Assessment/Plan   Torres Rivera is a 53 y.o. male here for follow up.      Torres Rivera is a 53 y.o. male with PMHx HTN, psoriasis, and tobacco use who presented with NSCLC squamous cell ca, no targetable mutations, neg PD-L1 who is s/p concurrent chemoradiation with weekly carbo/taxol then progression after holding durvalumab due to G2 pneumonitis. Had one cycle of ipi/nivo, but developed pneumonitis. He is now presenting to the clinic for cycle 6 of docetaxel/ramicurimab.    #NSCLC squamous cell ca  - T3N3M0, Stage IIIC originally, now with M1a progression  - PD-L1 neg, no targetable mutations  - supraclavicular node biopsy-proven squamous cell ca  - met Dr. Lew and discussed concurrent chemoradiation  - he has concerns about radiation and treatment in general   - discussed if we move forward with curative-intent concurrent chemoradiation, would receive weekly carbo/taxol. discussed palliative treatment with combination chemo-immunotherapy would be the non-radiation option, unfortunately surgery is not a viable  option.  - discussed potential side effects of carbo/taxol including but not limited to allergic reaction, nausea/vomiting, diarrhea/constipation, fatigue, injury to liver/kidney, risk of infection, anemia/thrombocytopenia. consented 2/7/23  - C1D1 concurrent chemoRT with weekly carbo/taxol 2/21/23  - discussed a year of durvalumab afterwards  broadly  - 3/28/23 Carbo/Taxol held for thrombocytopenia (66), given IVF in infusion for decreased fluid intake/tachycardia  -last radiation 4/3/23; last Carbo/Taxol 3/21/23  - consented for 1 year durvalumab 4/25/23 and received two doses  - CT scan concerning for pneumonitis in view of worsening SOB, prednisone initiated at 1mg/kg/day (90mg) as well as PPI/Bactrim prophylactically, completed steroid taper with resolution of symptoms.   - personally reviewed PET/CT with FDG-avidity  of LLL lesion and possible abdominal lymph node; will obtain biopsy of LLL lesion  - personally reviewed brain MRI without evidence of disease  - discussed results of biopsy of LLL nodule, with same histology and biomarkers. PD-L1 10%. Discussed likelihood that this is M1a progression. He remains uninterested in any further radiation and strongly would like to pursue immunotherapy. Discussed potential of recurrent pneumonitis. Discussed clinical trial option, which he is currently also not interested in.  - Full NGS tempus on tissue - unfortunately insufficient tissue from most recent biopsy and original biopsy  - liquid NGS without targetable mutation  - Previously discussed ipi/nivo and risks, particularly risk of recurrent pneumonitis. Consented 10/17/23 and C1D1 10/17/23. Had unexpected restaging scan after C1, with G1 pneumonitis noted on scan. He is asymptomatic.   - discussed next line of therapy with either SoC docetaxel/ramucirumab vs clinical trial. He currently does not have short term disability insurance and is very concerned about being able to continue working while on clinical trial. Consented for docetaxel/ramucirumab 11/28/23  - started docetaxel/ramucirumab 12/12/23  - personally reviewed restaging scans with evidence of partial response  - C4 was complicated by G2 fatigue, and will dose reduce docetaxel 20% for fatigue  - next restaging scans in 3 months (end of May, not yet ordered)  - RTC 3 weeks for next cycle      # G2 pneumonitis-irAE vs radiation pneumonitis- -> recurrent G1 pneumonitis  -asymptomatic, will monitor  -Stopped ipi/nivo    #HTN  - new HTN (likely a side effect of ramicurimab), now improving on lisinopril 20 mg daily    #Insomnia  - likely shift-related due to alternating work during day and night  - hold PO dexamethasone  - continue melatonin     #Anxiety  -related to diagnosis of lung cancer  -Appreciate SW support     #Psoriasis  -Reports previously diagnosed by dermatology and was previously on Otezla when rash was more diffuse  -Currently on topical steroid, reports minimal benefit. Small rash present on the elbow  - offered dermatology referral at last visit, he deferred.

## 2024-03-05 NOTE — PROGRESS NOTES
-Patient presents to the clinic today for cycle 6 diane+docetaxel  -Patient tolerated infusion well  -Patient discharged in stable condition. Reviewed calendar/next appointment, all questions answered.   -Patient ambulated out of clinic with ease  -Notes/Plan for next visit-     -Dose reduction in docetaxel due to extreme fatigue, worse 1 week after chemo, not able to work and increasing in severity and length with each cycle.

## 2024-03-14 ENCOUNTER — TELEPHONE (OUTPATIENT)
Dept: HEMATOLOGY/ONCOLOGY | Facility: HOSPITAL | Age: 54
End: 2024-03-14
Payer: COMMERCIAL

## 2024-03-14 NOTE — TELEPHONE ENCOUNTER
Patient calls to obtain fax number to send [place] paperwork. Advised patient to send to [phone] ATTN: Dr. Blevins.  Pt verbalized understanding with teach back. They will try to send paperwork today or tomorrow. Once received, he would request a call back advising when they can  the paperwork.  Message sent to team.

## 2024-03-22 ENCOUNTER — SOCIAL WORK (OUTPATIENT)
Dept: CASE MANAGEMENT | Facility: HOSPITAL | Age: 54
End: 2024-03-22
Payer: COMMERCIAL

## 2024-03-22 NOTE — PROGRESS NOTES
JALEEL covering for this SW faxed signed and completed FMLA application on pt's behalf on 03/19/24.

## 2024-03-26 ENCOUNTER — OFFICE VISIT (OUTPATIENT)
Dept: HEMATOLOGY/ONCOLOGY | Facility: HOSPITAL | Age: 54
End: 2024-03-26
Payer: COMMERCIAL

## 2024-03-26 ENCOUNTER — TELEPHONE (OUTPATIENT)
Dept: ADMISSION | Facility: HOSPITAL | Age: 54
End: 2024-03-26

## 2024-03-26 ENCOUNTER — INFUSION (OUTPATIENT)
Dept: HEMATOLOGY/ONCOLOGY | Facility: HOSPITAL | Age: 54
End: 2024-03-26
Payer: COMMERCIAL

## 2024-03-26 ENCOUNTER — LAB (OUTPATIENT)
Dept: LAB | Facility: HOSPITAL | Age: 54
End: 2024-03-26
Payer: COMMERCIAL

## 2024-03-26 VITALS
TEMPERATURE: 96.6 F | DIASTOLIC BLOOD PRESSURE: 70 MMHG | RESPIRATION RATE: 18 BRPM | OXYGEN SATURATION: 96 % | SYSTOLIC BLOOD PRESSURE: 102 MMHG | BODY MASS INDEX: 35.1 KG/M2 | WEIGHT: 224.1 LBS | HEART RATE: 97 BPM

## 2024-03-26 DIAGNOSIS — C34.31 PRIMARY SQUAMOUS CELL CARCINOMA OF LOWER LOBE OF RIGHT LUNG (MULTI): ICD-10-CM

## 2024-03-26 DIAGNOSIS — R60.0 EDEMA OF BOTH LOWER LEGS: Primary | ICD-10-CM

## 2024-03-26 LAB
ALBUMIN SERPL BCP-MCNC: 3.5 G/DL (ref 3.4–5)
ALP SERPL-CCNC: 54 U/L (ref 33–120)
ALT SERPL W P-5'-P-CCNC: 24 U/L (ref 10–52)
ANION GAP SERPL CALC-SCNC: 11 MMOL/L (ref 10–20)
APPEARANCE UR: CLEAR
AST SERPL W P-5'-P-CCNC: 22 U/L (ref 9–39)
BASOPHILS # BLD AUTO: 0.07 X10*3/UL (ref 0–0.1)
BASOPHILS NFR BLD AUTO: 1 %
BILIRUB SERPL-MCNC: 0.4 MG/DL (ref 0–1.2)
BILIRUB UR STRIP.AUTO-MCNC: NEGATIVE MG/DL
BUN SERPL-MCNC: 15 MG/DL (ref 6–23)
CALCIUM SERPL-MCNC: 8.6 MG/DL (ref 8.6–10.3)
CHLORIDE SERPL-SCNC: 108 MMOL/L (ref 98–107)
CO2 SERPL-SCNC: 23 MMOL/L (ref 21–32)
COLOR UR: YELLOW
CREAT SERPL-MCNC: 0.81 MG/DL (ref 0.5–1.3)
DACRYOCYTES BLD QL SMEAR: NORMAL
EGFRCR SERPLBLD CKD-EPI 2021: >90 ML/MIN/1.73M*2
EOSINOPHIL # BLD AUTO: 0.01 X10*3/UL (ref 0–0.7)
EOSINOPHIL NFR BLD AUTO: 0.1 %
ERYTHROCYTE [DISTWIDTH] IN BLOOD BY AUTOMATED COUNT: 20.5 % (ref 11.5–14.5)
GLUCOSE SERPL-MCNC: 112 MG/DL (ref 74–99)
GLUCOSE UR STRIP.AUTO-MCNC: NORMAL MG/DL
HCT VFR BLD AUTO: 40.9 % (ref 41–52)
HGB BLD-MCNC: 13.3 G/DL (ref 13.5–17.5)
IMM GRANULOCYTES # BLD AUTO: 0.06 X10*3/UL (ref 0–0.7)
IMM GRANULOCYTES NFR BLD AUTO: 0.9 % (ref 0–0.9)
KETONES UR STRIP.AUTO-MCNC: NEGATIVE MG/DL
LEUKOCYTE ESTERASE UR QL STRIP.AUTO: NEGATIVE
LYMPHOCYTES # BLD AUTO: 1.19 X10*3/UL (ref 1.2–4.8)
LYMPHOCYTES NFR BLD AUTO: 17.6 %
MCH RBC QN AUTO: 28.4 PG (ref 26–34)
MCHC RBC AUTO-ENTMCNC: 32.5 G/DL (ref 32–36)
MCV RBC AUTO: 87 FL (ref 80–100)
MONOCYTES # BLD AUTO: 1.01 X10*3/UL (ref 0.1–1)
MONOCYTES NFR BLD AUTO: 14.9 %
NEUTROPHILS # BLD AUTO: 4.42 X10*3/UL (ref 1.2–7.7)
NEUTROPHILS NFR BLD AUTO: 65.5 %
NITRITE UR QL STRIP.AUTO: NEGATIVE
NRBC BLD-RTO: 0 /100 WBCS (ref 0–0)
OVALOCYTES BLD QL SMEAR: NORMAL
PH UR STRIP.AUTO: 5.5 [PH]
PLATELET # BLD AUTO: 233 X10*3/UL (ref 150–450)
POLYCHROMASIA BLD QL SMEAR: NORMAL
POTASSIUM SERPL-SCNC: 4.2 MMOL/L (ref 3.5–5.3)
PROT SERPL-MCNC: 5.5 G/DL (ref 6.4–8.2)
PROT UR STRIP.AUTO-MCNC: NEGATIVE MG/DL
RBC # BLD AUTO: 4.68 X10*6/UL (ref 4.5–5.9)
RBC # UR STRIP.AUTO: NEGATIVE /UL
RBC MORPH BLD: NORMAL
SODIUM SERPL-SCNC: 138 MMOL/L (ref 136–145)
SP GR UR STRIP.AUTO: 1.02
T4 FREE SERPL-MCNC: 1.19 NG/DL (ref 0.78–1.48)
TSH SERPL-ACNC: 4.35 MIU/L (ref 0.44–3.98)
UROBILINOGEN UR STRIP.AUTO-MCNC: NORMAL MG/DL
WBC # BLD AUTO: 6.8 X10*3/UL (ref 4.4–11.3)

## 2024-03-26 PROCEDURE — 96375 TX/PRO/DX INJ NEW DRUG ADDON: CPT | Mod: INF

## 2024-03-26 PROCEDURE — 1036F TOBACCO NON-USER: CPT | Performed by: NURSE PRACTITIONER

## 2024-03-26 PROCEDURE — 84443 ASSAY THYROID STIM HORMONE: CPT

## 2024-03-26 PROCEDURE — 3078F DIAST BP <80 MM HG: CPT | Performed by: NURSE PRACTITIONER

## 2024-03-26 PROCEDURE — 81003 URINALYSIS AUTO W/O SCOPE: CPT

## 2024-03-26 PROCEDURE — 3074F SYST BP LT 130 MM HG: CPT | Performed by: NURSE PRACTITIONER

## 2024-03-26 PROCEDURE — 99214 OFFICE O/P EST MOD 30 MIN: CPT | Performed by: NURSE PRACTITIONER

## 2024-03-26 PROCEDURE — 36415 COLL VENOUS BLD VENIPUNCTURE: CPT

## 2024-03-26 PROCEDURE — 96417 CHEMO IV INFUS EACH ADDL SEQ: CPT

## 2024-03-26 PROCEDURE — 2500000001 HC RX 250 WO HCPCS SELF ADMINISTERED DRUGS (ALT 637 FOR MEDICARE OP): Performed by: STUDENT IN AN ORGANIZED HEALTH CARE EDUCATION/TRAINING PROGRAM

## 2024-03-26 PROCEDURE — 80053 COMPREHEN METABOLIC PANEL: CPT

## 2024-03-26 PROCEDURE — 96413 CHEMO IV INFUSION 1 HR: CPT

## 2024-03-26 PROCEDURE — 2500000004 HC RX 250 GENERAL PHARMACY W/ HCPCS (ALT 636 FOR OP/ED): Performed by: STUDENT IN AN ORGANIZED HEALTH CARE EDUCATION/TRAINING PROGRAM

## 2024-03-26 PROCEDURE — 85025 COMPLETE CBC W/AUTO DIFF WBC: CPT

## 2024-03-26 PROCEDURE — 84439 ASSAY OF FREE THYROXINE: CPT

## 2024-03-26 RX ORDER — DIPHENHYDRAMINE HCL 50 MG
50 CAPSULE ORAL ONCE
Status: CANCELLED | OUTPATIENT
Start: 2024-03-26

## 2024-03-26 RX ORDER — PROCHLORPERAZINE MALEATE 10 MG
10 TABLET ORAL EVERY 6 HOURS PRN
Status: CANCELLED | OUTPATIENT
Start: 2024-03-26

## 2024-03-26 RX ORDER — PROCHLORPERAZINE EDISYLATE 5 MG/ML
10 INJECTION INTRAMUSCULAR; INTRAVENOUS EVERY 6 HOURS PRN
Status: DISCONTINUED | OUTPATIENT
Start: 2024-03-26 | End: 2024-03-26 | Stop reason: HOSPADM

## 2024-03-26 RX ORDER — HEPARIN SODIUM,PORCINE/PF 10 UNIT/ML
50 SYRINGE (ML) INTRAVENOUS AS NEEDED
Status: CANCELLED | OUTPATIENT
Start: 2024-03-26

## 2024-03-26 RX ORDER — EPINEPHRINE 0.3 MG/.3ML
0.3 INJECTION SUBCUTANEOUS EVERY 5 MIN PRN
Status: DISCONTINUED | OUTPATIENT
Start: 2024-03-26 | End: 2024-03-26 | Stop reason: HOSPADM

## 2024-03-26 RX ORDER — DIPHENHYDRAMINE HYDROCHLORIDE 50 MG/ML
50 INJECTION INTRAMUSCULAR; INTRAVENOUS AS NEEDED
Status: CANCELLED | OUTPATIENT
Start: 2024-03-26

## 2024-03-26 RX ORDER — DIPHENHYDRAMINE HCL 50 MG
50 CAPSULE ORAL ONCE
Status: COMPLETED | OUTPATIENT
Start: 2024-03-26 | End: 2024-03-26

## 2024-03-26 RX ORDER — DIPHENHYDRAMINE HYDROCHLORIDE 50 MG/ML
50 INJECTION INTRAMUSCULAR; INTRAVENOUS AS NEEDED
Status: DISCONTINUED | OUTPATIENT
Start: 2024-03-26 | End: 2024-03-26 | Stop reason: HOSPADM

## 2024-03-26 RX ORDER — ALBUTEROL SULFATE 0.83 MG/ML
3 SOLUTION RESPIRATORY (INHALATION) AS NEEDED
Status: DISCONTINUED | OUTPATIENT
Start: 2024-03-26 | End: 2024-03-26 | Stop reason: HOSPADM

## 2024-03-26 RX ORDER — HEPARIN 100 UNIT/ML
500 SYRINGE INTRAVENOUS AS NEEDED
Status: CANCELLED | OUTPATIENT
Start: 2024-03-26

## 2024-03-26 RX ORDER — FAMOTIDINE 10 MG/ML
20 INJECTION INTRAVENOUS ONCE AS NEEDED
Status: CANCELLED | OUTPATIENT
Start: 2024-03-26

## 2024-03-26 RX ORDER — PROCHLORPERAZINE EDISYLATE 5 MG/ML
10 INJECTION INTRAMUSCULAR; INTRAVENOUS EVERY 6 HOURS PRN
Status: CANCELLED | OUTPATIENT
Start: 2024-03-26

## 2024-03-26 RX ORDER — FAMOTIDINE 10 MG/ML
20 INJECTION INTRAVENOUS ONCE AS NEEDED
Status: DISCONTINUED | OUTPATIENT
Start: 2024-03-26 | End: 2024-03-26 | Stop reason: HOSPADM

## 2024-03-26 RX ORDER — EPINEPHRINE 0.3 MG/.3ML
0.3 INJECTION SUBCUTANEOUS EVERY 5 MIN PRN
Status: CANCELLED | OUTPATIENT
Start: 2024-03-26

## 2024-03-26 RX ORDER — HEPARIN SODIUM 1000 [USP'U]/ML
2000 INJECTION, SOLUTION INTRAVENOUS; SUBCUTANEOUS AS NEEDED
Status: CANCELLED | OUTPATIENT
Start: 2024-03-26

## 2024-03-26 RX ORDER — ALBUTEROL SULFATE 0.83 MG/ML
3 SOLUTION RESPIRATORY (INHALATION) AS NEEDED
Status: CANCELLED | OUTPATIENT
Start: 2024-03-26

## 2024-03-26 RX ORDER — PROCHLORPERAZINE MALEATE 10 MG
10 TABLET ORAL EVERY 6 HOURS PRN
Status: DISCONTINUED | OUTPATIENT
Start: 2024-03-26 | End: 2024-03-26 | Stop reason: HOSPADM

## 2024-03-26 RX ADMIN — SODIUM CHLORIDE 1000 MG: 9 INJECTION, SOLUTION INTRAVENOUS at 10:58

## 2024-03-26 RX ADMIN — DEXAMETHASONE SODIUM PHOSPHATE 10 MG: 4 INJECTION, SOLUTION INTRA-ARTICULAR; INTRALESIONAL; INTRAMUSCULAR; INTRAVENOUS; SOFT TISSUE at 10:32

## 2024-03-26 RX ADMIN — DOCETAXEL ANHYDROUS 109.5 MG: 10 INJECTION, SOLUTION INTRAVENOUS at 12:15

## 2024-03-26 RX ADMIN — DIPHENHYDRAMINE HYDROCHLORIDE 50 MG: 50 CAPSULE ORAL at 10:32

## 2024-03-26 ASSESSMENT — ENCOUNTER SYMPTOMS
OCCASIONAL FEELINGS OF UNSTEADINESS: 0
DEPRESSION: 0
LOSS OF SENSATION IN FEET: 0

## 2024-03-26 ASSESSMENT — PAIN SCALES - GENERAL: PAINLEVEL: 0-NO PAIN

## 2024-03-26 NOTE — PROGRESS NOTES
Torres Rivera is a 54 y.o. male who presents for Chemotherapy.    He is on the following chemotherapy regimen:     Treatment Plans       Name Type Plan Dates Plan Provider         Active    Ramucirumab + DOCEtaxel, 21 Day Cycles Oncology Treatment  11/28/2023 - Present Belia Blevins MD                  .    Since his last visit, he has been doing fair.  Overall, he states his energy level is worsening.  His appetite has been good.  He reports severe fatigue.  He has no other concerns.    PIV inserted without any difficulty.  Patient tolerated infusion.  IV removed and pressure dressing applied.  Patient discharged home with family in stable condition.

## 2024-03-26 NOTE — TELEPHONE ENCOUNTER
No more needed from the team since patient told me he is now at the desk area to check about paper work.

## 2024-03-26 NOTE — TELEPHONE ENCOUNTER
Torres called nurse triage line and gave Patricia Braun paperwork this morning and was supposed to  after chemo appt. He is done with chemo now and wanted to know if ready to ?  Secured chat Patricia Braun. Messaged team.

## 2024-03-26 NOTE — PROGRESS NOTES
[facility] - Medical Oncology Follow-Up Visit     Patient ID: Torres Rivera is a 53 y.o. male      Current therapy:     DOCEtaxeL (Taxotere) 160 mg in dextrose 5 % in water (D5W) 258 mL IV, 75 mg/m2, intravenous, Once, 1 of 17 cycles     ramucirumab (Cyramza) 10 mg/kg = 1,000 mg in sodium chloride 0.9% 250 mL IV, 10 mg/kg, intravenous, Once, 1 of 17 cycles    Oncologic History:   DIAGNOSIS  NSCLC squamous cell ca     STAGING  T3N3M0, now with M1a progression      CURRENT SITES OF DISEASE  RLL, R hilar, subcarinal, supraclavicular, LLL     MOLECULAR GENOMICS  RLL:  PD-L1 <1%  PIK3CA amplification  TP53 splice site ([id number] c.673-1G>T)     LLL:  PD-L1 10%  PIK3CA amplification  TP53 splice site ([id number] c.673-1G>T)      PRIOR THERAPY  concurrent chemoradiation weekly carbo/taxol 2/21/23 - 4/3/23  durvalumab 4/25/23 - 5/9/23 (2 doses)     CURRENT THERAPY  Docetaxel/Ramucirumab 12/12/2024 2- present     CURRENT ONCOLOGICAL PROBLEMS     HISTORY OF PRESENT ILLNESS  Torres Rivera is a 53 yo M PMHx tobacco use disorder presenting for a followup visit for his 4th cycle of docetaxel/ramicurimab.     Onc History     Pt presented to [facility] on 12/31/22 for acute chest pain. CT PE showed a mass like opacity in the RLL 5.9x4.3x2.5cm with subcarinal and R hilar adenopathy and postobstructive pneumonia. Mild interlobular septal thickening at the R lung base  was thought possibly due to lymphangitic carcinomatosis. He was prescribed antibiotics and prednisone. His chest pain resolved after a few days. He otherwise reports he had felt in good health. Denied new SOB, cough, weight loss, fatigue, change in appetite,  fever or chills. He has stable chronic SOB from smoking. He underwent bronchoscopy with biopsy on 1/11/23 at  with pathology revealing squamous cell ca of station 7, NGS without targetable mutation,  insufficient tissue for PD-L1. PET/CT revealed FDG-avidity of R  supclavicular node as well. Biopsy of the supraclavicular node positive for squamous cell ca. Offered concurrent chemoradiation with weekly carbo/taxol which he started on 2/21/23 and ended  4/3/23. He started durvalumab 4/25/23. Course c/b pneumonitis, and durvalumab held after 2 doses (5/2023). Treated with steroids with improvement. Restaging scans unfortunately with concern for new LLL nodule, and PET/CT 8/2023 with FDG avidity of new  lesion. Biopsy done 9/22/23, with pathology consistent with squamous cell ca, same PIK3CA and TP53 mutations. He is not interested in any further radiation and strongly prefers immunotherapy. Discussed risk of recurrent pneumonitis, which he understands. Plan to move forward with ipi/nivo which started 10/17/23. Unfortunately he had a scan after one cycle, with G1 asymptomatic pneumonitis, and possible progression. He agreed to docetaxel/ramucirumab as next line - is interested in clinical trial but cannot take off from work and is worried about finances.        PAST MEDICAL HISTORY  HTN  Tobacco use disorder   BPH  HLD  Osteoporosis   Psoriasis      SOCIAL HISTORY  Home: lives with his wife   Work:   Tobacco: quit 1/2023. 1PPD x 30 yrs   Alcohol: ~4 drinks/week  Drugs: none    Meds (Current):     Current Outpatient Medications on File Prior to Visit   Medication Sig Dispense Refill    dexAMETHasone (Decadron) 4 mg tablet Take 4 mg (1 tablet) twice daily for 3 days starting the day BEFORE treatment. 12 tablet 3    ondansetron (Zofran) 8 mg tablet Take 1 tablet (8 mg) by mouth every 8 hours if needed for nausea or vomiting. 30 tablet 5    prochlorperazine (Compazine) 10 mg tablet Take 1 tablet (10 mg) by mouth every 6 hours if needed for nausea or vomiting. 30 tablet 5    rosuvastatin (Crestor) 10 mg tablet Take 1 tablet (10 mg) by mouth once daily. 90 tablet 3    Ventolin HFA 90 mcg/actuation inhaler Inhale.      lisinopril 5 mg tablet Take 1 tablet (5 mg) by mouth  once daily. 90 tablet 2        ALLERGIES  NKDA     FAMILY HISTORY   No family history of lung cancer      Chief Concern: Here in clinic for follow-up and treatment with docetaxel/ramicurumab      HPI:  Patient is here today with his wife for follow-up and treatment. Endorses moderate fatigue s/p last treatment that lasted 2 weeks. Will discuss with Dr. Blevins regarding dose reduction for this cycle and also obtain TSH. Feels well for treatment today. Breathing stable, no new respiratory symptoms. Reports ongoing watery eyes and nose, no fevers, chills, cough or any other associated signs/symptoms, discussed trial of OTC Zyrtec and he is agreeable. Denies any pain. Appetite is good, states he is eating smaller portions. Denies N/V/C, has occasional diarrhea after treatment, resolves without intervention. BLE edema noted, no pain or erythema, states this has been ongoing for the past 2-3 weeks, he is a  and drives for long periods of time, discussed obtaining duplex US of BLE and he is agreeable, will order.     LAST VISIT:  Patient is seen in clinic today with his wife. He was really exhausted for 2 weeks after the last cycle of treatment. Could only be up doing things for 10 minutes at a time. This last week is better. He also has a dry cough, his wife feels it is worse than it was before, he doesn't feel like it is. Breathing is ok.        Review of Systems - Oncology 12 point ROS was obtained and negative unless otherwise mentioned in the above HPI.       Objective   Visit Vitals  /70 (BP Location: Left arm, Patient Position: Sitting, BP Cuff Size: Large adult)   Pulse 97   Temp 35.9 °C (96.6 °F) (Skin)   Resp 18   Wt 102 kg (224 lb 1.6 oz)   SpO2 96%   BMI 35.10 kg/m²   Smoking Status Former   BSA 2.2 m²        No results found for this or any previous visit (from the past 24 hour(s)).          ECOG Score: 0- Fully active, able to carry on all pre-disease performance w/o restriction.      Physical  Exam  Constitutional:       General: He is not in acute distress.     Appearance: He is not toxic-appearing.   Eyes:      Extraocular Movements: Extraocular movements intact.      Conjunctiva/sclera: Conjunctivae normal.   Cardiovascular:      Rate and Rhythm: Normal rate and regular rhythm.   Pulmonary:      Effort: Pulmonary effort is normal. No respiratory distress.      Breath sounds: Normal breath sounds. No stridor. No wheezing or rales.   Abdominal:      General: Bowel sounds are normal.      Palpations: Abdomen is soft.   Musculoskeletal:      Right lower leg: Edema present.      Left lower leg: Edema present.   Skin:     General: Skin is warm and dry.   Neurological:      General: No focal deficit present.      Mental Status: He is alert and oriented to person, place, and time.   Psychiatric:         Mood and Affect: Mood normal.         Behavior: Behavior normal.               Results:       Imaging:   I have personally reviewed the below imaging and concur with the reported findings unless otherwise stated:    CT chest abdomen pelvis w IV contrast    Result Date: 2/29/2024  Impression: Lung cancer restaging scan compared to CT chest dated 11/24/2023 and PET-CT dated 08/28/2023: 1. Decreased extent of local and metastatic disease as evidenced by decreased size of the primary right lower lobe perihilar pulmonary lesion, the left lower lobe cavitary lesion, and several additional pulmonary nodules. Additionally, there is decreased size of a partially calcified subcarinal node and several additional posterior mediastinal and upper abdominal nodes. No regions of new metastatic disease identified. 2. Stable small right pleural effusion. 3. Small bilateral hydroceles.   I personally reviewed the image(s)/study and resident interpretation as stated by Dr. Maritza Castano MD. I agree with the findings as stated. This study was interpreted at [facility], [place], [place].    MACRO: None.   Signed by: Dinesh Mabry 2/29/2024 5:39 PM Dictation workstation:   [id number]           Assessment/Plan   Torres Rivera is a 53 y.o. male here for follow up.      Torres Rivera is a 53 y.o. male with PMHx HTN, psoriasis, and tobacco use who presented with NSCLC squamous cell ca, no targetable mutations, neg PD-L1 who is s/p concurrent chemoradiation with weekly carbo/taxol then progression after holding durvalumab due to G2 pneumonitis. Had one cycle of ipi/nivo, but developed pneumonitis. He is now presenting to the clinic for cycle 6 of docetaxel/ramicurimab.    #NSCLC squamous cell ca  - T3N3M0, Stage IIIC originally, now with M1a progression  - PD-L1 neg, no targetable mutations  - supraclavicular node biopsy-proven squamous cell ca  - met Dr. Lew and discussed concurrent chemoradiation  - he has concerns about radiation and treatment in general   - discussed if we move forward with curative-intent concurrent chemoradiation, would receive weekly carbo/taxol. discussed palliative treatment with combination chemo-immunotherapy would be the non-radiation option, unfortunately surgery is not a viable  option.  - discussed potential side effects of carbo/taxol including but not limited to allergic reaction, nausea/vomiting, diarrhea/constipation, fatigue, injury to liver/kidney, risk of infection, anemia/thrombocytopenia. consented 2/7/23  - C1D1 concurrent chemoRT with weekly carbo/taxol 2/21/23  - discussed a year of durvalumab afterwards broadly  - 3/28/23 Carbo/Taxol held for thrombocytopenia (66), given IVF in infusion for decreased fluid intake/tachycardia  -last radiation 4/3/23; last Carbo/Taxol 3/21/23  - consented for 1 year durvalumab 4/25/23 and received two doses  - CT scan concerning for pneumonitis in view of worsening SOB, prednisone initiated at 1mg/kg/day (90mg) as well as PPI/Bactrim prophylactically, completed steroid taper with resolution of symptoms.   - personally  reviewed PET/CT with FDG-avidity  of LLL lesion and possible abdominal lymph node; will obtain biopsy of LLL lesion  - personally reviewed brain MRI without evidence of disease  - discussed results of biopsy of LLL nodule, with same histology and biomarkers. PD-L1 10%. Discussed likelihood that this is M1a progression. He remains uninterested in any further radiation and strongly would like to pursue immunotherapy. Discussed potential of recurrent pneumonitis. Discussed clinical trial option, which he is currently also not interested in.  - Full NGS tempus on tissue - unfortunately insufficient tissue from most recent biopsy and original biopsy  - liquid NGS without targetable mutation  - Previously discussed ipi/nivo and risks, particularly risk of recurrent pneumonitis. Consented 10/17/23 and C1D1 10/17/23. Had unexpected restaging scan after C1, with G1 pneumonitis noted on scan. He is asymptomatic.   - discussed next line of therapy with either SoC docetaxel/ramucirumab vs clinical trial. He currently does not have short term disability insurance and is very concerned about being able to continue working while on clinical trial. Consented for docetaxel/ramucirumab 11/28/23  - started docetaxel/ramucirumab 12/12/23  - personally reviewed restaging scans with evidence of partial response  - C4 was complicated by G2 fatigue, and will dose reduce docetaxel 20% for fatigue  -C5 complicated by fatigue, discussed with Dr. Blevins and will dose reduce docetaxel further (33.3%)  -duplex US ordered of BLE for BLE edema    - next restaging scans in 3 months (end of May, not yet ordered)  - RTC 3 weeks for next cycle  -TSH elevated with normal T4, will monitor closely, orders added to treatment plan    # G2 pneumonitis-irAE vs radiation pneumonitis- -> recurrent G1 pneumonitis  -asymptomatic, will monitor  -Stopped ipi/nivo    #HTN  - new HTN (likely a side effect of ramicurimab), now improving on lisinopril 20 mg  daily    #Insomnia  - likely shift-related due to alternating work during day and night  - hold PO dexamethasone  - continue melatonin     #Anxiety  -related to diagnosis of lung cancer  -Appreciate SW support     #Psoriasis  -Reports previously diagnosed by dermatology and was previously on Otezla when rash was more diffuse  -Currently on topical steroid, reports minimal benefit. Small rash present on the elbow  - offered dermatology referral at last visit, he deferred.

## 2024-04-02 ENCOUNTER — HOSPITAL ENCOUNTER (OUTPATIENT)
Dept: VASCULAR MEDICINE | Facility: HOSPITAL | Age: 54
Discharge: HOME | End: 2024-04-02
Payer: COMMERCIAL

## 2024-04-02 DIAGNOSIS — C34.31 PRIMARY SQUAMOUS CELL CARCINOMA OF LOWER LOBE OF RIGHT LUNG (MULTI): ICD-10-CM

## 2024-04-02 DIAGNOSIS — R60.0 EDEMA OF BOTH LOWER LEGS: ICD-10-CM

## 2024-04-02 PROCEDURE — 93970 EXTREMITY STUDY: CPT | Performed by: SURGERY

## 2024-04-02 PROCEDURE — 93970 EXTREMITY STUDY: CPT

## 2024-04-16 ENCOUNTER — LAB (OUTPATIENT)
Dept: LAB | Facility: HOSPITAL | Age: 54
End: 2024-04-16
Payer: COMMERCIAL

## 2024-04-16 ENCOUNTER — OFFICE VISIT (OUTPATIENT)
Dept: HEMATOLOGY/ONCOLOGY | Facility: HOSPITAL | Age: 54
End: 2024-04-16
Payer: COMMERCIAL

## 2024-04-16 ENCOUNTER — INFUSION (OUTPATIENT)
Dept: HEMATOLOGY/ONCOLOGY | Facility: HOSPITAL | Age: 54
End: 2024-04-16
Payer: COMMERCIAL

## 2024-04-16 VITALS
DIASTOLIC BLOOD PRESSURE: 70 MMHG | WEIGHT: 216 LBS | BODY MASS INDEX: 33.83 KG/M2 | TEMPERATURE: 98.2 F | OXYGEN SATURATION: 97 % | RESPIRATION RATE: 16 BRPM | SYSTOLIC BLOOD PRESSURE: 109 MMHG | HEART RATE: 114 BPM

## 2024-04-16 DIAGNOSIS — C34.31 PRIMARY SQUAMOUS CELL CARCINOMA OF LOWER LOBE OF RIGHT LUNG (MULTI): Primary | ICD-10-CM

## 2024-04-16 DIAGNOSIS — C34.31 PRIMARY SQUAMOUS CELL CARCINOMA OF LOWER LOBE OF RIGHT LUNG (MULTI): ICD-10-CM

## 2024-04-16 LAB
ALBUMIN SERPL BCP-MCNC: 3.6 G/DL (ref 3.4–5)
ALP SERPL-CCNC: 51 U/L (ref 33–120)
ALT SERPL W P-5'-P-CCNC: 15 U/L (ref 10–52)
ANION GAP SERPL CALC-SCNC: 13 MMOL/L (ref 10–20)
APPEARANCE UR: CLEAR
AST SERPL W P-5'-P-CCNC: 17 U/L (ref 9–39)
BASOPHILS # BLD AUTO: 0.07 X10*3/UL (ref 0–0.1)
BASOPHILS NFR BLD AUTO: 1.1 %
BILIRUB SERPL-MCNC: 0.5 MG/DL (ref 0–1.2)
BILIRUB UR STRIP.AUTO-MCNC: NEGATIVE MG/DL
BUN SERPL-MCNC: 17 MG/DL (ref 6–23)
CALCIUM SERPL-MCNC: 9.1 MG/DL (ref 8.6–10.3)
CHLORIDE SERPL-SCNC: 108 MMOL/L (ref 98–107)
CO2 SERPL-SCNC: 24 MMOL/L (ref 21–32)
COLOR UR: YELLOW
CREAT SERPL-MCNC: 0.86 MG/DL (ref 0.5–1.3)
DACRYOCYTES BLD QL SMEAR: NORMAL
EGFRCR SERPLBLD CKD-EPI 2021: >90 ML/MIN/1.73M*2
EOSINOPHIL # BLD AUTO: 0.02 X10*3/UL (ref 0–0.7)
EOSINOPHIL NFR BLD AUTO: 0.3 %
ERYTHROCYTE [DISTWIDTH] IN BLOOD BY AUTOMATED COUNT: 20.5 % (ref 11.5–14.5)
GLUCOSE SERPL-MCNC: 112 MG/DL (ref 74–99)
GLUCOSE UR STRIP.AUTO-MCNC: NORMAL MG/DL
HCT VFR BLD AUTO: 44.4 % (ref 41–52)
HGB BLD-MCNC: 14.2 G/DL (ref 13.5–17.5)
IMM GRANULOCYTES # BLD AUTO: 0.04 X10*3/UL (ref 0–0.7)
IMM GRANULOCYTES NFR BLD AUTO: 0.6 % (ref 0–0.9)
KETONES UR STRIP.AUTO-MCNC: ABNORMAL MG/DL
LEUKOCYTE ESTERASE UR QL STRIP.AUTO: NEGATIVE
LYMPHOCYTES # BLD AUTO: 1.49 X10*3/UL (ref 1.2–4.8)
LYMPHOCYTES NFR BLD AUTO: 22.5 %
MCH RBC QN AUTO: 28.6 PG (ref 26–34)
MCHC RBC AUTO-ENTMCNC: 32 G/DL (ref 32–36)
MCV RBC AUTO: 90 FL (ref 80–100)
MONOCYTES # BLD AUTO: 0.87 X10*3/UL (ref 0.1–1)
MONOCYTES NFR BLD AUTO: 13.2 %
NEUTROPHILS # BLD AUTO: 4.12 X10*3/UL (ref 1.2–7.7)
NEUTROPHILS NFR BLD AUTO: 62.3 %
NITRITE UR QL STRIP.AUTO: NEGATIVE
NRBC BLD-RTO: 0 /100 WBCS (ref 0–0)
OVALOCYTES BLD QL SMEAR: NORMAL
PH UR STRIP.AUTO: 5.5 [PH]
PLATELET # BLD AUTO: 268 X10*3/UL (ref 150–450)
POLYCHROMASIA BLD QL SMEAR: NORMAL
POTASSIUM SERPL-SCNC: 4.3 MMOL/L (ref 3.5–5.3)
PROT SERPL-MCNC: 5.8 G/DL (ref 6.4–8.2)
PROT UR STRIP.AUTO-MCNC: NEGATIVE MG/DL
RBC # BLD AUTO: 4.96 X10*6/UL (ref 4.5–5.9)
RBC # UR STRIP.AUTO: NEGATIVE /UL
RBC MORPH BLD: NORMAL
SODIUM SERPL-SCNC: 141 MMOL/L (ref 136–145)
SP GR UR STRIP.AUTO: 1.03
T4 FREE SERPL-MCNC: 1.35 NG/DL (ref 0.78–1.48)
TSH SERPL-ACNC: 4.6 MIU/L (ref 0.44–3.98)
UROBILINOGEN UR STRIP.AUTO-MCNC: NORMAL MG/DL
WBC # BLD AUTO: 6.6 X10*3/UL (ref 4.4–11.3)

## 2024-04-16 PROCEDURE — 85025 COMPLETE CBC W/AUTO DIFF WBC: CPT

## 2024-04-16 PROCEDURE — 99215 OFFICE O/P EST HI 40 MIN: CPT | Performed by: STUDENT IN AN ORGANIZED HEALTH CARE EDUCATION/TRAINING PROGRAM

## 2024-04-16 PROCEDURE — 2500000001 HC RX 250 WO HCPCS SELF ADMINISTERED DRUGS (ALT 637 FOR MEDICARE OP): Performed by: STUDENT IN AN ORGANIZED HEALTH CARE EDUCATION/TRAINING PROGRAM

## 2024-04-16 PROCEDURE — 36415 COLL VENOUS BLD VENIPUNCTURE: CPT

## 2024-04-16 PROCEDURE — 2500000004 HC RX 250 GENERAL PHARMACY W/ HCPCS (ALT 636 FOR OP/ED): Performed by: STUDENT IN AN ORGANIZED HEALTH CARE EDUCATION/TRAINING PROGRAM

## 2024-04-16 PROCEDURE — 2500000004 HC RX 250 GENERAL PHARMACY W/ HCPCS (ALT 636 FOR OP/ED): Mod: JZ | Performed by: STUDENT IN AN ORGANIZED HEALTH CARE EDUCATION/TRAINING PROGRAM

## 2024-04-16 PROCEDURE — 80053 COMPREHEN METABOLIC PANEL: CPT

## 2024-04-16 PROCEDURE — 3074F SYST BP LT 130 MM HG: CPT | Performed by: STUDENT IN AN ORGANIZED HEALTH CARE EDUCATION/TRAINING PROGRAM

## 2024-04-16 PROCEDURE — 3078F DIAST BP <80 MM HG: CPT | Performed by: STUDENT IN AN ORGANIZED HEALTH CARE EDUCATION/TRAINING PROGRAM

## 2024-04-16 PROCEDURE — 96413 CHEMO IV INFUSION 1 HR: CPT

## 2024-04-16 PROCEDURE — 81003 URINALYSIS AUTO W/O SCOPE: CPT

## 2024-04-16 PROCEDURE — 96375 TX/PRO/DX INJ NEW DRUG ADDON: CPT | Mod: INF

## 2024-04-16 PROCEDURE — 96417 CHEMO IV INFUS EACH ADDL SEQ: CPT

## 2024-04-16 PROCEDURE — 99215 OFFICE O/P EST HI 40 MIN: CPT | Mod: 25 | Performed by: STUDENT IN AN ORGANIZED HEALTH CARE EDUCATION/TRAINING PROGRAM

## 2024-04-16 PROCEDURE — 84439 ASSAY OF FREE THYROXINE: CPT

## 2024-04-16 PROCEDURE — 84443 ASSAY THYROID STIM HORMONE: CPT

## 2024-04-16 RX ORDER — EPINEPHRINE 0.3 MG/.3ML
0.3 INJECTION SUBCUTANEOUS EVERY 5 MIN PRN
Status: CANCELLED | OUTPATIENT
Start: 2024-04-16

## 2024-04-16 RX ORDER — DIPHENHYDRAMINE HCL 50 MG
50 CAPSULE ORAL ONCE
Status: COMPLETED | OUTPATIENT
Start: 2024-04-16 | End: 2024-04-16

## 2024-04-16 RX ORDER — HEPARIN SODIUM,PORCINE/PF 10 UNIT/ML
50 SYRINGE (ML) INTRAVENOUS AS NEEDED
OUTPATIENT
Start: 2024-04-16

## 2024-04-16 RX ORDER — DIPHENHYDRAMINE HYDROCHLORIDE 50 MG/ML
50 INJECTION INTRAMUSCULAR; INTRAVENOUS AS NEEDED
Status: CANCELLED | OUTPATIENT
Start: 2024-04-16

## 2024-04-16 RX ORDER — HEPARIN 100 UNIT/ML
500 SYRINGE INTRAVENOUS AS NEEDED
OUTPATIENT
Start: 2024-04-16

## 2024-04-16 RX ORDER — DIPHENHYDRAMINE HYDROCHLORIDE 50 MG/ML
50 INJECTION INTRAMUSCULAR; INTRAVENOUS AS NEEDED
Status: DISCONTINUED | OUTPATIENT
Start: 2024-04-16 | End: 2024-04-16 | Stop reason: HOSPADM

## 2024-04-16 RX ORDER — ALBUTEROL SULFATE 0.83 MG/ML
3 SOLUTION RESPIRATORY (INHALATION) AS NEEDED
Status: DISCONTINUED | OUTPATIENT
Start: 2024-04-16 | End: 2024-04-16 | Stop reason: HOSPADM

## 2024-04-16 RX ORDER — EPINEPHRINE 0.3 MG/.3ML
0.3 INJECTION SUBCUTANEOUS EVERY 5 MIN PRN
Status: DISCONTINUED | OUTPATIENT
Start: 2024-04-16 | End: 2024-04-16 | Stop reason: HOSPADM

## 2024-04-16 RX ORDER — HEPARIN SODIUM 1000 [USP'U]/ML
2000 INJECTION, SOLUTION INTRAVENOUS; SUBCUTANEOUS AS NEEDED
OUTPATIENT
Start: 2024-04-16

## 2024-04-16 RX ORDER — FAMOTIDINE 10 MG/ML
20 INJECTION INTRAVENOUS ONCE AS NEEDED
Status: DISCONTINUED | OUTPATIENT
Start: 2024-04-16 | End: 2024-04-16 | Stop reason: HOSPADM

## 2024-04-16 RX ORDER — PROCHLORPERAZINE EDISYLATE 5 MG/ML
10 INJECTION INTRAMUSCULAR; INTRAVENOUS EVERY 6 HOURS PRN
Status: CANCELLED | OUTPATIENT
Start: 2024-04-16

## 2024-04-16 RX ORDER — DIPHENHYDRAMINE HCL 50 MG
50 CAPSULE ORAL ONCE
Status: CANCELLED | OUTPATIENT
Start: 2024-04-16

## 2024-04-16 RX ORDER — PROCHLORPERAZINE MALEATE 10 MG
10 TABLET ORAL EVERY 6 HOURS PRN
Status: CANCELLED | OUTPATIENT
Start: 2024-04-16

## 2024-04-16 RX ORDER — PROCHLORPERAZINE MALEATE 10 MG
10 TABLET ORAL EVERY 6 HOURS PRN
Status: DISCONTINUED | OUTPATIENT
Start: 2024-04-16 | End: 2024-04-16 | Stop reason: HOSPADM

## 2024-04-16 RX ORDER — FAMOTIDINE 10 MG/ML
20 INJECTION INTRAVENOUS ONCE AS NEEDED
Status: CANCELLED | OUTPATIENT
Start: 2024-04-16

## 2024-04-16 RX ORDER — PROCHLORPERAZINE EDISYLATE 5 MG/ML
10 INJECTION INTRAMUSCULAR; INTRAVENOUS EVERY 6 HOURS PRN
Status: DISCONTINUED | OUTPATIENT
Start: 2024-04-16 | End: 2024-04-16 | Stop reason: HOSPADM

## 2024-04-16 RX ORDER — ALBUTEROL SULFATE 0.83 MG/ML
3 SOLUTION RESPIRATORY (INHALATION) AS NEEDED
Status: CANCELLED | OUTPATIENT
Start: 2024-04-16

## 2024-04-16 RX ADMIN — DIPHENHYDRAMINE HYDROCHLORIDE 50 MG: 50 CAPSULE ORAL at 10:12

## 2024-04-16 RX ADMIN — DOCETAXEL ANHYDROUS 109.5 MG: 10 INJECTION, SOLUTION INTRAVENOUS at 12:01

## 2024-04-16 RX ADMIN — DEXAMETHASONE SODIUM PHOSPHATE 10 MG: 4 INJECTION, SOLUTION INTRA-ARTICULAR; INTRALESIONAL; INTRAMUSCULAR; INTRAVENOUS; SOFT TISSUE at 10:12

## 2024-04-16 RX ADMIN — SODIUM CHLORIDE 1000 MG: 9 INJECTION, SOLUTION INTRAVENOUS at 10:42

## 2024-04-16 ASSESSMENT — PAIN SCALES - GENERAL: PAINLEVEL: 0-NO PAIN

## 2024-04-16 NOTE — PROGRESS NOTES
Torres Rivera is a 54 y.o. male who presents for Chemotherapy.    He is on the following chemotherapy regimen:     Treatment Plans       Name Type Plan Dates Plan Provider         Active    Ramucirumab + DOCEtaxel, 21 Day Cycles Oncology Treatment  11/28/2023 - Present Belia Blevins MD                  .    Since his last visit, he has been doing fair.  Overall, he states his energy level is improving.  His appetite has been unchanged and good.  He reports fatigue.  He has no other concerns.    His veins are becoming more difficult to access, PIV placed in [place] after second try.  Patient tolerated all infusions without incident.  Blood return noted on PIV.  IV removed and patient discharged home with family in stable condition.

## 2024-04-16 NOTE — PROGRESS NOTES
[facility] - Medical Oncology Follow-Up Visit     Patient ID: Torres Rivera is a 53 y.o. male      Current therapy:     DOCEtaxeL (Taxotere) 160 mg in dextrose 5 % in water (D5W) 258 mL IV, 75 mg/m2, intravenous, Once, 1 of 17 cycles     ramucirumab (Cyramza) 10 mg/kg = 1,000 mg in sodium chloride 0.9% 250 mL IV, 10 mg/kg, intravenous, Once, 1 of 17 cycles    Oncologic History:   DIAGNOSIS  NSCLC squamous cell ca     STAGING  T3N3M0, now with M1a progression      CURRENT SITES OF DISEASE  RLL, R hilar, subcarinal, supraclavicular, LLL     MOLECULAR GENOMICS  RLL:  PD-L1 <1%  PIK3CA amplification  TP53 splice site (NM_[id number] c.673-1G>T)     LLL:  PD-L1 10%  PIK3CA amplification  TP53 splice site ([id number] c.673-1G>T)      PRIOR THERAPY  concurrent chemoradiation weekly carbo/taxol 2/21/23 - 4/3/23  durvalumab 4/25/23 - 5/9/23 (2 doses)     CURRENT THERAPY  Docetaxel/Ramucirumab 12/12/2024 - present     CURRENT ONCOLOGICAL PROBLEMS    Onc History     Pt presented to [facility] on 12/31/22 for acute chest pain. CT PE showed a mass like opacity in the RLL 5.9x4.3x2.5cm with subcarinal and R hilar adenopathy and postobstructive pneumonia. Mild interlobular septal thickening at the R lung base  was thought possibly due to lymphangitic carcinomatosis. He was prescribed antibiotics and prednisone. His chest pain resolved after a few days. He otherwise reports he had felt in good health. Denied new SOB, cough, weight loss, fatigue, change in appetite,  fever or chills. He has stable chronic SOB from smoking. He underwent bronchoscopy with biopsy on 1/11/23 at  with pathology revealing squamous cell ca of station 7, NGS without targetable mutation,  insufficient tissue for PD-L1. PET/CT revealed FDG-avidity of R supclavicular node as well. Biopsy of the supraclavicular node positive for squamous cell ca. Offered concurrent chemoradiation with weekly carbo/taxol which he started  on 2/21/23 and ended  4/3/23. He started durvalumab 4/25/23. Course c/b pneumonitis, and durvalumab held after 2 doses (5/2023). Treated with steroids with improvement. Restaging scans unfortunately with concern for new LLL nodule, and PET/CT 8/2023 with FDG avidity of new  lesion. Biopsy done 9/22/23, with pathology consistent with squamous cell ca, same PIK3CA and TP53 mutations. He is not interested in any further radiation and strongly prefers immunotherapy. Discussed risk of recurrent pneumonitis, which he understands. Plan to move forward with ipi/nivo which started 10/17/23. Unfortunately he had a scan after one cycle, with G1 asymptomatic pneumonitis, and possible progression. He agreed to docetaxel/ramucirumab as next line - is interested in clinical trial but cannot take off from work and is worried about finances. Started docetaxel/ramucirumab 12/12/24, with partial response after 4 cycles. C4 with G2 fatigue, and dose-reduced docetaxel 20%. He had persistent fatigue, and dose reduced docetaxel 33% for C5.         PAST MEDICAL HISTORY  HTN  Tobacco use disorder   BPH  HLD  Osteoporosis   Psoriasis      SOCIAL HISTORY  Home: lives with his wife   Work:   Tobacco: quit 1/2023. 1PPD x 30 yrs   Alcohol: ~4 drinks/week  Drugs: none    Meds (Current):     Current Outpatient Medications on File Prior to Visit   Medication Sig Dispense Refill    dexAMETHasone (Decadron) 4 mg tablet Take 4 mg (1 tablet) twice daily for 3 days starting the day BEFORE treatment. 12 tablet 3    ondansetron (Zofran) 8 mg tablet Take 1 tablet (8 mg) by mouth every 8 hours if needed for nausea or vomiting. 30 tablet 5    prochlorperazine (Compazine) 10 mg tablet Take 1 tablet (10 mg) by mouth every 6 hours if needed for nausea or vomiting. 30 tablet 5    rosuvastatin (Crestor) 10 mg tablet Take 1 tablet (10 mg) by mouth once daily. 90 tablet 3    Ventolin HFA 90 mcg/actuation inhaler Inhale.      lisinopril 5 mg tablet Take  1 tablet (5 mg) by mouth once daily. 90 tablet 2        ALLERGIES  NKDA     FAMILY HISTORY   No family history of lung cancer      Chief Concern: Here in clinic for follow-up and treatment with docetaxel/ramicurumab      HPI:  He is here today with his wife. He is still tired, but a little less fatigued after the last cycle, and nausea was a little less. The diarrhea is a little better also, mushy. His bilateral lower edema is about the same.       Review of Systems - Oncology 12 point ROS was obtained and negative unless otherwise mentioned in the above HPI.       Objective   Visit Vitals  /70 (BP Location: Left arm, Patient Position: Sitting)   Pulse (!) 114   Temp 36.8 °C (98.2 °F) (Temporal)   Resp 16   Wt 98 kg (216 lb)   SpO2 97%   BMI 33.83 kg/m²   Smoking Status Former   BSA 2.15 m²        Results for orders placed or performed in visit on 04/16/24 (from the past 24 hour(s))   CBC and Auto Differential   Result Value Ref Range    WBC 6.6 4.4 - 11.3 x10*3/uL    nRBC 0.0 0.0 - 0.0 /100 WBCs    RBC 4.96 4.50 - 5.90 x10*6/uL    Hemoglobin 14.2 13.5 - 17.5 g/dL    Hematocrit 44.4 41.0 - 52.0 %    MCV 90 80 - 100 fL    MCH 28.6 26.0 - 34.0 pg    MCHC 32.0 32.0 - 36.0 g/dL    RDW 20.5 (H) 11.5 - 14.5 %    Platelets 268 150 - 450 x10*3/uL    Neutrophils % 62.3 40.0 - 80.0 %    Immature Granulocytes %, Automated 0.6 0.0 - 0.9 %    Lymphocytes % 22.5 13.0 - 44.0 %    Monocytes % 13.2 2.0 - 10.0 %    Eosinophils % 0.3 0.0 - 6.0 %    Basophils % 1.1 0.0 - 2.0 %    Neutrophils Absolute 4.12 1.20 - 7.70 x10*3/uL    Immature Granulocytes Absolute, Automated 0.04 0.00 - 0.70 x10*3/uL    Lymphocytes Absolute 1.49 1.20 - 4.80 x10*3/uL    Monocytes Absolute 0.87 0.10 - 1.00 x10*3/uL    Eosinophils Absolute 0.02 0.00 - 0.70 x10*3/uL    Basophils Absolute 0.07 0.00 - 0.10 x10*3/uL   Comprehensive metabolic panel   Result Value Ref Range    Glucose 112 (H) 74 - 99 mg/dL    Sodium 141 136 - 145 mmol/L    Potassium 4.3 3.5 -  5.3 mmol/L    Chloride 108 (H) 98 - 107 mmol/L    Bicarbonate 24 21 - 32 mmol/L    Anion Gap 13 10 - 20 mmol/L    Urea Nitrogen 17 6 - 23 mg/dL    Creatinine 0.86 0.50 - 1.30 mg/dL    eGFR >90 >60 mL/min/1.73m*2    Calcium 9.1 8.6 - 10.3 mg/dL    Albumin 3.6 3.4 - 5.0 g/dL    Alkaline Phosphatase 51 33 - 120 U/L    Total Protein 5.8 (L) 6.4 - 8.2 g/dL    AST 17 9 - 39 U/L    Bilirubin, Total 0.5 0.0 - 1.2 mg/dL    ALT 15 10 - 52 U/L   Morphology   Result Value Ref Range    RBC Morphology See Below     Polychromasia Mild     Ovalocytes Few     Teardrop Cells Few        ECOG Score: 1- Restricted in physically strenuous activity.  Carries out light duty.      Physical Exam  Constitutional:       General: He is not in acute distress.     Appearance: He is not toxic-appearing.   Eyes:      Extraocular Movements: Extraocular movements intact.      Conjunctiva/sclera: Conjunctivae normal.   Cardiovascular:      Rate and Rhythm: Normal rate and regular rhythm.   Pulmonary:      Effort: Pulmonary effort is normal. No respiratory distress.      Breath sounds: Normal breath sounds. No stridor. No wheezing or rales.   Abdominal:      General: Bowel sounds are normal.      Palpations: Abdomen is soft.   Musculoskeletal:      Right lower leg: Edema present.      Left lower leg: Edema present.   Skin:     General: Skin is warm and dry.   Neurological:      General: No focal deficit present.      Mental Status: He is alert and oriented to person, place, and time.   Psychiatric:         Mood and Affect: Mood normal.         Behavior: Behavior normal.               Results:       Imaging:   I have personally reviewed the below imaging and concur with the reported findings unless otherwise stated:        Assessment/Plan   Torres Rivera is a 53 y.o. male here for follow up.      Torres Rivera is a 53 y.o. male with PMHx HTN, psoriasis, and tobacco use who presented with NSCLC squamous cell ca, no targetable mutations, neg PD-L1  who is s/p concurrent chemoradiation with weekly carbo/taxol then progression after holding durvalumab due to G2 pneumonitis. Had one cycle of ipi/nivo, but developed pneumonitis. He is now presenting to the clinic for cycle 7 of docetaxel/ramicurimab.    #NSCLC squamous cell ca  - T3N3M0, Stage IIIC originally, now with M1a progression  - PD-L1 neg, no targetable mutations  - supraclavicular node biopsy-proven squamous cell ca  - met Dr. Lew and discussed concurrent chemoradiation  - he has concerns about radiation and treatment in general   - discussed if we move forward with curative-intent concurrent chemoradiation, would receive weekly carbo/taxol. discussed palliative treatment with combination chemo-immunotherapy would be the non-radiation option, unfortunately surgery is not a viable  option.  - discussed potential side effects of carbo/taxol including but not limited to allergic reaction, nausea/vomiting, diarrhea/constipation, fatigue, injury to liver/kidney, risk of infection, anemia/thrombocytopenia. consented 2/7/23  - C1D1 concurrent chemoRT with weekly carbo/taxol 2/21/23  - discussed a year of durvalumab afterwards broadly  - 3/28/23 Carbo/Taxol held for thrombocytopenia (66), given IVF in infusion for decreased fluid intake/tachycardia  -last radiation 4/3/23; last Carbo/Taxol 3/21/23  - consented for 1 year durvalumab 4/25/23 and received two doses  - CT scan concerning for pneumonitis in view of worsening SOB, prednisone initiated at 1mg/kg/day (90mg) as well as PPI/Bactrim prophylactically, completed steroid taper with resolution of symptoms.   - personally reviewed PET/CT with FDG-avidity  of LLL lesion and possible abdominal lymph node; will obtain biopsy of LLL lesion  - personally reviewed brain MRI without evidence of disease  - discussed results of biopsy of LLL nodule, with same histology and biomarkers. PD-L1 10%. Discussed likelihood that this is M1a progression. He remains  uninterested in any further radiation and strongly would like to pursue immunotherapy. Discussed potential of recurrent pneumonitis. Discussed clinical trial option, which he is currently also not interested in.  - Full NGS tempus on tissue - unfortunately insufficient tissue from most recent biopsy and original biopsy  - liquid NGS without targetable mutation  - Previously discussed ipi/nivo and risks, particularly risk of recurrent pneumonitis. Consented 10/17/23 and C1D1 10/17/23. Had unexpected restaging scan after C1, with G1 pneumonitis noted on scan. He is asymptomatic.   - discussed next line of therapy with either SoC docetaxel/ramucirumab vs clinical trial. He currently does not have short term disability insurance and is very concerned about being able to continue working while on clinical trial. Consented for docetaxel/ramucirumab 11/28/23  - started docetaxel/ramucirumab 12/12/23  - personally reviewed restaging scans with evidence of partial response  - C4 was complicated by G2 fatigue, and will dose reduce docetaxel 20% for fatigue  -C5 complicated by fatigue, dose reduced docetaxel further (33.3%)  - will continue with docetaxel and ramucirumab at current dosing  - next restaging scans in 3 months (end of May, ordered today)  - RTC 3 weeks for next cycle  -TSH elevated with normal T4, will monitor closely, orders added to treatment plan    # Fatigue  - dose-reduced docetaxel as above  - refer to integrative oncology per patient preference    # BLE edema  - had dopplers and no DVT  - will order echocardiogram    # G2 pneumonitis-irAE vs radiation pneumonitis- -> recurrent G1 pneumonitis  -asymptomatic, will monitor  -Stopped ipi/nivo    #HTN  - new HTN (likely a side effect of ramicurimab), now improving on lisinopril 20 mg daily    #Insomnia  - likely shift-related due to alternating work during day and night  - hold PO dexamethasone  - continue melatonin     #Anxiety  -related to diagnosis of lung  cancer  -Appreciate SW support     #Psoriasis  -Reports previously diagnosed by dermatology and was previously on Otezla when rash was more diffuse  -Currently on topical steroid, reports minimal benefit. Small rash present on the elbow  - offered dermatology referral at last visit, he deferred.

## 2024-04-25 ENCOUNTER — HOSPITAL ENCOUNTER (OUTPATIENT)
Dept: CARDIOLOGY | Facility: HOSPITAL | Age: 54
Discharge: HOME | End: 2024-04-25
Payer: COMMERCIAL

## 2024-04-25 DIAGNOSIS — I10 ESSENTIAL (PRIMARY) HYPERTENSION: ICD-10-CM

## 2024-04-25 DIAGNOSIS — C34.31 PRIMARY SQUAMOUS CELL CARCINOMA OF LOWER LOBE OF RIGHT LUNG (MULTI): ICD-10-CM

## 2024-04-25 LAB
AORTIC VALVE MEAN GRADIENT: 3 MMHG
AORTIC VALVE PEAK VELOCITY: 1.05 M/S
AV PEAK GRADIENT: 4.4 MMHG
AVA (PEAK VEL): 2.99 CM2
AVA (VTI): 3.68 CM2
LEFT ATRIUM VOLUME AREA LENGTH INDEX BSA: 20.4 ML/M2
LEFT VENTRICLE INTERNAL DIMENSION DIASTOLE: 3.68 CM (ref 3.5–6)
LEFT VENTRICULAR OUTFLOW TRACT DIAMETER: 2.2 CM
MITRAL VALVE E/A RATIO: 0.83
RIGHT VENTRICLE FREE WALL PEAK S': 12.8 CM/S
RIGHT VENTRICLE PEAK SYSTOLIC PRESSURE: 19.3 MMHG
TRICUSPID ANNULAR PLANE SYSTOLIC EXCURSION: 1.5 CM

## 2024-04-25 PROCEDURE — 93306 TTE W/DOPPLER COMPLETE: CPT | Performed by: INTERNAL MEDICINE

## 2024-04-25 PROCEDURE — 76376 3D RENDER W/INTRP POSTPROCES: CPT | Performed by: INTERNAL MEDICINE

## 2024-04-25 PROCEDURE — 76376 3D RENDER W/INTRP POSTPROCES: CPT

## 2024-04-25 PROCEDURE — 93356 MYOCRD STRAIN IMG SPCKL TRCK: CPT | Performed by: INTERNAL MEDICINE

## 2024-05-01 ENCOUNTER — ALLIED HEALTH (OUTPATIENT)
Dept: INTEGRATIVE MEDICINE | Facility: CLINIC | Age: 54
End: 2024-05-01
Payer: COMMERCIAL

## 2024-05-01 DIAGNOSIS — C34.31 PRIMARY SQUAMOUS CELL CARCINOMA OF LOWER LOBE OF RIGHT LUNG (MULTI): Primary | ICD-10-CM

## 2024-05-01 PROCEDURE — 99205 OFFICE O/P NEW HI 60 MIN: CPT | Performed by: HOSPITALIST

## 2024-05-01 NOTE — PROGRESS NOTES
Patient ID: Torres Rivera is a 54 y.o. male.  Referring Physician: No referring provider defined for this encounter.  Primary Care Provider: Elizabeth Norton PA-C    CANCER HISTORY:   Current therapy:      DOCEtaxeL (Taxotere) 160 mg in dextrose 5 % in water (D5W) 258 mL IV, 75 mg/m2, intravenous, Once, 1 of 17 cycles     ramucirumab (Cyramza) 10 mg/kg = 1,000 mg in sodium chloride 0.9% 250 mL IV, 10 mg/kg, intravenous, Once, 1 of 17 cycles     Oncologic History:   DIAGNOSIS  NSCLC squamous cell ca     STAGING  T3N3M0, now with M1a progression      CURRENT SITES OF DISEASE  RLL, R hilar, subcarinal, supraclavicular, LLL     MOLECULAR GENOMICS  RLL:  PD-L1 <1%  PIK3CA amplification  TP53 splice site (NM_[id number] c.673-1G>T)     LLL:  PD-L1 10%  PIK3CA amplification  TP53 splice site ([id number] c.673-1G>T)      PRIOR THERAPY  concurrent chemoradiation weekly carbo/taxol 2/21/23 - 4/3/23 - c/b pneumonitis - improved w/steroids  durvalumab 4/25/23 - 5/9/23 (2 doses)     CURRENT THERAPY  Docetaxel/Ramucirumab 12/12/2024 - present (M1a progression)    INTEGRATIVE HISTORY:  Symptoms:  Fatigue - normal TSH   All the time now - mostly 1 week after chemotherapy - dose reduced with mild improvement    Pain in legs, back - frequently    Insomnia - sleeping well now    Anxiety - related to diagnosis  Some dizziness    Diet: limited appetite, last 2 mo gained 40lbs, now losing 20 lbs - less appetite  Now off steroids    PA: very little PA, walking some at home    Sleep: now sleeping well    Stress: stopped working ()  Not a lot of issues    Natural Products:    None, mvi    ROS:  no ha, visual symptoms, hearing loss  no sob, chest pain, palp  ROS o/w non contributory, please see HPI    Objective    BSA: There is no height or weight on file to calculate BSA.  There were no vitals taken for this visit.    PHYSICAL EXAM:  NAD, awake/alert  HEENT, NCAT, OP clear, no oral lesions  CTA bilat  RRR no mgr  Abd  soft/nt/nd+bs  No c/c/e/ttp  Motor/sensory intact, CN 2-12 intact     RESULTS:  Lab Results   Component Value Date    WBC 6.6 04/16/2024    HGB 14.2 04/16/2024    HCT 44.4 04/16/2024     04/16/2024     10/17/2023    CREATININE 0.86 04/16/2024    AST 17 04/16/2024     Assessment/Plan   Cancer Staging   Primary squamous cell carcinoma of lower lobe of right lung (Multi)  Staging form: Lung, AJCC 8th Edition  - Clinical: Stage JENNA (rcT3, cN3, pM1a) - Signed by Belia Blevins MD on 10/3/2023    53 yo man with squamous cell lung ca s/p chemoradiotherapy and durvalumab c/b pneumonitis.  Then M1a progression - on docetaxel/rami since 12/23    CANCER SPECIFIC RECCS:  LIFESTYLE:  Diet - 5-9 fruits/veg/day (7 veg to 2 fruits)  Cruciferous Vegetables - Brussel Sprouts, Kale, Broccoli, Cauliflower  Low sugar  Orgain - protein supplement (130 g/day)  PHYSICAL ACTIVITY 30 MIN/DAY    Reading:  [employer] Living  Cancer Fighting Kitchen    Websites:  Cook for your Life  [email or web address].org    SYMPTOM MANAGEMENT:  Fatigue:  Nutrition: Limit sugar, eat frequent small meals  Consider Integrative Modalities:  Acupuncture, massage, Reiki - try acupuncture weekly x 4-6 weeks  Stress management -    Expressive Arts - Health Tracks, Music therapy, Journaling   Exercise 30 min/day  Essential oil - lavender (diffuser)    Natural Products to Consider:  American Ginseng (2 grams/day) if on active treatment  Turmeric (Adry)  [employer] STAMETS 7    Follow up:  3 months    Thank you for consulting me in for this patient  Kike Chan MD

## 2024-05-07 ENCOUNTER — APPOINTMENT (OUTPATIENT)
Dept: HEMATOLOGY/ONCOLOGY | Facility: HOSPITAL | Age: 54
End: 2024-05-07
Payer: COMMERCIAL

## 2024-05-07 ENCOUNTER — LAB (OUTPATIENT)
Dept: LAB | Facility: HOSPITAL | Age: 54
End: 2024-05-07
Payer: COMMERCIAL

## 2024-05-07 ENCOUNTER — OFFICE VISIT (OUTPATIENT)
Dept: HEMATOLOGY/ONCOLOGY | Facility: HOSPITAL | Age: 54
End: 2024-05-07
Payer: COMMERCIAL

## 2024-05-07 VITALS
TEMPERATURE: 97 F | HEART RATE: 113 BPM | SYSTOLIC BLOOD PRESSURE: 126 MMHG | DIASTOLIC BLOOD PRESSURE: 84 MMHG | OXYGEN SATURATION: 96 % | RESPIRATION RATE: 17 BRPM | BODY MASS INDEX: 32.31 KG/M2 | WEIGHT: 206.3 LBS

## 2024-05-07 DIAGNOSIS — R53.0 NEOPLASTIC MALIGNANT RELATED FATIGUE: Primary | ICD-10-CM

## 2024-05-07 DIAGNOSIS — C34.31 PRIMARY SQUAMOUS CELL CARCINOMA OF LOWER LOBE OF RIGHT LUNG (MULTI): ICD-10-CM

## 2024-05-07 DIAGNOSIS — R53.0 NEOPLASTIC MALIGNANT RELATED FATIGUE: ICD-10-CM

## 2024-05-07 LAB
ALBUMIN SERPL BCP-MCNC: 3.4 G/DL (ref 3.4–5)
ALP SERPL-CCNC: 48 U/L (ref 33–120)
ALT SERPL W P-5'-P-CCNC: 17 U/L (ref 10–52)
ANION GAP SERPL CALC-SCNC: 12 MMOL/L (ref 10–20)
APPEARANCE UR: CLEAR
AST SERPL W P-5'-P-CCNC: 16 U/L (ref 9–39)
BASOPHILS # BLD AUTO: 0.09 X10*3/UL (ref 0–0.1)
BASOPHILS NFR BLD AUTO: 1.3 %
BILIRUB SERPL-MCNC: 0.4 MG/DL (ref 0–1.2)
BILIRUB UR STRIP.AUTO-MCNC: NEGATIVE MG/DL
BUN SERPL-MCNC: 16 MG/DL (ref 6–23)
CALCIUM SERPL-MCNC: 9.2 MG/DL (ref 8.6–10.3)
CHLORIDE SERPL-SCNC: 110 MMOL/L (ref 98–107)
CO2 SERPL-SCNC: 23 MMOL/L (ref 21–32)
COLOR UR: YELLOW
CREAT SERPL-MCNC: 0.73 MG/DL (ref 0.5–1.3)
DACRYOCYTES BLD QL SMEAR: NORMAL
EGFRCR SERPLBLD CKD-EPI 2021: >90 ML/MIN/1.73M*2
EOSINOPHIL # BLD AUTO: 0 X10*3/UL (ref 0–0.7)
EOSINOPHIL NFR BLD AUTO: 0 %
ERYTHROCYTE [DISTWIDTH] IN BLOOD BY AUTOMATED COUNT: 20.2 % (ref 11.5–14.5)
GLUCOSE SERPL-MCNC: 93 MG/DL (ref 74–99)
GLUCOSE UR STRIP.AUTO-MCNC: NORMAL MG/DL
HCT VFR BLD AUTO: 42.7 % (ref 41–52)
HGB BLD-MCNC: 13.8 G/DL (ref 13.5–17.5)
HYALINE CASTS #/AREA URNS AUTO: ABNORMAL /LPF
IMM GRANULOCYTES # BLD AUTO: 0.03 X10*3/UL (ref 0–0.7)
IMM GRANULOCYTES NFR BLD AUTO: 0.4 % (ref 0–0.9)
KETONES UR STRIP.AUTO-MCNC: NEGATIVE MG/DL
LEUKOCYTE ESTERASE UR QL STRIP.AUTO: NEGATIVE
LYMPHOCYTES # BLD AUTO: 1.22 X10*3/UL (ref 1.2–4.8)
LYMPHOCYTES NFR BLD AUTO: 18.1 %
MCH RBC QN AUTO: 28.6 PG (ref 26–34)
MCHC RBC AUTO-ENTMCNC: 32.3 G/DL (ref 32–36)
MCV RBC AUTO: 89 FL (ref 80–100)
MONOCYTES # BLD AUTO: 0.85 X10*3/UL (ref 0.1–1)
MONOCYTES NFR BLD AUTO: 12.6 %
NEUTROPHILS # BLD AUTO: 4.55 X10*3/UL (ref 1.2–7.7)
NEUTROPHILS NFR BLD AUTO: 67.6 %
NITRITE UR QL STRIP.AUTO: NEGATIVE
NRBC BLD-RTO: 0 /100 WBCS (ref 0–0)
OVALOCYTES BLD QL SMEAR: NORMAL
PH UR STRIP.AUTO: 5.5 [PH]
PLATELET # BLD AUTO: 277 X10*3/UL (ref 150–450)
POTASSIUM SERPL-SCNC: 4.4 MMOL/L (ref 3.5–5.3)
PROT SERPL-MCNC: 5.5 G/DL (ref 6.4–8.2)
PROT UR STRIP.AUTO-MCNC: NORMAL MG/DL
RBC # BLD AUTO: 4.82 X10*6/UL (ref 4.5–5.9)
RBC # UR STRIP.AUTO: NEGATIVE /UL
RBC #/AREA URNS AUTO: ABNORMAL /HPF
RBC MORPH BLD: NORMAL
SODIUM SERPL-SCNC: 141 MMOL/L (ref 136–145)
SP GR UR STRIP.AUTO: 1.03
TESTOST SERPL-MCNC: 605 NG/DL (ref 240–1000)
TSH SERPL-ACNC: 3.9 MIU/L (ref 0.44–3.98)
UROBILINOGEN UR STRIP.AUTO-MCNC: NORMAL MG/DL
WBC # BLD AUTO: 6.7 X10*3/UL (ref 4.4–11.3)
WBC #/AREA URNS AUTO: ABNORMAL /HPF

## 2024-05-07 PROCEDURE — 85025 COMPLETE CBC W/AUTO DIFF WBC: CPT

## 2024-05-07 PROCEDURE — 99215 OFFICE O/P EST HI 40 MIN: CPT | Performed by: STUDENT IN AN ORGANIZED HEALTH CARE EDUCATION/TRAINING PROGRAM

## 2024-05-07 PROCEDURE — 84443 ASSAY THYROID STIM HORMONE: CPT

## 2024-05-07 PROCEDURE — 80053 COMPREHEN METABOLIC PANEL: CPT

## 2024-05-07 PROCEDURE — 84403 ASSAY OF TOTAL TESTOSTERONE: CPT

## 2024-05-07 PROCEDURE — 3074F SYST BP LT 130 MM HG: CPT | Performed by: STUDENT IN AN ORGANIZED HEALTH CARE EDUCATION/TRAINING PROGRAM

## 2024-05-07 PROCEDURE — 3079F DIAST BP 80-89 MM HG: CPT | Performed by: STUDENT IN AN ORGANIZED HEALTH CARE EDUCATION/TRAINING PROGRAM

## 2024-05-07 PROCEDURE — 36415 COLL VENOUS BLD VENIPUNCTURE: CPT

## 2024-05-07 PROCEDURE — 81001 URINALYSIS AUTO W/SCOPE: CPT

## 2024-05-07 ASSESSMENT — PAIN SCALES - GENERAL: PAINLEVEL: 0-NO PAIN

## 2024-05-07 NOTE — PATIENT INSTRUCTIONS
Dr Blevins would like to see you on May 28 for a follow up appointment. You will need labs drawn prior to seeing her, and you will have an infusion that day.    You are scheduled for a scan tomorrow, May 8 at 10:30 am at the [facility] [place]. Please do not have anything to eat or drink except clear liquids 3 hours prior to your scan.    Please call [phone] option 5, option 2 for any questions or concerns. Please call [phone] option 1, for any scheduling concerns or issues.

## 2024-05-07 NOTE — PROGRESS NOTES
[facility] - Medical Oncology Follow-Up Visit     Patient ID: Torres Rivera is a 53 y.o. male      Current therapy:     DOCEtaxeL (Taxotere) 160 mg in dextrose 5 % in water (D5W) 258 mL IV, 75 mg/m2, intravenous, Once, 1 of 17 cycles     ramucirumab (Cyramza) 10 mg/kg = 1,000 mg in sodium chloride 0.9% 250 mL IV, 10 mg/kg, intravenous, Once, 1 of 17 cycles    Oncologic History:   DIAGNOSIS  NSCLC squamous cell ca     STAGING  T3N3M0, now with M1a progression      CURRENT SITES OF DISEASE  RLL, R hilar, subcarinal, supraclavicular, LLL     MOLECULAR GENOMICS  RLL:  PD-L1 <1%  PIK3CA amplification  TP53 splice site (NM_[id number] c.673-1G>T)     LLL:  PD-L1 10%  PIK3CA amplification  TP53 splice site ([id number] c.673-1G>T)      PRIOR THERAPY  concurrent chemoradiation weekly carbo/taxol 2/21/23 - 4/3/23  durvalumab 4/25/23 - 5/9/23 (2 doses)     CURRENT THERAPY  Docetaxel/Ramucirumab 12/12/2024 - present     CURRENT ONCOLOGICAL PROBLEMS    Onc History     Pt presented to [facility] on 12/31/22 for acute chest pain. CT PE showed a mass like opacity in the RLL 5.9x4.3x2.5cm with subcarinal and R hilar adenopathy and postobstructive pneumonia. Mild interlobular septal thickening at the R lung base  was thought possibly due to lymphangitic carcinomatosis. He was prescribed antibiotics and prednisone. His chest pain resolved after a few days. He otherwise reports he had felt in good health. Denied new SOB, cough, weight loss, fatigue, change in appetite,  fever or chills. He has stable chronic SOB from smoking. He underwent bronchoscopy with biopsy on 1/11/23 at  with pathology revealing squamous cell ca of station 7, NGS without targetable mutation,  insufficient tissue for PD-L1. PET/CT revealed FDG-avidity of R supclavicular node as well. Biopsy of the supraclavicular node positive for squamous cell ca. Offered concurrent chemoradiation with weekly carbo/taxol which he started  on 2/21/23 and ended  4/3/23. He started durvalumab 4/25/23. Course c/b pneumonitis, and durvalumab held after 2 doses (5/2023). Treated with steroids with improvement. Restaging scans unfortunately with concern for new LLL nodule, and PET/CT 8/2023 with FDG avidity of new  lesion. Biopsy done 9/22/23, with pathology consistent with squamous cell ca, same PIK3CA and TP53 mutations. He is not interested in any further radiation and strongly prefers immunotherapy. Discussed risk of recurrent pneumonitis, which he understands. Plan to move forward with ipi/nivo which started 10/17/23. Unfortunately he had a scan after one cycle, with G1 asymptomatic pneumonitis, and possible progression. He agreed to docetaxel/ramucirumab as next line - is interested in clinical trial but cannot take off from work and is worried about finances. Started docetaxel/ramucirumab 12/12/24, with partial response after 4 cycles. C4 with G2 fatigue, and dose-reduced docetaxel 20%. He had persistent fatigue, and dose reduced docetaxel 33% for C5. C7 complicated by G3 fatigue, and discussed stopping docetaxel and referral for phase 1 clinical trial, which he prefers.         PAST MEDICAL HISTORY  HTN  Tobacco use disorder   BPH  HLD  Osteoporosis   Psoriasis      SOCIAL HISTORY  Home: lives with his wife   Work:   Tobacco: quit 1/2023. 1PPD x 30 yrs   Alcohol: ~4 drinks/week  Drugs: none    Meds (Current):     Current Outpatient Medications on File Prior to Visit   Medication Sig Dispense Refill    dexAMETHasone (Decadron) 4 mg tablet Take 4 mg (1 tablet) twice daily for 3 days starting the day BEFORE treatment. 12 tablet 3    ondansetron (Zofran) 8 mg tablet Take 1 tablet (8 mg) by mouth every 8 hours if needed for nausea or vomiting. 30 tablet 5    prochlorperazine (Compazine) 10 mg tablet Take 1 tablet (10 mg) by mouth every 6 hours if needed for nausea or vomiting. 30 tablet 5    rosuvastatin (Crestor) 10 mg tablet Take 1 tablet  (10 mg) by mouth once daily. 90 tablet 3    Ventolin HFA 90 mcg/actuation inhaler Inhale.      lisinopril 5 mg tablet Take 1 tablet (5 mg) by mouth once daily. 90 tablet 2        ALLERGIES  NKDA     FAMILY HISTORY   No family history of lung cancer      Chief Concern: Here in clinic for follow-up and treatment with docetaxel/ramicurumab      HPI:  He is here today with his wife. He is exhausted. He spends most of the day in bed. When he tries to move, he is very tired, feels exhausted and has pain in his legs and back. The pain in his back is happening more often. No shortness of breath or cough.       Review of Systems - Oncology 12 point ROS was obtained and negative unless otherwise mentioned in the above HPI.       Objective   Visit Vitals  /84 (BP Location: Left arm, Patient Position: Sitting)   Pulse (!) 113   Temp 36.1 °C (97 °F) (Temporal)   Resp 17   Wt 93.6 kg (206 lb 4.8 oz)   SpO2 96%   BMI 32.31 kg/m²   Smoking Status Former   BSA 2.1 m²        Results for orders placed or performed in visit on 05/07/24 (from the past 24 hour(s))   CBC and Auto Differential   Result Value Ref Range    WBC 6.7 4.4 - 11.3 x10*3/uL    nRBC 0.0 0.0 - 0.0 /100 WBCs    RBC 4.82 4.50 - 5.90 x10*6/uL    Hemoglobin 13.8 13.5 - 17.5 g/dL    Hematocrit 42.7 41.0 - 52.0 %    MCV 89 80 - 100 fL    MCH 28.6 26.0 - 34.0 pg    MCHC 32.3 32.0 - 36.0 g/dL    RDW 20.2 (H) 11.5 - 14.5 %    Platelets 277 150 - 450 x10*3/uL    Neutrophils %      Immature Granulocytes %, Automated      Lymphocytes %      Monocytes %      Eosinophils %      Basophils %      Neutrophils Absolute      Lymphocytes Absolute      Monocytes Absolute      Eosinophils Absolute      Basophils Absolute       ECOG Score: 2- Ambulatory and  capable of all selfcare; unable to carry out work activities.  Up and about > 50% of waking hrs.      Physical Exam  Constitutional:       General: He is not in acute distress.     Appearance: He is not toxic-appearing.   Eyes:       Extraocular Movements: Extraocular movements intact.      Conjunctiva/sclera: Conjunctivae normal.   Cardiovascular:      Rate and Rhythm: Normal rate and regular rhythm.   Pulmonary:      Effort: Pulmonary effort is normal. No respiratory distress.      Breath sounds: Normal breath sounds. No stridor. No wheezing or rales.   Abdominal:      General: Bowel sounds are normal.      Palpations: Abdomen is soft.   Musculoskeletal:      Right lower leg: Edema present.      Left lower leg: Edema present.   Skin:     General: Skin is warm and dry.   Neurological:      General: No focal deficit present.      Mental Status: He is alert and oriented to person, place, and time.   Psychiatric:         Mood and Affect: Mood normal.         Behavior: Behavior normal.               Results:       Imaging:   I have personally reviewed the below imaging and concur with the reported findings unless otherwise stated:        Assessment/Plan   Torres Rivera is a 53 y.o. male here for follow up.      Torres Rivera is a 53 y.o. male with PMHx HTN, psoriasis, and tobacco use who presented with NSCLC squamous cell ca, no targetable mutations, neg PD-L1 who is s/p concurrent chemoradiation with weekly carbo/taxol then progression after holding durvalumab due to G2 pneumonitis. Had one cycle of ipi/nivo, but developed pneumonitis. He is now presenting to the clinic for cycle 7 of docetaxel/ramicurimab.    #NSCLC squamous cell ca  - T3N3M0, Stage IIIC originally, now with M1a progression  - PD-L1 neg, no targetable mutations  - supraclavicular node biopsy-proven squamous cell ca  - met Dr. Lew and discussed concurrent chemoradiation  - he has concerns about radiation and treatment in general   - discussed if we move forward with curative-intent concurrent chemoradiation, would receive weekly carbo/taxol. discussed palliative treatment with combination chemo-immunotherapy would be the non-radiation option, unfortunately surgery  is not a viable  option.  - discussed potential side effects of carbo/taxol including but not limited to allergic reaction, nausea/vomiting, diarrhea/constipation, fatigue, injury to liver/kidney, risk of infection, anemia/thrombocytopenia. consented 2/7/23  - C1D1 concurrent chemoRT with weekly carbo/taxol 2/21/23  - discussed a year of durvalumab afterwards broadly  - 3/28/23 Carbo/Taxol held for thrombocytopenia (66), given IVF in infusion for decreased fluid intake/tachycardia  -last radiation 4/3/23; last Carbo/Taxol 3/21/23  - consented for 1 year durvalumab 4/25/23 and received two doses  - CT scan concerning for pneumonitis in view of worsening SOB, prednisone initiated at 1mg/kg/day (90mg) as well as PPI/Bactrim prophylactically, completed steroid taper with resolution of symptoms.   - personally reviewed PET/CT with FDG-avidity  of LLL lesion and possible abdominal lymph node; will obtain biopsy of LLL lesion  - personally reviewed brain MRI without evidence of disease  - discussed results of biopsy of LLL nodule, with same histology and biomarkers. PD-L1 10%. Discussed likelihood that this is M1a progression. He remains uninterested in any further radiation and strongly would like to pursue immunotherapy. Discussed potential of recurrent pneumonitis. Discussed clinical trial option, which he is currently also not interested in.  - Full NGS tempus on tissue - unfortunately insufficient tissue from most recent biopsy and original biopsy  - liquid NGS without targetable mutation  - Previously discussed ipi/nivo and risks, particularly risk of recurrent pneumonitis. Consented 10/17/23 and C1D1 10/17/23. Had unexpected restaging scan after C1, with G1 pneumonitis noted on scan. He is asymptomatic.   - discussed next line of therapy with either SoC docetaxel/ramucirumab vs clinical trial. He currently does not have short term disability insurance and is very concerned about being able to continue working while  on clinical trial. Consented for docetaxel/ramucirumab 11/28/23  - started docetaxel/ramucirumab 12/12/23  - personally reviewed restaging scans with evidence of partial response  - C4 was complicated by G2 fatigue, and will dose reduce docetaxel 20% for fatigue  -C5 complicated by fatigue, dose reduced docetaxel further (33.3%)  - C7 complicated by worsening fatigue and weakness. Will hold further docetaxel. Recommended phase 1 clinical trial, pending improvement in fatigue, and will contact trials nurses  - obtain restaging CT C/A/P    # Back pain  - will have restaging scan as above, may need spine MRI if concern for spinal lesions    # Subclinical hyperthyroidism  -TSH elevated with normal T4, will monitor closely    # Fatigue  - dose-reduced docetaxel as above  - refer to integrative oncology per patient preference    # BLE edema  - had dopplers and no DVT  - will order echocardiogram    # G2 pneumonitis-irAE vs radiation pneumonitis- -> recurrent G1 pneumonitis  -asymptomatic, will monitor  -Stopped ipi/nivo    #HTN  - new HTN (likely a side effect of ramicurimab), now improving on lisinopril 20 mg daily     #Anxiety  -related to diagnosis of lung cancer  -Appreciate SW support     #Psoriasis  -Reports previously diagnosed by dermatology and was previously on Otezla when rash was more diffuse  -Currently on topical steroid, reports minimal benefit. Small rash present on the elbow  - offered dermatology referral at last visit, he deferred.

## 2024-05-08 ENCOUNTER — ALLIED HEALTH (OUTPATIENT)
Dept: INTEGRATIVE MEDICINE | Facility: CLINIC | Age: 54
End: 2024-05-08
Payer: COMMERCIAL

## 2024-05-08 ENCOUNTER — HOSPITAL ENCOUNTER (OUTPATIENT)
Dept: RADIOLOGY | Facility: CLINIC | Age: 54
Discharge: HOME | End: 2024-05-08
Payer: COMMERCIAL

## 2024-05-08 DIAGNOSIS — C34.31 PRIMARY SQUAMOUS CELL CARCINOMA OF LOWER LOBE OF RIGHT LUNG (MULTI): ICD-10-CM

## 2024-05-08 DIAGNOSIS — C34.31 PRIMARY SQUAMOUS CELL CARCINOMA OF LOWER LOBE OF RIGHT LUNG (MULTI): Primary | ICD-10-CM

## 2024-05-08 PROCEDURE — 74177 CT ABD & PELVIS W/CONTRAST: CPT

## 2024-05-08 PROCEDURE — 74177 CT ABD & PELVIS W/CONTRAST: CPT | Performed by: RADIOLOGY

## 2024-05-08 PROCEDURE — 71260 CT THORAX DX C+: CPT | Performed by: RADIOLOGY

## 2024-05-08 PROCEDURE — 2550000001 HC RX 255 CONTRASTS: Performed by: STUDENT IN AN ORGANIZED HEALTH CARE EDUCATION/TRAINING PROGRAM

## 2024-05-08 RX ADMIN — IOHEXOL 69 ML: 350 INJECTION, SOLUTION INTRAVENOUS at 10:54

## 2024-05-08 ASSESSMENT — ENCOUNTER SYMPTOMS
HEADACHES: 0
PALPITATIONS: 0
NAUSEA: 1
RHINORRHEA: 1
COUGH: 1
WHEEZING: 1
FEVER: 0
TROUBLE SWALLOWING: 0
CHILLS: 0

## 2024-05-08 NOTE — PROGRESS NOTES
Patient ID: Torres Rivera is a 54 y.o. male.  Referring Physician: No referring provider defined for this encounter.  Primary Care Provider: Elizabeth Norton PA-C    CANCER HISTORY:   Current therapy:      DOCEtaxeL (Taxotere) 160 mg in dextrose 5 % in water (D5W) 258 mL IV, 75 mg/m2, intravenous, Once, 1 of 17 cycles     ramucirumab (Cyramza) 10 mg/kg = 1,000 mg in sodium chloride 0.9% 250 mL IV, 10 mg/kg, intravenous, Once, 1 of 17 cycles    Did not get chemo yest     Oncologic History:   DIAGNOSIS  NSCLC squamous cell ca     STAGING  T3N3M0, now with M1a progression      CURRENT SITES OF DISEASE  RLL, R hilar, subcarinal, supraclavicular, LLL     MOLECULAR GENOMICS  RLL:  PD-L1 <1%  PIK3CA amplification  TP53 splice site ([id number] c.673-1G>T)     LLL:  PD-L1 10%  PIK3CA amplification  TP53 splice site ([id number] c.673-1G>T)      PRIOR THERAPY  concurrent chemoradiation weekly carbo/taxol 2/21/23 - 4/3/23 - c/b pneumonitis - improved w/steroids  durvalumab 4/25/23 - 5/9/23 (2 doses)     CURRENT THERAPY  Docetaxel/Ramucirumab 12/12/2024 - present (M1a progression)     INTEGRATIVE HISTORY:  Symptoms:  Fatigue - normal TSH              All the time now - mostly 1 week after chemotherapy - dose reduced with mild improvement     Pain in legs, back - frequently     Insomnia - sleeping well now     Anxiety - related to diagnosis  Some dizziness     Diet: limited appetite, last 2 mo gained 40lbs, now losing 20 lbs - less appetite  Now off steroids     PA: very little PA, walking some at home    Sleep: now sleeping well     Stress: stopped working ()  Not a lot of issues     Natural Products:    None, mvi    ROS:  no ha, visual symptoms, hearing loss  no sob, chest pain, palp  ROS o/w non contributory, please see HPI    Objective    BSA: There is no height or weight on file to calculate BSA.  There were no vitals taken for this visit.    PHYSICAL EXAM:  NAD, awake/alert  HEENT, NCAT, OP clear, no oral  lesions  CTA bilat  RRR no mgr  Abd soft/nt/nd+bs  No c/c/e/ttp  Motor/sensory intact, CN 2-12 intact     RESULTS:  Lab Results   Component Value Date    WBC 6.7 05/07/2024    HGB 13.8 05/07/2024    HCT 42.7 05/07/2024     05/07/2024     10/17/2023    CREATININE 0.73 05/07/2024    AST 16 05/07/2024       Assessment/Plan   Cancer Staging   Primary squamous cell carcinoma of lower lobe of right lung (Multi)  Staging form: Lung, AJCC 8th Edition  - Clinical: Stage JENNA (rcT3, cN3, pM1a) - Signed by Belia Blevins MD on 10/3/2023      CANCER SPECIFIC RECCS:  53 yo man with squamous cell lung ca s/p chemoradiotherapy and durvalumab c/b pneumonitis.  Then M1a progression - on docetaxel/rami since 12/23     CANCER SPECIFIC RECCS:  LIFESTYLE:  Diet - 5-9 fruits/veg/day (7 veg to 2 fruits)  Cruciferous Vegetables - Brussel Sprouts, Kale, Broccoli, Cauliflower  Low sugar  Orgain - protein supplement (130 g/day)  PHYSICAL ACTIVITY 30 MIN/DAY     Reading:  [employer]  Cancer Fighting Kitchen     Websites:  Cook for your Life  [email or web address]     SYMPTOM MANAGEMENT:  Fatigue:  Nutrition: Limit sugar, eat frequent small meals  Consider Integrative Modalities:  Acupuncture, massage, Reiki - try acupuncture weekly x 4-6 weeks  Stress management -               Expressive Arts - Health Tracks, Music therapy, Journaling   Exercise 30 min/day  Essential oil - lavender (diffuser)     Natural Products to Consider:  American Ginseng (2 grams/day) if on active treatment  Turmeric (Adry)  [employer] STAMETS 7     Follow up:  3 months    SYMPTOM MANAGEMENT:  Integrative Oncology Symptom Management:    The [facility] Integrative Oncology Symptom Management clinic offers multi-disciplinary supervised care of cancer patients using Integrative Modalities billed to insurance using NCCN and SIO/ASCO guideline-driven practices.  ESAS is obtained prior to and after each treatment by the practitioner    Symptoms  Managed:  Fatigue - mildly improved    Natural Products utilized:      Integrative Treatment: Acupuncture  Session #: 1  Frequency: weekly    Referrals:   Recommendations:    Follow Up:  Symptom Management: weekly  Integrative Oncology:     I have personally seen the patient and supervised the treatment by the integrative practitioner during this visit.  Pt had symptoms discussed and I was present for the patient's 60 minutes of direct patient care.     Kike Chan MD

## 2024-05-08 NOTE — PROGRESS NOTES
Acupuncture Visit:     Subjective   Patient ID: Torres Rivera is a 54 y.o. male who presents for No chief complaint on file.  Fatigue - Fatigue worse since chemo, typically felt better 1 week after therapy, bu now having trouble recovering.  Sleep- OK, falls alseep OK, better energy to start the day      Back/leg pain- Pain right leg, feels a lot of muscle fatigue , starts the day tired. Feels in thighs only not down leg    Anxiety- Stress from diagnosis,.     Diet: Has lost weight in past few weeks, Appeties is OK overall    BM normal -regular    Supps-  MVI none. Occ musroom coffee                  Review of Systems   Constitutional:  Negative for chills and fever.   HENT:  Positive for rhinorrhea. Negative for ear pain and trouble swallowing.         Taste is off   Eyes:         Always wattery   Respiratory:  Positive for cough (rare) and wheezing.    Cardiovascular:  Negative for chest pain and palpitations.   Gastrointestinal:  Positive for nausea (after chemo week.).   Skin:  Negative for rash.   Neurological:  Negative for headaches.            Provider reviewed plan for the acupuncture session, precautions and contraindications. Patient/guardian/hospital staff has given consent to treat with full understanding of what to expect during the session. Before acupuncture began, provider explained to the patient to communicate at any time if the procedure was causing discomfort past their tolerance level. Patient agreed to advise acupuncturist. The acupuncturist counseled the patient on the risks of acupuncture treatment including pain, infection, bleeding, and no relief of pain. The patient was positioned comfortably. There was no evidence of infection at the site of needle insertions.    Objective   Physical Exam                             Assessment/Plan   Diagnoses and all orders for this visit:  Primary squamous cell carcinoma of lower lobe of right lung (Multi) (Primary)

## 2024-05-21 ENCOUNTER — TELEPHONE (OUTPATIENT)
Dept: ADMISSION | Facility: HOSPITAL | Age: 54
End: 2024-05-21
Payer: COMMERCIAL

## 2024-05-21 NOTE — TELEPHONE ENCOUNTER
The patient is calling in for the results of his CT scan done on 5/8.    Results below:  IMPRESSION:  1. Interval increased size of right pleural effusion and interval  development of small left pleural effusion.  2. Redemonstration of prominent subcarinal lymph nodes. Stable  postradiation changes in the right lung medially. Stable masslike  consolidation in the right lower lobe. Stable appearance of lobulated  lesion in the left lower lobe and additional ground-glass opacities  and minute nodular densities bilaterally. No new suspicious lung  lesions visualized.  3. No evidence of metastatic disease in the abdomen and pelvis.  Additional chronic findings as above.    Has follow up on 5/28 but does not want to wait that long, message relayed to team

## 2024-05-22 ENCOUNTER — APPOINTMENT (OUTPATIENT)
Dept: RADIOLOGY | Facility: HOSPITAL | Age: 54
End: 2024-05-22
Payer: COMMERCIAL

## 2024-05-28 ENCOUNTER — OFFICE VISIT (OUTPATIENT)
Dept: HEMATOLOGY/ONCOLOGY | Facility: HOSPITAL | Age: 54
End: 2024-05-28
Payer: COMMERCIAL

## 2024-05-28 ENCOUNTER — APPOINTMENT (OUTPATIENT)
Dept: HEMATOLOGY/ONCOLOGY | Facility: HOSPITAL | Age: 54
End: 2024-05-28
Payer: COMMERCIAL

## 2024-05-28 VITALS
DIASTOLIC BLOOD PRESSURE: 78 MMHG | TEMPERATURE: 97.2 F | OXYGEN SATURATION: 96 % | RESPIRATION RATE: 17 BRPM | SYSTOLIC BLOOD PRESSURE: 121 MMHG | WEIGHT: 202.7 LBS | BODY MASS INDEX: 31.75 KG/M2 | HEART RATE: 105 BPM

## 2024-05-28 DIAGNOSIS — C34.31 PRIMARY SQUAMOUS CELL CARCINOMA OF LOWER LOBE OF RIGHT LUNG (MULTI): ICD-10-CM

## 2024-05-28 PROCEDURE — 3078F DIAST BP <80 MM HG: CPT | Performed by: STUDENT IN AN ORGANIZED HEALTH CARE EDUCATION/TRAINING PROGRAM

## 2024-05-28 PROCEDURE — 99215 OFFICE O/P EST HI 40 MIN: CPT | Performed by: STUDENT IN AN ORGANIZED HEALTH CARE EDUCATION/TRAINING PROGRAM

## 2024-05-28 PROCEDURE — 3074F SYST BP LT 130 MM HG: CPT | Performed by: STUDENT IN AN ORGANIZED HEALTH CARE EDUCATION/TRAINING PROGRAM

## 2024-05-28 PROCEDURE — 99215 OFFICE O/P EST HI 40 MIN: CPT | Mod: GC | Performed by: STUDENT IN AN ORGANIZED HEALTH CARE EDUCATION/TRAINING PROGRAM

## 2024-05-28 ASSESSMENT — PAIN SCALES - GENERAL: PAINLEVEL: 0-NO PAIN

## 2024-05-28 NOTE — PROGRESS NOTES
[facility] - Medical Oncology Follow-Up Visit    Patient ID: Torres Rivera is a 54 y.o. male      Current therapy: nivolumab (Opdivo) 360 mg in sodium chloride 0.9% 100 mL IV, 360 mg, intravenous, Once, 1 of 9 cycles    Administration: 360 mg (10/17/2023), 360 mg (11/7/2023)        ipilimumab (Yervoy) 100 mg in sodium chloride 0.9% 50 mL IV, 1 mg/kg = 100 mg, intravenous, Once, 1 of 9 cycles    Administration: 100 mg (10/17/2023)    DOCEtaxeL (Taxotere) 160 mg in dextrose 5 % in water (D5W) 258 mL IV, 75 mg/m2 = 160 mg, intravenous, Once, 7 of 17 cycles    Dose modification: 60 mg/m2 (original dose 75 mg/m2, Cycle 5), 50 mg/m2 (original dose 75 mg/m2, Cycle 6)    Administration: 160 mg (12/12/2023), 160 mg (1/2/2024), 160 mg (1/23/2024), 160 mg (2/13/2024), 131.4 mg (3/5/2024), 109.5 mg (3/26/2024), 109.5 mg (4/16/2024)        ramucirumab (Cyramza) 10 mg/kg = 1,000 mg in sodium chloride 0.9% 250 mL IV, 10 mg/kg = 1,000 mg, intravenous, Once, 7 of 17 cycles    Administration: 1,000 mg (12/12/2023), 1,000 mg (1/2/2024), 1,000 mg (1/23/2024), 1,000 mg (2/13/2024), 1,000 mg (3/5/2024), 1,000 mg (3/26/2024), 1,000 mg (4/16/2024)       Oncologic History:   DIAGNOSIS  NSCLC squamous cell ca     STAGING  T3N3M0, now with M1a progression      CURRENT SITES OF DISEASE  RLL, R hilar, subcarinal, supraclavicular, LLL     MOLECULAR GENOMICS  RLL:  PD-L1 <1%  PIK3CA amplification  TP53 splice site ([id number] c.673-1G>T)     LLL:  PD-L1 10%  PIK3CA amplification  TP53 splice site ([id number] c.673-1G>T)      PRIOR THERAPY  concurrent chemoradiation weekly carbo/taxol 2/21/23 - 4/3/23  durvalumab 4/25/23 - 5/9/23 (2 doses)     CURRENT THERAPY  Docetaxel/Ramucirumab 12/12/2024 - 4/16/24     CURRENT ONCOLOGICAL PROBLEMS     Onc History     Pt presented to [facility] on 12/31/22 for acute chest pain. CT PE showed a mass like opacity in the RLL 5.9x4.3x2.5cm with subcarinal and R hilar  adenopathy and postobstructive pneumonia. Mild interlobular septal thickening at the R lung base  was thought possibly due to lymphangitic carcinomatosis. He was prescribed antibiotics and prednisone. His chest pain resolved after a few days. He otherwise reports he had felt in good health. Denied new SOB, cough, weight loss, fatigue, change in appetite,  fever or chills. He has stable chronic SOB from smoking. He underwent bronchoscopy with biopsy on 1/11/23 at  with pathology revealing squamous cell ca of station 7, NGS without targetable mutation,  insufficient tissue for PD-L1. PET/CT revealed FDG-avidity of R supclavicular node as well. Biopsy of the supraclavicular node positive for squamous cell ca. Offered concurrent chemoradiation with weekly carbo/taxol which he started on 2/21/23 and ended  4/3/23. He started durvalumab 4/25/23. Course c/b pneumonitis, and durvalumab held after 2 doses (5/2023). Treated with steroids with improvement. Restaging scans unfortunately with concern for new LLL nodule, and PET/CT 8/2023 with FDG avidity of new  lesion. Biopsy done 9/22/23, with pathology consistent with squamous cell ca, same PIK3CA and TP53 mutations. He is not interested in any further radiation and strongly prefers immunotherapy. Discussed risk of recurrent pneumonitis, which he understands. Plan to move forward with ipi/nivo which started 10/17/23. Unfortunately he had a scan after one cycle, with G1 asymptomatic pneumonitis, and possible progression. He agreed to docetaxel/ramucirumab as next line - is interested in clinical trial but cannot take off from work and is worried about finances. Started docetaxel/ramucirumab 12/12/24, with partial response after 4 cycles. C4 with G2 fatigue, and dose-reduced docetaxel 20%. He had persistent fatigue, and dose reduced docetaxel 33% for C5. C7 complicated by G3 fatigue, and discussed stopping docetaxel and referral for phase 1 clinical trial, which he prefers.  Unfortunately, patient is not eligible for any clinical trials at [facility] at this time due to history of prior pneumonitis. Offered gemcitabine as SoC, and he will seek second opinion for other trial options first.        PAST MEDICAL HISTORY  HTN  Tobacco use disorder   BPH  HLD  Osteoporosis   Psoriasis      SOCIAL HISTORY  Home: lives with his wife   Work:   Tobacco: quit 1/2023. 1PPD x 30 yrs   Alcohol: ~4 drinks/week  Drugs: none    HPI   Mr. Rivera presents today with his wife to discuss next treatment options. He is feeling better since his last visit. His fatigue has improved and he is no longer spending the majority of the day in bed. He denies any chest pain, shortness of breath, cough, fever or chills.       Meds (Current):    Current Outpatient Medications:     lisinopril 40 mg tablet, Take 1 tablet (40 mg) by mouth once daily., Disp: 30 tablet, Rfl: 5    multivitamin with minerals (Adults Multivitamin) tablet, Take by mouth., Disp: , Rfl:     ondansetron (Zofran) 8 mg tablet, Take 1 tablet (8 mg) by mouth every 8 hours if needed for nausea or vomiting., Disp: 30 tablet, Rfl: 5    prochlorperazine (Compazine) 10 mg tablet, Take 1 tablet (10 mg) by mouth every 6 hours if needed for nausea or vomiting., Disp: 30 tablet, Rfl: 5    rosuvastatin (Crestor) 10 mg tablet, Take 1 tablet (10 mg) by mouth once daily., Disp: 90 tablet, Rfl: 3    Ventolin HFA 90 mcg/actuation inhaler, Inhale 2 times a day as needed., Disp: , Rfl:     Review of Systems   All other systems reviewed and are negative.       Objective   BSA: 2.08 meters squared  /78 (BP Location: Left arm, Patient Position: Sitting)   Pulse 105   Temp 36.2 °C (97.2 °F) (Temporal)   Resp 17   Wt 91.9 kg (202 lb 11.2 oz)   SpO2 96%   BMI 31.75 kg/m²     ECOG Performance Status:  1 - Symptomatic; fully ambulatory     Physical Exam:  General: alert, conversant, in no apparent distress  HEENT: normocephalic, atraumatic, EOMI, no  scleral icterus  CV: RRR, no murmurs  Pulm: CTAB, no wheezes or crackles, no increased work of breathing  Abd: soft, non tender, non distended  : no gross   Ext: warm, trace bilateral lower extremity edema  Skin: no rashes  Neuro: moving all extremities  Psych: normal affect      Results:  Labs:  Lab Results   Component Value Date    WBC 6.7 05/07/2024    HGB 13.8 05/07/2024    HCT 42.7 05/07/2024    MCV 89 05/07/2024     05/07/2024      Lab Results   Component Value Date    NEUTROABS 4.55 05/07/2024      Lab Results   Component Value Date    GLUCOSE 93 05/07/2024    CALCIUM 9.2 05/07/2024     05/07/2024    K 4.4 05/07/2024    CO2 23 05/07/2024     (H) 05/07/2024    BUN 16 05/07/2024    CREATININE 0.73 05/07/2024     Lab Results   Component Value Date    ALT 17 05/07/2024    AST 16 05/07/2024    ALKPHOS 48 05/07/2024    BILITOT 0.4 05/07/2024      Lab Results   Component Value Date    CORTISOL 6.1 10/17/2023    TSH 3.90 05/07/2024    FREET4 1.35 04/16/2024       Imaging:  I have personally reviewed the below imaging and concur with the reported findings unless otherwise stated:         === Results for orders placed in visit on 09/21/23 ===    MR brain w and wo IV contrast [[id number]] 09/21/2023    Status: Normal  No evidence of intracranial metastatic disease was identified at this  time.    MACRO:  None    === 05/08/24 ===    CT CHEST ABDOMEN PELVIS W IV CONTRAST    - Impression -  1. Interval increased size of right pleural effusion and interval  development of small left pleural effusion.  2. Redemonstration of prominent subcarinal lymph nodes. Stable  postradiation changes in the right lung medially. Stable masslike  consolidation in the right lower lobe. Stable appearance of lobulated  lesion in the left lower lobe and additional ground-glass opacities  and minute nodular densities bilaterally. No new suspicious lung  lesions visualized.  3. No evidence of metastatic disease in the abdomen and  pelvis.  Additional chronic findings as above.    Signed by: Ishan Corbin 5/10/2024 12:58 PM  Dictation workstation:   [id number]     Assessment/Plan      Torres Rivera is a 54 y.o. male PMHx PMHx HTN, psoriasis, and tobacco use who presented with NSCLC squamous cell ca, no targetable mutations, neg PD-L1 who is s/p concurrent chemoradiation with weekly carbo/taxol then progression after holding durvalumab due to G2 pneumonitis. Had one cycle of ipi/nivo, but developed pneumonitis. He is now presenting to the clinic for cycle 7 of docetaxel/ramicurimab.     #NSCLC squamous cell ca  - T3N3M0, Stage IIIC originally, now with M1a progression  - PD-L1 neg, no targetable mutations  - supraclavicular node biopsy-proven squamous cell ca  - met Dr. Lew and discussed concurrent chemoradiation  - he has concerns about radiation and treatment in general   - discussed if we move forward with curative-intent concurrent chemoradiation, would receive weekly carbo/taxol. discussed palliative treatment with combination chemo-immunotherapy would be the non-radiation option, unfortunately surgery is not a viable  option.  - discussed potential side effects of carbo/taxol including but not limited to allergic reaction, nausea/vomiting, diarrhea/constipation, fatigue, injury to liver/kidney, risk of infection, anemia/thrombocytopenia. consented 2/7/23  - C1D1 concurrent chemoRT with weekly carbo/taxol 2/21/23  - discussed a year of durvalumab afterwards broadly  - 3/28/23 Carbo/Taxol held for thrombocytopenia (66), given IVF in infusion for decreased fluid intake/tachycardia  -last radiation 4/3/23; last Carbo/Taxol 3/21/23  - consented for 1 year durvalumab 4/25/23 and received two doses  - CT scan concerning for pneumonitis in view of worsening SOB, prednisone initiated at 1mg/kg/day (90mg) as well as PPI/Bactrim prophylactically, completed steroid taper with resolution of symptoms.   - personally reviewed PET/CT with  FDG-avidity  of LLL lesion and possible abdominal lymph node; will obtain biopsy of LLL lesion  - personally reviewed brain MRI without evidence of disease  - discussed results of biopsy of LLL nodule, with same histology and biomarkers. PD-L1 10%. Discussed likelihood that this is M1a progression. He remains uninterested in any further radiation and strongly would like to pursue immunotherapy. Discussed potential of recurrent pneumonitis. Discussed clinical trial option, which he is currently also not interested in.  - Full NGS tempus on tissue - unfortunately insufficient tissue from most recent biopsy and original biopsy  - liquid NGS without targetable mutation  - Previously discussed ipi/nivo and risks, particularly risk of recurrent pneumonitis. Consented 10/17/23 and C1D1 10/17/23. Had unexpected restaging scan after C1, with G1 pneumonitis noted on scan. He is asymptomatic.   - discussed next line of therapy with either SoC docetaxel/ramucirumab vs clinical trial. He currently does not have short term disability insurance and is very concerned about being able to continue working while on clinical trial. Consented for docetaxel/ramucirumab 11/28/23  - started docetaxel/ramucirumab 12/12/23  - personally reviewed restaging scans with evidence of partial response  - C4 was complicated by G2 fatigue, and will dose reduce docetaxel 20% for fatigue  -C5 complicated by fatigue, dose reduced docetaxel further (33.3%)  - C7 complicated by worsening fatigue and weakness. Will hold further docetaxel. Recommended phase 1 clinical trial, pending improvement in fatigue, and will contact trials nurses  -Patient was screened for clinical trials, however, no eligible trials at this point in time due to his history of pneumonitis. Discussed the option for obtaining a second opinion at another academic institution to see if he can be screened for other clinical trials. Alternatively, single agent gemcitabine could be offered.  Patient was understanding and had the opportunity to ask questions. He will take some time to think about it and let us know his decision      # Back pain  - will have restaging scan as above, may need spine MRI if concern for spinal lesions  - CT 5/10/24 with degenerative changes in thoracolumbar spine. No metastatic bone lesions      # Subclinical hyperthyroidism  -TSH elevated with normal T4, will monitor closely     # Fatigue  - dose-reduced docetaxel as above  - refer to integrative oncology per patient preference     # BLE edema  - had dopplers and no DVT  - TTE 4/25/24 with EF 60-65%      # G2 pneumonitis-irAE vs radiation pneumonitis- -> recurrent G1 pneumonitis  -asymptomatic, will monitor  -Stopped ipi/nivo     #HTN  - new HTN (likely a side effect of ramicurimab), now improving on lisinopril 20 mg daily     #Anxiety  -related to diagnosis of lung cancer  -Appreciate SW support     #Psoriasis  -Reports previously diagnosed by dermatology and was previously on Otezla when rash was more diffuse  -Currently on topical steroid, reports minimal benefit. Small rash present on the elbow  - offered dermatology referral at last visit, he deferred.    Patient seen and discussed with Dr. Blevins.     Nabila Dominguez MD  IM PGY-3

## 2024-05-29 ENCOUNTER — APPOINTMENT (OUTPATIENT)
Dept: INTEGRATIVE MEDICINE | Facility: CLINIC | Age: 54
End: 2024-05-29
Payer: COMMERCIAL

## 2024-06-05 ENCOUNTER — APPOINTMENT (OUTPATIENT)
Dept: INTEGRATIVE MEDICINE | Facility: CLINIC | Age: 54
End: 2024-06-05
Payer: COMMERCIAL

## 2024-06-12 ENCOUNTER — APPOINTMENT (OUTPATIENT)
Dept: INTEGRATIVE MEDICINE | Facility: CLINIC | Age: 54
End: 2024-06-12
Payer: COMMERCIAL

## 2024-06-19 ENCOUNTER — APPOINTMENT (OUTPATIENT)
Dept: INTEGRATIVE MEDICINE | Facility: CLINIC | Age: 54
End: 2024-06-19
Payer: COMMERCIAL

## 2024-08-05 ENCOUNTER — APPOINTMENT (OUTPATIENT)
Dept: INTEGRATIVE MEDICINE | Facility: CLINIC | Age: 54
End: 2024-08-05
Payer: COMMERCIAL

## 2024-08-22 ENCOUNTER — TELEPHONE (OUTPATIENT)
Dept: HEMATOLOGY/ONCOLOGY | Facility: HOSPITAL | Age: 54
End: 2024-08-22
Payer: COMMERCIAL

## 2024-08-22 NOTE — TELEPHONE ENCOUNTER
Patient was in a clinical  trial at [facility], has progressed and no longer eligible. Coordinator at [facility] is looking to enroll patient in another trial, they have done a guardant 360 panel, one being researched.  Patient is asking if  would have any other eligible trials for him. If any questions, can contact [facility] Trials Coordinator Francis [phone]    Notifying provider  
Spoke with patient he is agreeable to re-engage with Dr. Blevins for an appointment on 8/27 At 12pm.  Appt. Booked, patient aware of time and location  
room air

## 2024-08-27 ENCOUNTER — OFFICE VISIT (OUTPATIENT)
Dept: HEMATOLOGY/ONCOLOGY | Facility: HOSPITAL | Age: 54
End: 2024-08-27
Payer: COMMERCIAL

## 2024-08-27 VITALS
TEMPERATURE: 96.8 F | RESPIRATION RATE: 17 BRPM | BODY MASS INDEX: 29.91 KG/M2 | SYSTOLIC BLOOD PRESSURE: 140 MMHG | DIASTOLIC BLOOD PRESSURE: 90 MMHG | WEIGHT: 191 LBS | HEART RATE: 109 BPM | OXYGEN SATURATION: 99 %

## 2024-08-27 DIAGNOSIS — C34.31 PRIMARY SQUAMOUS CELL CARCINOMA OF LOWER LOBE OF RIGHT LUNG (MULTI): Primary | ICD-10-CM

## 2024-08-27 PROCEDURE — 3077F SYST BP >= 140 MM HG: CPT | Performed by: STUDENT IN AN ORGANIZED HEALTH CARE EDUCATION/TRAINING PROGRAM

## 2024-08-27 PROCEDURE — 99215 OFFICE O/P EST HI 40 MIN: CPT | Performed by: STUDENT IN AN ORGANIZED HEALTH CARE EDUCATION/TRAINING PROGRAM

## 2024-08-27 PROCEDURE — 3080F DIAST BP >= 90 MM HG: CPT | Performed by: STUDENT IN AN ORGANIZED HEALTH CARE EDUCATION/TRAINING PROGRAM

## 2024-08-27 ASSESSMENT — PAIN SCALES - GENERAL: PAINLEVEL: 0-NO PAIN

## 2024-08-27 NOTE — PROGRESS NOTES
[facility] - Medical Oncology Follow-Up Visit    Patient ID: Torres Rivera is a 54 y.o. male      Current therapy: methylPREDNISolone sod succinate (PF) (SOLU-Medrol) 40 mg/mL injection 40 mg, 40 mg, intravenous, As needed, 1 of 9 cycles        nivolumab (Opdivo) 360 mg in sodium chloride 0.9% 100 mL IV, 360 mg, intravenous, Once, 1 of 9 cycles    Administration: 360 mg (10/17/2023), 360 mg (11/7/2023)        ipilimumab (Yervoy) 100 mg in sodium chloride 0.9% 50 mL IV, 1 mg/kg = 100 mg, intravenous, Once, 1 of 9 cycles    Administration: 100 mg (10/17/2023)    methylPREDNISolone sod succinate (PF) (SOLU-Medrol) 40 mg/mL injection 40 mg, 40 mg, intravenous, As needed, 7 of 17 cycles        DOCEtaxeL (Taxotere) 160 mg in dextrose 5 % in water (D5W) 258 mL IV, 75 mg/m2 = 160 mg, intravenous, Once, 7 of 17 cycles    Dose modification: 60 mg/m2 (original dose 75 mg/m2, Cycle 5), 50 mg/m2 (original dose 75 mg/m2, Cycle 6)    Administration: 160 mg (12/12/2023), 160 mg (1/2/2024), 160 mg (1/23/2024), 160 mg (2/13/2024), 131.4 mg (3/5/2024), 109.5 mg (3/26/2024), 109.5 mg (4/16/2024)        ramucirumab (Cyramza) 10 mg/kg = 1,000 mg in sodium chloride 0.9% 250 mL IV, 10 mg/kg = 1,000 mg, intravenous, Once, 7 of 17 cycles    Administration: 1,000 mg (12/12/2023), 1,000 mg (1/2/2024), 1,000 mg (1/23/2024), 1,000 mg (2/13/2024), 1,000 mg (3/5/2024), 1,000 mg (3/26/2024), 1,000 mg (4/16/2024)       Oncologic History:   DIAGNOSIS  NSCLC squamous cell ca     STAGING  T3N3M0, now with M1a progression      CURRENT SITES OF DISEASE  RLL, R hilar, subcarinal, supraclavicular, LLL     MOLECULAR GENOMICS  RLL:  PD-L1 <1%  PIK3CA amplification  TP53 splice site ([id number] c.673-1G>T)     LLL:  PD-L1 10%  PIK3CA amplification  TP53 splice site ([id number] c.673-1G>T)      PRIOR THERAPY  concurrent chemoradiation weekly carbo/taxol 2/21/23 - 4/3/23  durvalumab 4/25/23 - 5/9/23 (2  doses)  Docetaxel/Ramucirumab 12/12/2024 - 4/16/24  Clinical trial at [facility] (TORL)        CURRENT THERAPY    CURRENT ONCOLOGICAL PROBLEMS     Onc History     Pt presented to [facility] on 12/31/22 for acute chest pain. CT PE showed a mass like opacity in the RLL 5.9x4.3x2.5cm with subcarinal and R hilar adenopathy and postobstructive pneumonia. Mild interlobular septal thickening at the R lung base  was thought possibly due to lymphangitic carcinomatosis. He was prescribed antibiotics and prednisone. His chest pain resolved after a few days. He otherwise reports he had felt in good health. Denied new SOB, cough, weight loss, fatigue, change in appetite,  fever or chills. He has stable chronic SOB from smoking. He underwent bronchoscopy with biopsy on 1/11/23 at  with pathology revealing squamous cell ca of station 7, NGS without targetable mutation,  insufficient tissue for PD-L1. PET/CT revealed FDG-avidity of R supclavicular node as well. Biopsy of the supraclavicular node positive for squamous cell ca. Offered concurrent chemoradiation with weekly carbo/taxol which he started on 2/21/23 and ended  4/3/23. He started durvalumab 4/25/23. Course c/b pneumonitis, and durvalumab held after 2 doses (5/2023). Treated with steroids with improvement. Restaging scans unfortunately with concern for new LLL nodule, and PET/CT 8/2023 with FDG avidity of new  lesion. Biopsy done 9/22/23, with pathology consistent with squamous cell ca, same PIK3CA and TP53 mutations. He is not interested in any further radiation and strongly prefers immunotherapy. Discussed risk of recurrent pneumonitis, which he understands. Plan to move forward with ipi/nivo which started 10/17/23. Unfortunately he had a scan after one cycle, with G1 asymptomatic pneumonitis, and possible progression. He agreed to docetaxel/ramucirumab as next line - is interested in clinical trial but cannot take off from work and is worried about finances. Started  docetaxel/ramucirumab 12/12/24, with partial response after 4 cycles. C4 with G2 fatigue, and dose-reduced docetaxel 20%. He had persistent fatigue, and dose reduced docetaxel 33% for C5. C7 complicated by G3 fatigue, and discussed stopping docetaxel and referral for phase 1 clinical trial, which he prefers. Unfortunately, patient is not eligible for any clinical trials at [facility] at this time due to history of prior pneumonitis. Offered gemcitabine as SoC, and he will seek second opinion for other trial options first. Enrolled on clinical trial TORL at [facility] and progressed after 2 doses. Planning to enroll another clinical trial there.        PAST MEDICAL HISTORY  HTN  Tobacco use disorder   BPH  HLD  Osteoporosis   Psoriasis      SOCIAL HISTORY  Home: lives with his wife   Work:   Tobacco: quit 1/2023. 1PPD x 30 yrs   Alcohol: ~4 drinks/week  Drugs: none    HPI   Mr. Rivera presents alone - he progressed on TORL clinical trial at [facility]. Got two doses, and was progressing at first scan. He had side effects of severe muscle aches. He is being prescreened for [id number] trial of inhaled  (IL-2 directed nebulization). He is scheduled to start next week on 9/7. He's been off trial at least 4 weeks ago, and has pretty much gotten back to baseline. He's lost 15 lbs in the last weeks without trying.       Meds (Current):    Current Outpatient Medications:     multivitamin with minerals (Adults Multivitamin) tablet, Take by mouth., Disp: , Rfl:     ondansetron (Zofran) 8 mg tablet, Take 1 tablet (8 mg) by mouth every 8 hours if needed for nausea or vomiting., Disp: 30 tablet, Rfl: 5    rosuvastatin (Crestor) 10 mg tablet, Take 1 tablet (10 mg) by mouth once daily., Disp: 90 tablet, Rfl: 3    Ventolin HFA 90 mcg/actuation inhaler, Inhale 2 times a day as needed., Disp: , Rfl:     lisinopril 40 mg tablet, Take 1 tablet (40 mg) by mouth once daily., Disp: 30 tablet, Rfl: 5    prochlorperazine (Compazine) 10 mg  tablet, Take 1 tablet (10 mg) by mouth every 6 hours if needed for nausea or vomiting. (Patient not taking: Reported on 8/27/2024), Disp: 30 tablet, Rfl: 5    Review of Systems   All other systems reviewed and are negative.       Objective   BSA: 2.02 meters squared  /90 (BP Location: Left arm, Patient Position: Sitting)   Pulse 109   Temp 36 °C (96.8 °F) (Temporal)   Resp 17   Wt 86.6 kg (191 lb)   SpO2 99%   BMI 29.91 kg/m²     ECOG Performance Status:  1 - Symptomatic; fully ambulatory     Physical Exam:  General: alert, conversant, in no apparent distress  HEENT: normocephalic, atraumatic, EOMI, no scleral icterus  CV: RRR, no murmurs  Pulm: CTAB, no wheezes or crackles, no increased work of breathing  Abd: soft, non tender, non distended  : no gross   Ext: warm, trace bilateral lower extremity edema  Skin: no rashes  Neuro: moving all extremities  Psych: normal affect      Results:  Labs:  Lab Results   Component Value Date    WBC 6.7 05/07/2024    HGB 13.8 05/07/2024    HCT 42.7 05/07/2024    MCV 89 05/07/2024     05/07/2024      Lab Results   Component Value Date    NEUTROABS 4.55 05/07/2024      Lab Results   Component Value Date    GLUCOSE 93 05/07/2024    CALCIUM 9.2 05/07/2024     05/07/2024    K 4.4 05/07/2024    CO2 23 05/07/2024     (H) 05/07/2024    BUN 16 05/07/2024    CREATININE 0.73 05/07/2024     Lab Results   Component Value Date    ALT 17 05/07/2024    AST 16 05/07/2024    ALKPHOS 48 05/07/2024    BILITOT 0.4 05/07/2024      Lab Results   Component Value Date    CORTISOL 6.1 10/17/2023    TSH 3.90 05/07/2024    FREET4 1.35 04/16/2024       Imaging:  I have personally reviewed the below imaging and concur with the reported findings unless otherwise stated:         === Results for orders placed in visit on 09/21/23 ===    MR brain w and wo IV contrast [[id number]] 09/21/2023    Status: Normal  No evidence of intracranial metastatic disease was identified at  this  time.    MACRO:  None      Assessment/Plan      Torres Rivera is a 54 y.o. male PMHx PMHx HTN, psoriasis, and tobacco use who presented with NSCLC squamous cell ca, no targetable mutations, neg PD-L1 who is s/p concurrent chemoradiation with weekly carbo/taxol then progression after holding durvalumab due to G2 pneumonitis. Had one cycle of ipi/nivo, but developed pneumonitis. Progressed on docetaxel/ramucirumab and clinical trial at [facility].     #NSCLC squamous cell ca  - T3N3M0, Stage IIIC originally, now with M1a progression  - PD-L1 neg, no targetable mutations  - supraclavicular node biopsy-proven squamous cell ca  - met Dr. Lew and discussed concurrent chemoradiation  - he has concerns about radiation and treatment in general   - discussed if we move forward with curative-intent concurrent chemoradiation, would receive weekly carbo/taxol. discussed palliative treatment with combination chemo-immunotherapy would be the non-radiation option, unfortunately surgery is not a viable  option.  - discussed potential side effects of carbo/taxol including but not limited to allergic reaction, nausea/vomiting, diarrhea/constipation, fatigue, injury to liver/kidney, risk of infection, anemia/thrombocytopenia. consented 2/7/23  - C1D1 concurrent chemoRT with weekly carbo/taxol 2/21/23  - discussed a year of durvalumab afterwards broadly  - 3/28/23 Carbo/Taxol held for thrombocytopenia (66), given IVF in infusion for decreased fluid intake/tachycardia  -last radiation 4/3/23; last Carbo/Taxol 3/21/23  - consented for 1 year durvalumab 4/25/23 and received two doses  - CT scan concerning for pneumonitis in view of worsening SOB, prednisone initiated at 1mg/kg/day (90mg) as well as PPI/Bactrim prophylactically, completed steroid taper with resolution of symptoms.   - personally reviewed PET/CT with FDG-avidity  of LLL lesion and possible abdominal lymph node; will obtain biopsy of LLL lesion  - personally reviewed  brain MRI without evidence of disease  - discussed results of biopsy of LLL nodule, with same histology and biomarkers. PD-L1 10%. Discussed likelihood that this is M1a progression. He remains uninterested in any further radiation and strongly would like to pursue immunotherapy. Discussed potential of recurrent pneumonitis. Discussed clinical trial option, which he is currently also not interested in.  - Full NGS tempus on tissue - unfortunately insufficient tissue from most recent biopsy and original biopsy  - liquid NGS without targetable mutation  - Previously discussed ipi/nivo and risks, particularly risk of recurrent pneumonitis. Consented 10/17/23 and C1D1 10/17/23. Had unexpected restaging scan after C1, with G1 pneumonitis noted on scan. He is asymptomatic.   - discussed next line of therapy with either SoC docetaxel/ramucirumab vs clinical trial. He currently does not have short term disability insurance and is very concerned about being able to continue working while on clinical trial. Consented for docetaxel/ramucirumab 11/28/23  - started docetaxel/ramucirumab 12/12/23  - personally reviewed restaging scans with evidence of partial response  - C4 was complicated by G2 fatigue, and will dose reduce docetaxel 20% for fatigue  -C5 complicated by fatigue, dose reduced docetaxel further (33.3%)  - C7 complicated by worsening fatigue and weakness. Will hold further docetaxel. Recommended phase 1 clinical trial, pending improvement in fatigue, and will contact trials nurses  -Patient was screened for clinical trials, however, no eligible trials at this point in time due to his history of pneumonitis. Discussed the option for obtaining a second opinion at another academic institution to see if he can be screened for other clinical trials. Alternatively, single agent gemcitabine could be offered. Patient was understanding and had the opportunity to ask questions. He will take some time to think about it and let  us know his decision   - he progressed on TORL clinical trial at [facility] and returns for consideration of future trial here. He had [employer] done, and request copy of report - discussed that unless he has new mutation, we do not have current clinical trial here. Recommended he move forward with IL-2 nebulized trial at [facility] and RTC PRN     # Back pain  - will have restaging scan as above, may need spine MRI if concern for spinal lesions  - CT 5/10/24 with degenerative changes in thoracolumbar spine. No metastatic bone lesions      # Subclinical hyperthyroidism  -TSH elevated with normal T4, will monitor closely     # Fatigue  - dose-reduced docetaxel as above  - refer to integrative oncology per patient preference     # BLE edema  - had dopplers and no DVT  - TTE 4/25/24 with EF 60-65%      # G2 pneumonitis-irAE vs radiation pneumonitis- -> recurrent G1 pneumonitis  -asymptomatic, will monitor  -Stopped ipi/nivo     #HTN  - new HTN (likely a side effect of ramicurimab), now improving on lisinopril 20 mg daily     #Anxiety  -related to diagnosis of lung cancer  -Appreciate SW support     #Psoriasis  -Reports previously diagnosed by dermatology and was previously on Otezla when rash was more diffuse  -Currently on topical steroid, reports minimal benefit. Small rash present on the elbow  - offered dermatology referral at last visit, he deferred.

## 2024-09-09 DIAGNOSIS — I10 BENIGN ESSENTIAL HYPERTENSION: ICD-10-CM

## 2024-09-09 RX ORDER — LISINOPRIL 40 MG/1
40 TABLET ORAL DAILY
Qty: 30 TABLET | Refills: 0 | Status: SHIPPED | OUTPATIENT
Start: 2024-09-09

## 2024-09-17 LAB
COMPLETE PATHOLOGY REPORT: NORMAL
CONVERTED ADDENDUM DIAGNOSIS 2: NORMAL
CONVERTED ADDENDUM DIAGNOSIS 3: NORMAL
CONVERTED ADDENDUM DIAGNOSIS 4: NORMAL
CONVERTED CLINICAL DIAGNOSIS-HISTORY: NORMAL
CONVERTED FINAL DIAGNOSIS: NORMAL
CONVERTED FINAL REPORT PDF LINK TO COPY AND PASTE: NORMAL
CONVERTED GROSS DESCRIPTION: NORMAL
CONVERTED REPORT COMMENTS: NORMAL
PATH REPORT.ADDENDUM SPEC: NORMAL
PATH REPORT.TOTAL CANCER: NORMAL

## 2024-09-30 RX ORDER — LANOLIN ALCOHOL/MO/W.PET/CERES
400 CREAM (GRAM) TOPICAL
COMMUNITY
Start: 2024-07-23

## 2024-09-30 RX ORDER — DEXAMETHASONE 4 MG/1
TABLET ORAL
COMMUNITY
Start: 2023-12-12 | End: 2024-10-01 | Stop reason: ALTCHOICE

## 2024-09-30 RX ORDER — OXYCODONE HYDROCHLORIDE 5 MG/1
TABLET ORAL
COMMUNITY
Start: 2024-07-29 | End: 2024-10-01 | Stop reason: ALTCHOICE

## 2024-09-30 RX ORDER — IBUPROFEN 100 MG/5ML
1000 SUSPENSION, ORAL (FINAL DOSE FORM) ORAL
COMMUNITY

## 2024-09-30 RX ORDER — SODIUM CHLORIDE 9 MG/ML
5-30 INJECTION, SOLUTION INTRAVENOUS
COMMUNITY
Start: 2024-07-08

## 2024-09-30 RX ORDER — CYCLOBENZAPRINE HCL 5 MG
TABLET ORAL
COMMUNITY
Start: 2024-07-31 | End: 2024-10-01 | Stop reason: ALTCHOICE

## 2024-10-01 ENCOUNTER — OFFICE VISIT (OUTPATIENT)
Dept: PRIMARY CARE | Facility: CLINIC | Age: 54
End: 2024-10-01
Payer: COMMERCIAL

## 2024-10-01 VITALS
BODY MASS INDEX: 29.19 KG/M2 | WEIGHT: 186 LBS | SYSTOLIC BLOOD PRESSURE: 90 MMHG | HEIGHT: 67 IN | TEMPERATURE: 97.3 F | DIASTOLIC BLOOD PRESSURE: 60 MMHG

## 2024-10-01 DIAGNOSIS — N40.1 BENIGN PROSTATIC HYPERPLASIA WITH NOCTURIA: ICD-10-CM

## 2024-10-01 DIAGNOSIS — E78.00 PURE HYPERCHOLESTEROLEMIA: ICD-10-CM

## 2024-10-01 DIAGNOSIS — R79.89 ELEVATED TSH: ICD-10-CM

## 2024-10-01 DIAGNOSIS — R35.1 BENIGN PROSTATIC HYPERPLASIA WITH NOCTURIA: ICD-10-CM

## 2024-10-01 DIAGNOSIS — F51.01 PRIMARY INSOMNIA: Primary | ICD-10-CM

## 2024-10-01 DIAGNOSIS — E55.9 MILD VITAMIN D DEFICIENCY: ICD-10-CM

## 2024-10-01 DIAGNOSIS — E53.8 VITAMIN B12 DEFICIENCY: ICD-10-CM

## 2024-10-01 DIAGNOSIS — Z12.11 ENCOUNTER FOR SCREENING FOR MALIGNANT NEOPLASM OF COLON: ICD-10-CM

## 2024-10-01 DIAGNOSIS — I10 BENIGN ESSENTIAL HYPERTENSION: ICD-10-CM

## 2024-10-01 PROCEDURE — 1036F TOBACCO NON-USER: CPT | Performed by: PHYSICIAN ASSISTANT

## 2024-10-01 PROCEDURE — 3078F DIAST BP <80 MM HG: CPT | Performed by: PHYSICIAN ASSISTANT

## 2024-10-01 PROCEDURE — 3008F BODY MASS INDEX DOCD: CPT | Performed by: PHYSICIAN ASSISTANT

## 2024-10-01 PROCEDURE — 99214 OFFICE O/P EST MOD 30 MIN: CPT | Performed by: PHYSICIAN ASSISTANT

## 2024-10-01 PROCEDURE — 3074F SYST BP LT 130 MM HG: CPT | Performed by: PHYSICIAN ASSISTANT

## 2024-10-01 RX ORDER — LISINOPRIL 20 MG/1
20 TABLET ORAL DAILY
Qty: 30 TABLET | Refills: 3 | Status: SHIPPED | OUTPATIENT
Start: 2024-10-01 | End: 2025-01-29

## 2024-10-01 RX ORDER — TRAZODONE HYDROCHLORIDE 50 MG/1
50 TABLET ORAL NIGHTLY PRN
Qty: 30 TABLET | Refills: 5 | Status: SHIPPED | OUTPATIENT
Start: 2024-10-01 | End: 2025-10-01

## 2024-10-01 RX ORDER — LISINOPRIL 10 MG/1
10 TABLET ORAL DAILY
Qty: 30 TABLET | Refills: 6 | Status: SHIPPED
Start: 2024-10-01 | End: 2024-10-01 | Stop reason: ENTERED-IN-ERROR

## 2024-10-01 ASSESSMENT — ENCOUNTER SYMPTOMS
FEVER: 0
OCCASIONAL FEELINGS OF UNSTEADINESS: 0
WHEEZING: 0
MYALGIAS: 0
FACIAL SWELLING: 0
DIARRHEA: 0
FREQUENCY: 0
SLEEP DISTURBANCE: 1
ANAL BLEEDING: 0
CONSTIPATION: 0
VOMITING: 0
NUMBNESS: 0
WEAKNESS: 0
SORE THROAT: 0
SHORTNESS OF BREATH: 0
CONFUSION: 0
APPETITE CHANGE: 0
CHOKING: 0
NAUSEA: 0
HEMATURIA: 0
DEPRESSION: 0
PALPITATIONS: 0
ABDOMINAL PAIN: 0
LOSS OF SENSATION IN FEET: 0
NERVOUS/ANXIOUS: 0
EYE DISCHARGE: 0
TREMORS: 0
COUGH: 0
HEADACHES: 0
FATIGUE: 0
DIFFICULTY URINATING: 0
ARTHRALGIAS: 0
COLOR CHANGE: 0
POLYPHAGIA: 0
CHILLS: 0
DIZZINESS: 0
JOINT SWELLING: 0
CHEST TIGHTNESS: 0
POLYDIPSIA: 0
EYE PAIN: 0
ABDOMINAL DISTENTION: 0

## 2024-10-01 ASSESSMENT — COLUMBIA-SUICIDE SEVERITY RATING SCALE - C-SSRS
6. HAVE YOU EVER DONE ANYTHING, STARTED TO DO ANYTHING, OR PREPARED TO DO ANYTHING TO END YOUR LIFE?: NO
2. HAVE YOU ACTUALLY HAD ANY THOUGHTS OF KILLING YOURSELF?: NO
1. IN THE PAST MONTH, HAVE YOU WISHED YOU WERE DEAD OR WISHED YOU COULD GO TO SLEEP AND NOT WAKE UP?: NO

## 2024-10-01 ASSESSMENT — PAIN SCALES - GENERAL: PAINLEVEL: 0-NO PAIN

## 2024-10-01 ASSESSMENT — PATIENT HEALTH QUESTIONNAIRE - PHQ9
SUM OF ALL RESPONSES TO PHQ9 QUESTIONS 1 AND 2: 0
1. LITTLE INTEREST OR PLEASURE IN DOING THINGS: NOT AT ALL
2. FEELING DOWN, DEPRESSED OR HOPELESS: NOT AT ALL

## 2024-10-01 NOTE — PROGRESS NOTES
"Subjective   Patient ID: Torres Rivera is a 54 y.o. male with a history of metastatic squamous cell lung carcinoma, hypertension, dyslipidemia, BPH, psoriasis, history of pneumonitis who presents for Annual Exam.    HPI The patient is presented for follow-up.  Stated that he is currently in clinical trials for his metastatic squamous cell lung carcinoma.  Stated he is staying positive.  Today he is complaining of insomnia.  He had tried valerian root with no improvement.    He self stopped rosuvastatin 3 weeks ago.  His blood pressure has been running low.  He is currently on lisinopril 40 mg daily.  He denies shortness of breath or chest pain.    Review of Systems   Constitutional:  Negative for appetite change, chills, fatigue and fever.   HENT:  Negative for congestion, ear pain, facial swelling, hearing loss, nosebleeds and sore throat.    Eyes:  Negative for pain, discharge and visual disturbance.   Respiratory:  Negative for cough, choking, chest tightness, shortness of breath and wheezing.    Cardiovascular:  Negative for chest pain, palpitations and leg swelling.   Gastrointestinal:  Negative for abdominal distention, abdominal pain, anal bleeding, constipation, diarrhea, nausea and vomiting.   Endocrine: Negative for cold intolerance, heat intolerance, polydipsia, polyphagia and polyuria.   Genitourinary:  Negative for difficulty urinating, frequency, hematuria and urgency.   Musculoskeletal:  Negative for arthralgias, gait problem, joint swelling and myalgias.   Skin:  Negative for color change and rash.   Neurological:  Negative for dizziness, tremors, syncope, weakness, numbness and headaches.   Psychiatric/Behavioral:  Positive for sleep disturbance. Negative for behavioral problems, confusion and suicidal ideas. The patient is not nervous/anxious.        Objective   BP 90/60   Temp 36.3 °C (97.3 °F) (Temporal)   Ht 1.702 m (5' 7\")   Wt 84.4 kg (186 lb)   BMI 29.13 kg/m²     Physical " Exam  Constitutional:       General: He is not in acute distress.     Appearance: Normal appearance.   HENT:      Head: Normocephalic and atraumatic.      Nose: Nose normal.   Eyes:      Extraocular Movements: Extraocular movements intact.      Conjunctiva/sclera: Conjunctivae normal.      Pupils: Pupils are equal, round, and reactive to light.   Cardiovascular:      Rate and Rhythm: Normal rate and regular rhythm.      Pulses: Normal pulses.      Heart sounds: Normal heart sounds.   Pulmonary:      Effort: Pulmonary effort is normal.      Breath sounds: Normal breath sounds.   Abdominal:      General: Bowel sounds are normal.      Palpations: Abdomen is soft.   Musculoskeletal:         General: Normal range of motion.      Cervical back: Normal range of motion and neck supple.   Neurological:      General: No focal deficit present.      Mental Status: He is alert and oriented to person, place, and time.   Psychiatric:         Mood and Affect: Mood normal.         Behavior: Behavior normal.         Thought Content: Thought content normal.         Judgment: Judgment normal.         Assessment/Plan   Insomnia  Not improved with valerian root  Start trazodone 50 mg at bedtime    Hypertension.  Has been running low  Decrease lisinopril 40 mg to 20 mg daily  Failed metoprolol due to erectile dysfunction  No added salt diet, do not add salt to your food  Try to exercise every other day for 30 minutes  Stress test done in [facility] September 2022, normal    metastatic squamous cell lung carcinoma  Failed durvalumab due pneumonitis   Off of prednisone   Currently in clinical trials  Follow-up with oncology      Dyslipidemia  Self stopped rosuvastatin  Continue with the low fat, low cholesterol diet  I recommend Mediterranean diet, which include fish, chicken, vegetables and olive oil  Exercise daily for 30 minutes at least 3 times a week    Elevated TSH  Last TSH normal    Psoriasis  Stable     Body mass index is 29.13  kg/m².  Continue exercising  Continue healthy diet     Health maintenance  Flu vaccine declined  Cologuard 9/27/2021 negative  Colonoscopy requisition is given to the patient    We obtained blood work  I will call with results    Follow-up in 3 months or sooner if needed

## 2024-10-01 NOTE — H&P (VIEW-ONLY)
"Subjective   Patient ID: Torres Rivera is a 54 y.o. male with a history of metastatic squamous cell lung carcinoma, hypertension, dyslipidemia, BPH, psoriasis, history of pneumonitis who presents for Annual Exam.    HPI The patient is presented for follow-up.  Stated that he is currently in clinical trials for his metastatic squamous cell lung carcinoma.  Stated he is staying positive.  Today he is complaining of insomnia.  He had tried valerian root with no improvement.    He self stopped rosuvastatin 3 weeks ago.  His blood pressure has been running low.  He is currently on lisinopril 40 mg daily.  He denies shortness of breath or chest pain.    Review of Systems   Constitutional:  Negative for appetite change, chills, fatigue and fever.   HENT:  Negative for congestion, ear pain, facial swelling, hearing loss, nosebleeds and sore throat.    Eyes:  Negative for pain, discharge and visual disturbance.   Respiratory:  Negative for cough, choking, chest tightness, shortness of breath and wheezing.    Cardiovascular:  Negative for chest pain, palpitations and leg swelling.   Gastrointestinal:  Negative for abdominal distention, abdominal pain, anal bleeding, constipation, diarrhea, nausea and vomiting.   Endocrine: Negative for cold intolerance, heat intolerance, polydipsia, polyphagia and polyuria.   Genitourinary:  Negative for difficulty urinating, frequency, hematuria and urgency.   Musculoskeletal:  Negative for arthralgias, gait problem, joint swelling and myalgias.   Skin:  Negative for color change and rash.   Neurological:  Negative for dizziness, tremors, syncope, weakness, numbness and headaches.   Psychiatric/Behavioral:  Positive for sleep disturbance. Negative for behavioral problems, confusion and suicidal ideas. The patient is not nervous/anxious.        Objective   BP 90/60   Temp 36.3 °C (97.3 °F) (Temporal)   Ht 1.702 m (5' 7\")   Wt 84.4 kg (186 lb)   BMI 29.13 kg/m²     Physical " Exam  Constitutional:       General: He is not in acute distress.     Appearance: Normal appearance.   HENT:      Head: Normocephalic and atraumatic.      Nose: Nose normal.   Eyes:      Extraocular Movements: Extraocular movements intact.      Conjunctiva/sclera: Conjunctivae normal.      Pupils: Pupils are equal, round, and reactive to light.   Cardiovascular:      Rate and Rhythm: Normal rate and regular rhythm.      Pulses: Normal pulses.      Heart sounds: Normal heart sounds.   Pulmonary:      Effort: Pulmonary effort is normal.      Breath sounds: Normal breath sounds.   Abdominal:      General: Bowel sounds are normal.      Palpations: Abdomen is soft.   Musculoskeletal:         General: Normal range of motion.      Cervical back: Normal range of motion and neck supple.   Neurological:      General: No focal deficit present.      Mental Status: He is alert and oriented to person, place, and time.   Psychiatric:         Mood and Affect: Mood normal.         Behavior: Behavior normal.         Thought Content: Thought content normal.         Judgment: Judgment normal.         Assessment/Plan   Insomnia  Not improved with valerian root  Start trazodone 50 mg at bedtime    Hypertension.  Has been running low  Decrease lisinopril 40 mg to 20 mg daily  Failed metoprolol due to erectile dysfunction  No added salt diet, do not add salt to your food  Try to exercise every other day for 30 minutes  Stress test done in [facility] September 2022, normal    metastatic squamous cell lung carcinoma  Failed durvalumab due pneumonitis   Off of prednisone   Currently in clinical trials  Follow-up with oncology      Dyslipidemia  Self stopped rosuvastatin  Continue with the low fat, low cholesterol diet  I recommend Mediterranean diet, which include fish, chicken, vegetables and olive oil  Exercise daily for 30 minutes at least 3 times a week    Elevated TSH  Last TSH normal    Psoriasis  Stable     Body mass index is 29.13  kg/m².  Continue exercising  Continue healthy diet     Health maintenance  Flu vaccine declined  Cologuard 9/27/2021 negative  Colonoscopy requisition is given to the patient    We obtained blood work  I will call with results    Follow-up in 3 months or sooner if needed

## 2024-10-03 DIAGNOSIS — Z12.11 COLON CANCER SCREENING: ICD-10-CM

## 2024-10-03 PROBLEM — C77.1: Status: ACTIVE | Noted: 2023-07-11

## 2024-10-03 PROBLEM — C34.90: Status: ACTIVE | Noted: 2023-07-11

## 2024-10-03 RX ORDER — SODIUM, POTASSIUM,MAG SULFATES 17.5-3.13G
SOLUTION, RECONSTITUTED, ORAL ORAL
Qty: 1 EACH | Refills: 0 | Status: SHIPPED | OUTPATIENT
Start: 2024-10-03

## 2024-10-07 ENCOUNTER — LAB (OUTPATIENT)
Dept: LAB | Facility: LAB | Age: 54
End: 2024-10-07
Payer: COMMERCIAL

## 2024-10-07 DIAGNOSIS — E53.8 VITAMIN B12 DEFICIENCY: ICD-10-CM

## 2024-10-07 DIAGNOSIS — E78.00 PURE HYPERCHOLESTEROLEMIA: ICD-10-CM

## 2024-10-07 DIAGNOSIS — N40.1 BENIGN PROSTATIC HYPERPLASIA WITH NOCTURIA: ICD-10-CM

## 2024-10-07 DIAGNOSIS — R79.89 ELEVATED TSH: ICD-10-CM

## 2024-10-07 DIAGNOSIS — R35.1 BENIGN PROSTATIC HYPERPLASIA WITH NOCTURIA: ICD-10-CM

## 2024-10-07 DIAGNOSIS — I10 BENIGN ESSENTIAL HYPERTENSION: ICD-10-CM

## 2024-10-07 DIAGNOSIS — E55.9 MILD VITAMIN D DEFICIENCY: ICD-10-CM

## 2024-10-07 LAB
25(OH)D3 SERPL-MCNC: 59 NG/ML (ref 30–100)
CHOLEST SERPL-MCNC: 234 MG/DL (ref 0–199)
CHOLESTEROL/HDL RATIO: 4.6
HDLC SERPL-MCNC: 51 MG/DL
LDLC SERPL CALC-MCNC: 167 MG/DL
NON HDL CHOLESTEROL: 183 MG/DL (ref 0–149)
PSA SERPL-MCNC: 0.43 NG/ML
TRIGL SERPL-MCNC: 80 MG/DL (ref 0–149)
TSH SERPL-ACNC: 1.84 MIU/L (ref 0.44–3.98)
VIT B12 SERPL-MCNC: 324 PG/ML (ref 211–911)
VLDL: 16 MG/DL (ref 0–40)

## 2024-10-07 PROCEDURE — 84153 ASSAY OF PSA TOTAL: CPT

## 2024-10-07 PROCEDURE — 84443 ASSAY THYROID STIM HORMONE: CPT

## 2024-10-07 PROCEDURE — 82607 VITAMIN B-12: CPT

## 2024-10-07 PROCEDURE — 80061 LIPID PANEL: CPT

## 2024-10-07 PROCEDURE — 36415 COLL VENOUS BLD VENIPUNCTURE: CPT

## 2024-10-07 PROCEDURE — 82306 VITAMIN D 25 HYDROXY: CPT

## 2024-10-07 RX ORDER — ROSUVASTATIN CALCIUM 10 MG/1
10 TABLET, COATED ORAL DAILY
Qty: 90 TABLET | Refills: 3 | Status: SHIPPED | OUTPATIENT
Start: 2024-10-07 | End: 2025-10-02

## 2024-10-15 ENCOUNTER — ANESTHESIA EVENT (OUTPATIENT)
Dept: GASTROENTEROLOGY | Facility: EXTERNAL LOCATION | Age: 54
End: 2024-10-15

## 2024-10-25 ENCOUNTER — APPOINTMENT (OUTPATIENT)
Dept: GASTROENTEROLOGY | Facility: EXTERNAL LOCATION | Age: 54
End: 2024-10-25
Payer: COMMERCIAL

## 2024-10-25 ENCOUNTER — ANESTHESIA (OUTPATIENT)
Dept: GASTROENTEROLOGY | Facility: EXTERNAL LOCATION | Age: 54
End: 2024-10-25

## 2024-10-25 VITALS
RESPIRATION RATE: 15 BRPM | HEART RATE: 79 BPM | OXYGEN SATURATION: 99 % | TEMPERATURE: 97.3 F | DIASTOLIC BLOOD PRESSURE: 79 MMHG | SYSTOLIC BLOOD PRESSURE: 109 MMHG | WEIGHT: 186 LBS | BODY MASS INDEX: 29.19 KG/M2 | HEIGHT: 67 IN

## 2024-10-25 DIAGNOSIS — Z12.11 ENCOUNTER FOR SCREENING FOR MALIGNANT NEOPLASM OF COLON: Primary | ICD-10-CM

## 2024-10-25 PROCEDURE — 0753T DGTZ GLS MCRSCP SLD LEVEL IV: CPT | Mod: TC,ELYLAB | Performed by: INTERNAL MEDICINE

## 2024-10-25 PROCEDURE — 45380 COLONOSCOPY AND BIOPSY: CPT | Performed by: INTERNAL MEDICINE

## 2024-10-25 RX ORDER — SODIUM CHLORIDE 9 MG/ML
INJECTION, SOLUTION INTRAVENOUS CONTINUOUS PRN
Status: DISCONTINUED | OUTPATIENT
Start: 2024-10-25 | End: 2024-10-25

## 2024-10-25 RX ORDER — PROPOFOL 10 MG/ML
INJECTION, EMULSION INTRAVENOUS AS NEEDED
Status: DISCONTINUED | OUTPATIENT
Start: 2024-10-25 | End: 2024-10-25

## 2024-10-25 RX ORDER — LIDOCAINE HYDROCHLORIDE 20 MG/ML
INJECTION, SOLUTION EPIDURAL; INFILTRATION; INTRACAUDAL; PERINEURAL AS NEEDED
Status: DISCONTINUED | OUTPATIENT
Start: 2024-10-25 | End: 2024-10-25

## 2024-10-25 SDOH — HEALTH STABILITY: MENTAL HEALTH: CURRENT SMOKER: 0

## 2024-10-25 ASSESSMENT — PAIN SCALES - GENERAL
PAINLEVEL_OUTOF10: 0 - NO PAIN
PAIN_LEVEL: 0

## 2024-10-25 ASSESSMENT — PAIN - FUNCTIONAL ASSESSMENT
PAIN_FUNCTIONAL_ASSESSMENT: 0-10

## 2024-10-25 NOTE — ANESTHESIA PREPROCEDURE EVALUATION
Patient: Torres Rivera    Procedure Information       Date/Time: 10/25/24 1350    Scheduled providers: Renato Burnette MD    Procedure: COLONOSCOPY    Location: [facility]            Relevant Problems   Anesthesia (within normal limits)      Cardiac   (+) Benign essential hypertension   (+) Pure hypercholesterolemia      Pulmonary   (+) Cough variant asthma (HHS-HCC)   (+) NSCLC metastatic to intrathoracic lymph node (Multi)   (+) Primary squamous cell carcinoma of lower lobe of right lung (Multi)   (+) Shortness of breath on exertion      Neuro (within normal limits)      GI (within normal limits)      /Renal   (+) BPH (benign prostatic hyperplasia)      Liver (within normal limits)      Endocrine (within normal limits)      Hematology (within normal limits)      Musculoskeletal (within normal limits)      HEENT (within normal limits)      ID (within normal limits)      Skin   (+) Eczema of right external ear   (+) Guttate psoriasis      GYN (within normal limits)       Clinical information reviewed:   Tobacco  Allergies  Meds   Med Hx  Surg Hx   Fam Hx  Soc Hx        NPO Detail:  NPO/Void Status  Date of Last Liquid: 10/25/24  Time of Last Liquid: 0900  Date of Last Solid: 10/24/24  Time of Last Solid: 0800         Physical Exam    Airway  Mallampati: II  TM distance: >3 FB  Neck ROM: full     Cardiovascular - normal exam  Rhythm: regular  Rate: normal     Dental - normal exam     Pulmonary - normal exam  Breath sounds clear to auscultation     Abdominal            Anesthesia Plan    History of general anesthesia?: yes  History of complications of general anesthesia?: no    ASA 2     MAC     The patient is not a current smoker.    intravenous induction   Anesthetic plan and risks discussed with patient.    Plan discussed with CRNA.

## 2024-10-25 NOTE — DISCHARGE INSTRUCTIONS

## 2024-10-25 NOTE — ANESTHESIA POSTPROCEDURE EVALUATION
Patient: Torres Rivera    Procedure Summary       Date: 10/25/24 Room / Location: [facility]    Anesthesia Start: 1335 Anesthesia Stop: 1358    Procedure: COLONOSCOPY Diagnosis:       Encounter for screening for malignant neoplasm of colon      Encounter for screening for malignant neoplasm of colon    Scheduled Providers: Renato Burnette MD Responsible Provider: JANNA Dominguez    Anesthesia Type: MAC ASA Status: 2            Anesthesia Type: MAC    Vitals Value Taken Time   /68 10/25/24 1355   Temp 36.3 °C (97.3 °F) 10/25/24 1355   Pulse 102 10/25/24 1355   Resp 15 10/25/24 1355   SpO2 96 % 10/25/24 1355       Anesthesia Post Evaluation    Patient location during evaluation: bedside  Patient participation: complete - patient participated  Level of consciousness: awake  Pain score: 0  Pain management: adequate  Airway patency: patent  Cardiovascular status: acceptable  Respiratory status: acceptable  Hydration status: acceptable  Postoperative Nausea and Vomiting: none        There were no known notable events for this encounter.

## 2024-10-25 NOTE — SIGNIFICANT EVENT
Physician at bedside to discuss procedure results with patient  Discharge Instructions reviewed with patient. Verbalizes an understanding of post procedure and anesthesia instructions. Verbalizes an understanding of signs/symptoms to watch for at home.

## 2024-11-04 LAB
LABORATORY COMMENT REPORT: NORMAL
PATH REPORT.COMMENTS IMP SPEC: NORMAL
PATH REPORT.FINAL DX SPEC: NORMAL
PATH REPORT.GROSS SPEC: NORMAL
PATH REPORT.RELEVANT HX SPEC: NORMAL
PATH REPORT.TOTAL CANCER: NORMAL

## 2024-11-04 NOTE — RESULT ENCOUNTER NOTE
During recent colonoscopy a small area with abnormality was seen in the ascending colon, pathology results show presence of inflammation.   Due to the localized nature of this abnormality, no further intervention is needed at this time. However if you start experiencing GI symptoms ( diarrhea or bleeding) then please follow up in my office.   Sincerely,   Renato Burnette MD

## 2024-11-06 ENCOUNTER — TELEPHONE (OUTPATIENT)
Dept: PRIMARY CARE | Facility: CLINIC | Age: 54
End: 2024-11-06
Payer: COMMERCIAL

## 2025-01-24 DIAGNOSIS — E78.00 PURE HYPERCHOLESTEROLEMIA: Primary | ICD-10-CM

## 2025-01-24 RX ORDER — EZETIMIBE 10 MG/1
10 TABLET ORAL DAILY
Qty: 90 TABLET | Refills: 3 | Status: SHIPPED | OUTPATIENT
Start: 2025-01-24 | End: 2026-01-24

## 2025-03-20 ENCOUNTER — TELEPHONE (OUTPATIENT)
Dept: PRIMARY CARE | Facility: CLINIC | Age: 55
End: 2025-03-20
Payer: COMMERCIAL